# Patient Record
Sex: MALE | Race: WHITE | NOT HISPANIC OR LATINO | Employment: OTHER | URBAN - METROPOLITAN AREA
[De-identification: names, ages, dates, MRNs, and addresses within clinical notes are randomized per-mention and may not be internally consistent; named-entity substitution may affect disease eponyms.]

---

## 2022-06-17 ENCOUNTER — HOSPITAL ENCOUNTER (EMERGENCY)
Facility: HOSPITAL | Age: 87
Discharge: HOME | End: 2022-06-17
Attending: EMERGENCY MEDICINE | Admitting: EMERGENCY MEDICINE
Payer: MEDICARE

## 2022-06-17 ENCOUNTER — APPOINTMENT (OUTPATIENT)
Dept: RADIOLOGY | Facility: HOSPITAL | Age: 87
End: 2022-06-17
Attending: STUDENT IN AN ORGANIZED HEALTH CARE EDUCATION/TRAINING PROGRAM
Payer: MEDICARE

## 2022-06-17 VITALS
BODY MASS INDEX: 29.03 KG/M2 | HEART RATE: 64 BPM | SYSTOLIC BLOOD PRESSURE: 188 MMHG | OXYGEN SATURATION: 96 % | DIASTOLIC BLOOD PRESSURE: 81 MMHG | TEMPERATURE: 98.4 F | HEIGHT: 67 IN | RESPIRATION RATE: 18 BRPM | WEIGHT: 185 LBS

## 2022-06-17 DIAGNOSIS — R42 EPISODIC LIGHTHEADEDNESS: ICD-10-CM

## 2022-06-17 DIAGNOSIS — M62.81 GENERALIZED MUSCLE WEAKNESS: ICD-10-CM

## 2022-06-17 LAB
ALBUMIN UR-MCNC: NEGATIVE MG/DL
ANION GAP SERPL CALCULATED.3IONS-SCNC: 8 MMOL/L (ref 5–18)
APPEARANCE UR: CLEAR
BASOPHILS # BLD AUTO: 0.1 10E3/UL (ref 0–0.2)
BASOPHILS NFR BLD AUTO: 1 %
BILIRUB UR QL STRIP: NEGATIVE
BUN SERPL-MCNC: 14 MG/DL (ref 8–28)
CALCIUM SERPL-MCNC: 9.4 MG/DL (ref 8.5–10.5)
CHLORIDE BLD-SCNC: 107 MMOL/L (ref 98–107)
CO2 SERPL-SCNC: 26 MMOL/L (ref 22–31)
COLOR UR AUTO: COLORLESS
CREAT SERPL-MCNC: 0.88 MG/DL (ref 0.7–1.3)
EOSINOPHIL # BLD AUTO: 0.3 10E3/UL (ref 0–0.7)
EOSINOPHIL NFR BLD AUTO: 3 %
ERYTHROCYTE [DISTWIDTH] IN BLOOD BY AUTOMATED COUNT: 12.3 % (ref 10–15)
GFR SERPL CREATININE-BSD FRML MDRD: 84 ML/MIN/1.73M2
GLUCOSE BLD-MCNC: 96 MG/DL (ref 70–125)
GLUCOSE UR STRIP-MCNC: NEGATIVE MG/DL
HCT VFR BLD AUTO: 43.2 % (ref 40–53)
HGB BLD-MCNC: 14.1 G/DL (ref 13.3–17.7)
HGB UR QL STRIP: NEGATIVE
IMM GRANULOCYTES # BLD: 0.1 10E3/UL
IMM GRANULOCYTES NFR BLD: 1 %
KETONES UR STRIP-MCNC: NEGATIVE MG/DL
LACTATE SERPL-SCNC: 1.1 MMOL/L (ref 0.7–2)
LEUKOCYTE ESTERASE UR QL STRIP: NEGATIVE
LYMPHOCYTES # BLD AUTO: 1.5 10E3/UL (ref 0.8–5.3)
LYMPHOCYTES NFR BLD AUTO: 16 %
MCH RBC QN AUTO: 31.9 PG (ref 26.5–33)
MCHC RBC AUTO-ENTMCNC: 32.6 G/DL (ref 31.5–36.5)
MCV RBC AUTO: 98 FL (ref 78–100)
MONOCYTES # BLD AUTO: 0.7 10E3/UL (ref 0–1.3)
MONOCYTES NFR BLD AUTO: 8 %
NEUTROPHILS # BLD AUTO: 6.7 10E3/UL (ref 1.6–8.3)
NEUTROPHILS NFR BLD AUTO: 71 %
NITRATE UR QL: NEGATIVE
NRBC # BLD AUTO: 0 10E3/UL
NRBC BLD AUTO-RTO: 0 /100
PH UR STRIP: 5.5 [PH] (ref 5–7)
PLATELET # BLD AUTO: 335 10E3/UL (ref 150–450)
POTASSIUM BLD-SCNC: 4.3 MMOL/L (ref 3.5–5)
RBC # BLD AUTO: 4.42 10E6/UL (ref 4.4–5.9)
RBC URINE: 0 /HPF
SODIUM SERPL-SCNC: 141 MMOL/L (ref 136–145)
SP GR UR STRIP: 1.01 (ref 1–1.03)
TROPONIN I SERPL-MCNC: 0.01 NG/ML (ref 0–0.29)
UROBILINOGEN UR STRIP-MCNC: <2 MG/DL
WBC # BLD AUTO: 9.2 10E3/UL (ref 4–11)
WBC URINE: 1 /HPF

## 2022-06-17 PROCEDURE — 85025 COMPLETE CBC W/AUTO DIFF WBC: CPT | Performed by: STUDENT IN AN ORGANIZED HEALTH CARE EDUCATION/TRAINING PROGRAM

## 2022-06-17 PROCEDURE — 71046 X-RAY EXAM CHEST 2 VIEWS: CPT

## 2022-06-17 PROCEDURE — 96361 HYDRATE IV INFUSION ADD-ON: CPT

## 2022-06-17 PROCEDURE — 258N000003 HC RX IP 258 OP 636: Performed by: STUDENT IN AN ORGANIZED HEALTH CARE EDUCATION/TRAINING PROGRAM

## 2022-06-17 PROCEDURE — 82310 ASSAY OF CALCIUM: CPT | Performed by: STUDENT IN AN ORGANIZED HEALTH CARE EDUCATION/TRAINING PROGRAM

## 2022-06-17 PROCEDURE — 99285 EMERGENCY DEPT VISIT HI MDM: CPT | Mod: 25

## 2022-06-17 PROCEDURE — 93005 ELECTROCARDIOGRAM TRACING: CPT | Performed by: STUDENT IN AN ORGANIZED HEALTH CARE EDUCATION/TRAINING PROGRAM

## 2022-06-17 PROCEDURE — 84484 ASSAY OF TROPONIN QUANT: CPT | Performed by: STUDENT IN AN ORGANIZED HEALTH CARE EDUCATION/TRAINING PROGRAM

## 2022-06-17 PROCEDURE — 83605 ASSAY OF LACTIC ACID: CPT | Performed by: STUDENT IN AN ORGANIZED HEALTH CARE EDUCATION/TRAINING PROGRAM

## 2022-06-17 PROCEDURE — 96360 HYDRATION IV INFUSION INIT: CPT

## 2022-06-17 PROCEDURE — 82374 ASSAY BLOOD CARBON DIOXIDE: CPT | Performed by: STUDENT IN AN ORGANIZED HEALTH CARE EDUCATION/TRAINING PROGRAM

## 2022-06-17 PROCEDURE — 81001 URINALYSIS AUTO W/SCOPE: CPT | Performed by: STUDENT IN AN ORGANIZED HEALTH CARE EDUCATION/TRAINING PROGRAM

## 2022-06-17 PROCEDURE — 36415 COLL VENOUS BLD VENIPUNCTURE: CPT | Performed by: STUDENT IN AN ORGANIZED HEALTH CARE EDUCATION/TRAINING PROGRAM

## 2022-06-17 RX ADMIN — SODIUM CHLORIDE 1000 ML: 9 INJECTION, SOLUTION INTRAVENOUS at 13:35

## 2022-06-17 ASSESSMENT — ENCOUNTER SYMPTOMS
TREMORS: 0
SORE THROAT: 0
FEVER: 0
JOINT SWELLING: 0
CONFUSION: 0
DYSURIA: 0
CHILLS: 0
VOMITING: 0
DIARRHEA: 0
HEADACHES: 0
WEAKNESS: 1
SHORTNESS OF BREATH: 0
NAUSEA: 0
DIZZINESS: 0
HEMATURIA: 0
ABDOMINAL PAIN: 0

## 2022-06-17 NOTE — DISCHARGE INSTRUCTIONS
Labs and evaluation today, reassuring, improved after IvF.  Please follow up with primary clinic. Return for new emergency concerns.

## 2022-06-17 NOTE — ED PROVIDER NOTES
"  Emergency Department Encounter     Evaluation Date & Time:   6/17/2022  3:13 PM    CHIEF COMPLAINT:  No chief complaint on file.      Triage Note:  Patient brought to ED by his niece for evaluation of bilateral leg weakness and dizziness. Started aprx 2 hours ago.                ED COURSE & MEDICAL DECISION MAKING:     ED Course as of 06/17/22 1747   Fri Jun 17, 2022   1528 Labs reassuring.  Awaiting UA.  CXR neg for acute pathology.   1554 Pt able to ambulate, appears to be at baseline per niece.  Will eat something here, sending UA now.  Anticipate discharge back to home.   1631 UA neg for infection.  Will discharge home.     Pt presenting with generalized weakness and lightheadedness that started shortly pta after getting \"aggressive\" PT at his senior living apartment.  Pt's niece concerned he didn't eat much today and likely is a little dehydrated.  He has no focal complaints or findings on exam to suggest stroke/intracranial process.  Vitals overall reassuring.  Labs from triage unremarkable.  Awaiting UA and will road test pt.  If stable, anticipate discharge back home.  Pt received IVF here.    3:29 PM I met with the patient for the initial interview and physical examination. Discussed plan for treatment and workup in the ED. PPE Worn: N95 and gloves   4:32 PM I rechecked and updated the patient. We discussed the plan for discharge and the patient is agreeable. Reviewed supportive cares, symptomatic treatment, outpatient follow up, and reasons to return to the Emergency Department. All questions and concerns were addressed. Patient to be discharged by ED RN.      At the conclusion of the encounter I discussed the results of all the tests and the disposition. The questions were answered. The patient or family acknowledged understanding and was agreeable with the care plan.      MEDICATIONS GIVEN IN THE EMERGENCY DEPARTMENT:  Medications   0.9% sodium chloride BOLUS (0 mLs Intravenous Stopped 6/17/22 4505) "       NEW PRESCRIPTIONS STARTED AT TODAY'S ED VISIT:  Discharge Medication List as of 6/17/2022  4:38 PM          HPI   HPI     George R Milligan is a 86 year old male with a pertinent history of coronary artery bypass graft, pre-diabetes, and basal cell carcinoma who presents to this ED by private vehicle accompanied by niece for evaluation of bilateral leg weakness.    Per patient's niece, patient was in physical therapy early this morning where he underwent a intense back massage. She notes that patient currently lives independently in an apartment complex that has assisted living which the patient does not currently require. Patient had not eaten or drank anything yet at that point and when walking to the cafeteria, patient was visibly dizzy and almost fell. Patient had been walking normally and progressively declined in bilateral lower extremity strength to the point where he was unable to stand.    Patient reports that he has no strength from the hips down following the back massage during physical therapy this morning. He notes that is currently feels okay but is unsure if he has the ability to stand up. Patient denies abdominal pain, chest pain, nausea, vomiting, diarrhea, cough, fever, headache, tremors, or additional symptoms or complaints at this time.     REVIEW OF SYSTEMS:  Review of Systems   Constitutional: Negative for chills and fever.   HENT: Negative for sore throat.    Eyes: Negative for visual disturbance.   Respiratory: Negative for shortness of breath.    Cardiovascular: Negative for chest pain.   Gastrointestinal: Negative for abdominal pain, diarrhea, nausea and vomiting.   Endocrine: Negative for polyuria.   Genitourinary: Negative for dysuria and hematuria.        - urinary changes   Musculoskeletal: Negative for joint swelling.   Skin: Negative for rash.   Neurological: Positive for weakness (bilateral lower extremities). Negative for dizziness, tremors and headaches.  "  Psychiatric/Behavioral: Negative for confusion.   All other systems reviewed and are negative.        Medical History   History reviewed. No pertinent past medical history.    History reviewed. No pertinent surgical history.    History reviewed. No pertinent family history.         aspirin 81 mg chewable tablet  cholecalciferol, vitamin D3, 1,000 unit tablet  clopidogrel (PLAVIX) 75 mg tablet  lisinopril (PRINIVIL,ZESTRIL) 20 MG tablet  metoprolol succinate (TOPROL-XL) 50 MG 24 hr tablet  rosuvastatin (CRESTOR) 5 MG tablet        Physical Exam     Vitals:  BP (!) 188/81   Pulse 64   Temp 98.4  F (36.9  C) (Temporal)   Resp 18   Ht 1.702 m (5' 7\")   Wt 83.9 kg (185 lb)   SpO2 96%   BMI 28.98 kg/m      PHYSICAL EXAM:   Physical Exam  Vitals and nursing note reviewed.   Constitutional:       General: He is not in acute distress.     Appearance: Normal appearance.   HENT:      Head: Normocephalic and atraumatic.      Mouth/Throat:      Mouth: Mucous membranes are moist.   Eyes:      Extraocular Movements: Extraocular movements intact.      Pupils: Pupils are equal, round, and reactive to light.      Comments: No vertical or bidirectional nystagmus   Cardiovascular:      Rate and Rhythm: Normal rate and regular rhythm.   Pulmonary:      Effort: Pulmonary effort is normal. No respiratory distress.      Breath sounds: Normal breath sounds.   Abdominal:      General: There is no distension.      Palpations: Abdomen is soft.      Tenderness: There is no abdominal tenderness.   Musculoskeletal:         General: Normal range of motion.      Cervical back: Normal range of motion.   Skin:     General: Skin is warm and dry.      Capillary Refill: Capillary refill takes less than 2 seconds.   Neurological:      Mental Status: He is alert.      Cranial Nerves: Cranial nerves are intact.      Sensory: Sensation is intact.      Motor: Tremor (Bilateral intention upper extremities) present.      Comments: Fluent speech, no " facial asymmetry or pronator drift, 5/5 strength b/l UE/LE, normal finger to nose b/l.         Results     LAB:  All pertinent labs reviewed and interpreted  Labs Ordered and Resulted from Time of ED Arrival to Time of ED Departure   ROUTINE UA WITH MICROSCOPIC REFLEX TO CULTURE - Normal       Result Value    Color Urine Colorless      Appearance Urine Clear      Glucose Urine Negative      Bilirubin Urine Negative      Ketones Urine Negative      Specific Gravity Urine 1.007      Blood Urine Negative      pH Urine 5.5      Protein Albumin Urine Negative      Urobilinogen Urine <2.0      Nitrite Urine Negative      Leukocyte Esterase Urine Negative      RBC Urine 0      WBC Urine 1     BASIC METABOLIC PANEL - Normal    Sodium 141      Potassium 4.3      Chloride 107      Carbon Dioxide (CO2) 26      Anion Gap 8      Urea Nitrogen 14      Creatinine 0.88      Calcium 9.4      Glucose 96      GFR Estimate 84     TROPONIN I - Normal    Troponin I 0.01     LACTIC ACID WHOLE BLOOD - Normal    Lactic Acid 1.1     CBC WITH PLATELETS AND DIFFERENTIAL    WBC Count 9.2      RBC Count 4.42      Hemoglobin 14.1      Hematocrit 43.2      MCV 98      MCH 31.9      MCHC 32.6      RDW 12.3      Platelet Count 335      % Neutrophils 71      % Lymphocytes 16      % Monocytes 8      % Eosinophils 3      % Basophils 1      % Immature Granulocytes 1      NRBCs per 100 WBC 0      Absolute Neutrophils 6.7      Absolute Lymphocytes 1.5      Absolute Monocytes 0.7      Absolute Eosinophils 0.3      Absolute Basophils 0.1      Absolute Immature Granulocytes 0.1      Absolute NRBCs 0.0         RADIOLOGY:  Chest XR,  PA & LAT   Final Result   IMPRESSION: There is increased interstitial markings at the bases suggesting mild interstitial lung disease. No focal consolidation, pneumothorax or pleural effusion. Normal heart size and pulmonary vascularity. Postoperative change from median    sternotomy and coronary bypass grafting.                    ECG:  none    PROCEDURES:  Procedures:  none      FINAL IMPRESSION:    ICD-10-CM    1. Generalized muscle weakness  M62.81    2. Episodic lightheadedness  R42        0 minutes of critical care time      I, Glenn Connors, am serving as a scribe to document services personally performed by Dr. Arash Lee, based on my observations and the provider's statements to me. I, Arash Lee, DO attest that Glenn Connors is acting in a scribe capacity, has observed my performance of the services and has documented them in accordance with my direction.      Arash Lee DO  Emergency Medicine  [facility] EMERGENCY DEPARTMENT  6/17/2022  3:56 PM        Arash Lee MD  06/17/22 4901

## 2022-06-17 NOTE — ED NOTES
He ambulated with a walker and seemed to do well. Niece who witnessed confirmed he was doing well with the walker. He uses a walker at times at home in independent living.

## 2022-06-17 NOTE — ED NOTES
"ED Provider In Triage Note  [facility]  Encounter Date: Jun 17, 2022    No chief complaint on file.      Brief HPI:   George R Milligan is a 86 year old male presenting to the Emergency Department with a chief complaint of generalized weakness.    Noticed generalized weakness about 2 hours PTA  No falls, trauma or other injuries  Globally weak on exam, but non-focal, face symetrical  Niece present with him, concerned about possible dehydration  No fevers or cough reported  Denies chest pain, headache or SOB    Lives at an independent living facility, did his normal PT this morning.      Brief Physical Exam:  BP (!) 156/79   Pulse 72   Temp 98.4  F (36.9  C) (Temporal)   Resp 18   Ht 1.702 m (5' 7\")   Wt 83.9 kg (185 lb)   SpO2 98%   BMI 28.98 kg/m    General: Non-toxic appearing  HEENT: Atraumatic  Resp: No respiratory distress  Abdomen: Non-peritoneal  Neuro: Alert, oriented, answers questions appropriately. Grossly normal strength in all 4 extremities. Globally weak, but weakness is non-focal. No facial droop, EOMI.   Psych: Behavior appropriate    Plan Initiated in Triage:  Orders Placed This Encounter     Chest XR,  PA & LAT     UA with Microscopic reflex to Culture     Basic metabolic panel     Troponin I (now)     Lactic acid whole blood     0.9% sodium chloride BOLUS       PIT Dispo:   Return to lobby while awaiting workup and ED bed availability    Marvin Serrato MD on 6/17/2022 at 12:34 PM    Patient was evaluated by the Physician in Triage due to a limitation of available rooms in the Emergency Department. A plan of care was discussed based on the information obtained on the initial evaluation and patient was consuled to return back to the Emergency Department lobby after this initial evalutaiton until results were obtained or a room became available in the Emergency Department. Patient was counseled not to leave prior to receiving the results of their workup.     Marvin" MD Ama  [facility] EMERGENCY DEPARTMENT  [facility] [facility] [id number]  [phone]     Marvin Serrato MD  06/17/22 2836

## 2022-06-17 NOTE — ED TRIAGE NOTES
Patient brought to ED by his niece for evaluation of bilateral leg weakness and dizziness. Started aprx 2 hours ago.      Triage Assessment     Row Name 06/17/22 1233       Triage Assessment (Adult)    Airway WDL WDL       Skin Circulation/Temperature WDL    Skin Circulation/Temperature WDL WDL       Cardiac WDL    Cardiac WDL WDL       Peripheral/Neurovascular WDL    Peripheral Neurovascular WDL WDL       Cognitive/Neuro/Behavioral WDL    Cognitive/Neuro/Behavioral WDL WDL

## 2022-06-19 LAB
ATRIAL RATE - MUSE: 71 BPM
DIASTOLIC BLOOD PRESSURE - MUSE: NORMAL MMHG
INTERPRETATION ECG - MUSE: NORMAL
P AXIS - MUSE: 56 DEGREES
PR INTERVAL - MUSE: 216 MS
QRS DURATION - MUSE: 86 MS
QT - MUSE: 412 MS
QTC - MUSE: 447 MS
R AXIS - MUSE: 0 DEGREES
SYSTOLIC BLOOD PRESSURE - MUSE: NORMAL MMHG
T AXIS - MUSE: 37 DEGREES
VENTRICULAR RATE- MUSE: 71 BPM

## 2022-08-12 ENCOUNTER — APPOINTMENT (OUTPATIENT)
Dept: RADIOLOGY | Facility: HOSPITAL | Age: 87
DRG: 177 | End: 2022-08-12
Attending: EMERGENCY MEDICINE
Payer: MEDICARE

## 2022-08-12 ENCOUNTER — APPOINTMENT (OUTPATIENT)
Dept: CT IMAGING | Facility: HOSPITAL | Age: 87
DRG: 177 | End: 2022-08-12
Attending: EMERGENCY MEDICINE
Payer: MEDICARE

## 2022-08-12 ENCOUNTER — HOSPITAL ENCOUNTER (INPATIENT)
Facility: HOSPITAL | Age: 87
LOS: 5 days | Discharge: NURSING, REHAB OR LONG-TERM CARE | DRG: 177 | End: 2022-08-23
Attending: EMERGENCY MEDICINE | Admitting: INTERNAL MEDICINE
Payer: MEDICARE

## 2022-08-12 ENCOUNTER — APPOINTMENT (OUTPATIENT)
Dept: CARDIOLOGY | Facility: HOSPITAL | Age: 87
DRG: 177 | End: 2022-08-12
Attending: INTERNAL MEDICINE
Payer: MEDICARE

## 2022-08-12 DIAGNOSIS — I10 ESSENTIAL HYPERTENSION: ICD-10-CM
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
## 2022-08-12 DIAGNOSIS — S31.20XD: ICD-10-CM

## 2022-08-12 DIAGNOSIS — E78.00 PURE HYPERCHOLESTEROLEMIA: Primary | ICD-10-CM

## 2022-08-12 DIAGNOSIS — M62.81 GENERALIZED MUSCLE WEAKNESS: ICD-10-CM

## 2022-08-12 DIAGNOSIS — U07.1 INFECTION DUE TO 2019 NOVEL CORONAVIRUS: ICD-10-CM

## 2022-08-12 LAB
ABO/RH(D): NORMAL
ALBUMIN SERPL-MCNC: 3.1 G/DL (ref 3.5–5)
ALBUMIN UR-MCNC: NEGATIVE MG/DL
ALP SERPL-CCNC: 97 U/L (ref 45–120)
ALT SERPL W P-5'-P-CCNC: 15 U/L (ref 0–45)
ANION GAP SERPL CALCULATED.3IONS-SCNC: 4 MMOL/L (ref 5–18)
ANTIBODY SCREEN: NEGATIVE
APPEARANCE UR: CLEAR
AST SERPL W P-5'-P-CCNC: 23 U/L (ref 0–40)
ATRIAL RATE - MUSE: 75 BPM
BASOPHILS # BLD AUTO: 0.1 10E3/UL (ref 0–0.2)
BASOPHILS NFR BLD AUTO: 1 %
BILIRUB DIRECT SERPL-MCNC: 0.1 MG/DL
BILIRUB SERPL-MCNC: 0.4 MG/DL (ref 0–1)
BILIRUB UR QL STRIP: NEGATIVE
BNP SERPL-MCNC: 228 PG/ML (ref 0–93)
BUN SERPL-MCNC: 14 MG/DL (ref 8–28)
CALCIUM SERPL-MCNC: 8.7 MG/DL (ref 8.5–10.5)
CHLORIDE BLD-SCNC: 107 MMOL/L (ref 98–107)
CK SERPL-CCNC: 367 U/L (ref 30–190)
CO2 SERPL-SCNC: 29 MMOL/L (ref 22–31)
COLOR UR AUTO: ABNORMAL
CREAT SERPL-MCNC: 0.81 MG/DL (ref 0.7–1.3)
DIASTOLIC BLOOD PRESSURE - MUSE: NORMAL MMHG
EOSINOPHIL # BLD AUTO: 0 10E3/UL (ref 0–0.7)
EOSINOPHIL NFR BLD AUTO: 0 %
ERYTHROCYTE [DISTWIDTH] IN BLOOD BY AUTOMATED COUNT: 12.6 % (ref 10–15)
GFR SERPL CREATININE-BSD FRML MDRD: 86 ML/MIN/1.73M2
GLUCOSE BLD-MCNC: 96 MG/DL (ref 70–125)
GLUCOSE UR STRIP-MCNC: NEGATIVE MG/DL
HCT VFR BLD AUTO: 42.7 % (ref 40–53)
HGB BLD-MCNC: 14 G/DL (ref 13.3–17.7)
HGB UR QL STRIP: ABNORMAL
HOLD SPECIMEN: NORMAL
IMM GRANULOCYTES # BLD: 0 10E3/UL
IMM GRANULOCYTES NFR BLD: 1 %
INR PPP: 1.04 (ref 0.85–1.15)
INTERPRETATION ECG - MUSE: NORMAL
KETONES UR STRIP-MCNC: 10 MG/DL
LEUKOCYTE ESTERASE UR QL STRIP: NEGATIVE
LIPASE SERPL-CCNC: 34 U/L (ref 0–52)
LVEF ECHO: NORMAL
LYMPHOCYTES # BLD AUTO: 0.5 10E3/UL (ref 0.8–5.3)
LYMPHOCYTES NFR BLD AUTO: 6 %
MAGNESIUM SERPL-MCNC: 2.2 MG/DL (ref 1.8–2.6)
MCH RBC QN AUTO: 31.8 PG (ref 26.5–33)
MCHC RBC AUTO-ENTMCNC: 32.8 G/DL (ref 31.5–36.5)
MCV RBC AUTO: 97 FL (ref 78–100)
MONOCYTES # BLD AUTO: 1.2 10E3/UL (ref 0–1.3)
MONOCYTES NFR BLD AUTO: 16 %
MUCOUS THREADS #/AREA URNS LPF: PRESENT /LPF
NEUTROPHILS # BLD AUTO: 5.9 10E3/UL (ref 1.6–8.3)
NEUTROPHILS NFR BLD AUTO: 76 %
NITRATE UR QL: NEGATIVE
NRBC # BLD AUTO: 0 10E3/UL
NRBC BLD AUTO-RTO: 0 /100
P AXIS - MUSE: 37 DEGREES
PH UR STRIP: 5 [PH] (ref 5–7)
PLATELET # BLD AUTO: 303 10E3/UL (ref 150–450)
POTASSIUM BLD-SCNC: 4 MMOL/L (ref 3.5–5)
PR INTERVAL - MUSE: 208 MS
PROT SERPL-MCNC: 6.2 G/DL (ref 6–8)
QRS DURATION - MUSE: 78 MS
QT - MUSE: 388 MS
QTC - MUSE: 433 MS
R AXIS - MUSE: 6 DEGREES
RBC # BLD AUTO: 4.4 10E6/UL (ref 4.4–5.9)
RBC URINE: 0 /HPF
SARS-COV-2 RNA RESP QL NAA+PROBE: POSITIVE
SODIUM SERPL-SCNC: 140 MMOL/L (ref 136–145)
SP GR UR STRIP: 1.02 (ref 1–1.03)
SPECIMEN EXPIRATION DATE: NORMAL
SYSTOLIC BLOOD PRESSURE - MUSE: NORMAL MMHG
T AXIS - MUSE: -5 DEGREES
TROPONIN I SERPL-MCNC: 0.03 NG/ML (ref 0–0.29)
UROBILINOGEN UR STRIP-MCNC: <2 MG/DL
VENTRICULAR RATE- MUSE: 75 BPM
WBC # BLD AUTO: 7.6 10E3/UL (ref 4–11)
WBC URINE: <1 /HPF

## 2022-08-12 PROCEDURE — G1010 CDSM STANSON: HCPCS

## 2022-08-12 PROCEDURE — C9803 HOPD COVID-19 SPEC COLLECT: HCPCS

## 2022-08-12 PROCEDURE — 73560 X-RAY EXAM OF KNEE 1 OR 2: CPT | Mod: LT

## 2022-08-12 PROCEDURE — 250N000013 HC RX MED GY IP 250 OP 250 PS 637: Performed by: INTERNAL MEDICINE

## 2022-08-12 PROCEDURE — 83690 ASSAY OF LIPASE: CPT | Performed by: EMERGENCY MEDICINE

## 2022-08-12 PROCEDURE — 99233 SBSQ HOSP IP/OBS HIGH 50: CPT | Mod: CS | Performed by: INTERNAL MEDICINE

## 2022-08-12 PROCEDURE — 82310 ASSAY OF CALCIUM: CPT | Performed by: EMERGENCY MEDICINE

## 2022-08-12 PROCEDURE — 86850 RBC ANTIBODY SCREEN: CPT | Performed by: EMERGENCY MEDICINE

## 2022-08-12 PROCEDURE — 36415 COLL VENOUS BLD VENIPUNCTURE: CPT | Performed by: EMERGENCY MEDICINE

## 2022-08-12 PROCEDURE — 93005 ELECTROCARDIOGRAM TRACING: CPT | Performed by: EMERGENCY MEDICINE

## 2022-08-12 PROCEDURE — G0378 HOSPITAL OBSERVATION PER HR: HCPCS

## 2022-08-12 PROCEDURE — 255N000002 HC RX 255 OP 636: Performed by: INTERNAL MEDICINE

## 2022-08-12 PROCEDURE — 83735 ASSAY OF MAGNESIUM: CPT | Performed by: EMERGENCY MEDICINE

## 2022-08-12 PROCEDURE — 82248 BILIRUBIN DIRECT: CPT | Performed by: EMERGENCY MEDICINE

## 2022-08-12 PROCEDURE — 96372 THER/PROPH/DIAG INJ SC/IM: CPT | Performed by: INTERNAL MEDICINE

## 2022-08-12 PROCEDURE — 250N000011 HC RX IP 250 OP 636: Performed by: INTERNAL MEDICINE

## 2022-08-12 PROCEDURE — 82550 ASSAY OF CK (CPK): CPT | Performed by: EMERGENCY MEDICINE

## 2022-08-12 PROCEDURE — 93306 TTE W/DOPPLER COMPLETE: CPT | Mod: 26 | Performed by: INTERNAL MEDICINE

## 2022-08-12 PROCEDURE — 86901 BLOOD TYPING SEROLOGIC RH(D): CPT | Performed by: EMERGENCY MEDICINE

## 2022-08-12 PROCEDURE — 81001 URINALYSIS AUTO W/SCOPE: CPT | Performed by: EMERGENCY MEDICINE

## 2022-08-12 PROCEDURE — 73590 X-RAY EXAM OF LOWER LEG: CPT | Mod: LT

## 2022-08-12 PROCEDURE — 99285 EMERGENCY DEPT VISIT HI MDM: CPT

## 2022-08-12 PROCEDURE — 85610 PROTHROMBIN TIME: CPT | Performed by: EMERGENCY MEDICINE

## 2022-08-12 PROCEDURE — 83880 ASSAY OF NATRIURETIC PEPTIDE: CPT | Performed by: EMERGENCY MEDICINE

## 2022-08-12 PROCEDURE — U0005 INFEC AGEN DETEC AMPLI PROBE: HCPCS | Performed by: EMERGENCY MEDICINE

## 2022-08-12 PROCEDURE — 85014 HEMATOCRIT: CPT | Performed by: EMERGENCY MEDICINE

## 2022-08-12 PROCEDURE — 84484 ASSAY OF TROPONIN QUANT: CPT | Performed by: EMERGENCY MEDICINE

## 2022-08-12 RX ORDER — AMOXICILLIN 250 MG
1 CAPSULE ORAL 2 TIMES DAILY PRN
Status: DISCONTINUED | OUTPATIENT
Start: 2022-08-12 | End: 2022-08-23 | Stop reason: HOSPADM

## 2022-08-12 RX ORDER — METOPROLOL SUCCINATE 25 MG/1
50 TABLET, EXTENDED RELEASE ORAL EVERY EVENING
Status: DISCONTINUED | OUTPATIENT
Start: 2022-08-12 | End: 2022-08-13

## 2022-08-12 RX ORDER — LISINOPRIL 20 MG/1
20 TABLET ORAL EVERY EVENING
Status: DISCONTINUED | OUTPATIENT
Start: 2022-08-12 | End: 2022-08-23

## 2022-08-12 RX ORDER — ONDANSETRON 2 MG/ML
4 INJECTION INTRAMUSCULAR; INTRAVENOUS EVERY 6 HOURS PRN
Status: DISCONTINUED | OUTPATIENT
Start: 2022-08-12 | End: 2022-08-23 | Stop reason: HOSPADM

## 2022-08-12 RX ORDER — AMOXICILLIN 250 MG
2 CAPSULE ORAL 2 TIMES DAILY PRN
Status: DISCONTINUED | OUTPATIENT
Start: 2022-08-12 | End: 2022-08-23 | Stop reason: HOSPADM

## 2022-08-12 RX ORDER — ENOXAPARIN SODIUM 100 MG/ML
40 INJECTION SUBCUTANEOUS EVERY 24 HOURS
Status: DISCONTINUED | OUTPATIENT
Start: 2022-08-12 | End: 2022-08-23 | Stop reason: HOSPADM

## 2022-08-12 RX ORDER — LIDOCAINE 40 MG/G
CREAM TOPICAL
Status: DISCONTINUED | OUTPATIENT
Start: 2022-08-12 | End: 2022-08-23 | Stop reason: HOSPADM

## 2022-08-12 RX ORDER — ACETAMINOPHEN 650 MG/1
650 SUPPOSITORY RECTAL EVERY 6 HOURS PRN
Status: DISCONTINUED | OUTPATIENT
Start: 2022-08-12 | End: 2022-08-23 | Stop reason: HOSPADM

## 2022-08-12 RX ORDER — VITAMIN B COMPLEX
1000 TABLET ORAL EVERY EVENING
Status: DISCONTINUED | OUTPATIENT
Start: 2022-08-12 | End: 2022-08-23 | Stop reason: HOSPADM

## 2022-08-12 RX ORDER — ONDANSETRON 4 MG/1
4 TABLET, ORALLY DISINTEGRATING ORAL EVERY 6 HOURS PRN
Status: DISCONTINUED | OUTPATIENT
Start: 2022-08-12 | End: 2022-08-23 | Stop reason: HOSPADM

## 2022-08-12 RX ORDER — ACETAMINOPHEN 325 MG/1
650 TABLET ORAL EVERY 6 HOURS PRN
Status: DISCONTINUED | OUTPATIENT
Start: 2022-08-12 | End: 2022-08-23 | Stop reason: HOSPADM

## 2022-08-12 RX ORDER — IOPAMIDOL 755 MG/ML
75 INJECTION, SOLUTION INTRAVASCULAR ONCE
Status: COMPLETED | OUTPATIENT
Start: 2022-08-12 | End: 2022-08-12

## 2022-08-12 RX ORDER — ASPIRIN 81 MG/1
81 TABLET, CHEWABLE ORAL EVERY EVENING
Status: DISCONTINUED | OUTPATIENT
Start: 2022-08-12 | End: 2022-08-23 | Stop reason: HOSPADM

## 2022-08-12 RX ADMIN — LISINOPRIL 20 MG: 20 TABLET ORAL at 20:42

## 2022-08-12 RX ADMIN — IOPAMIDOL 75 ML: 755 INJECTION, SOLUTION INTRAVENOUS at 15:51

## 2022-08-12 RX ADMIN — ENOXAPARIN SODIUM 40 MG: 40 INJECTION SUBCUTANEOUS at 16:06

## 2022-08-12 RX ADMIN — METOPROLOL SUCCINATE 50 MG: 25 TABLET, EXTENDED RELEASE ORAL at 20:42

## 2022-08-12 RX ADMIN — PERFLUTREN 3 ML: 6.52 INJECTION, SUSPENSION INTRAVENOUS at 14:50

## 2022-08-12 RX ADMIN — ASPIRIN 81 MG CHEWABLE TABLET 81 MG: 81 TABLET CHEWABLE at 20:42

## 2022-08-12 RX ADMIN — Medication 1000 UNITS: at 20:41

## 2022-08-12 ASSESSMENT — ACTIVITIES OF DAILY LIVING (ADL)
ADLS_ACUITY_SCORE: 33
ADLS_ACUITY_SCORE: 35
ADLS_ACUITY_SCORE: 35
ADLS_ACUITY_SCORE: 37
DEPENDENT_IADLS:: CLEANING;COOKING;LAUNDRY;SHOPPING;MEAL PREPARATION;MEDICATION MANAGEMENT;MONEY MANAGEMENT;TRANSPORTATION
ADLS_ACUITY_SCORE: 35

## 2022-08-12 ASSESSMENT — ENCOUNTER SYMPTOMS
COUGH: 0
VOMITING: 0
NECK STIFFNESS: 1
SHORTNESS OF BREATH: 0
BACK PAIN: 0
HEADACHES: 0
ARTHRALGIAS: 0
DIARRHEA: 0
FEVER: 0
BLOOD IN STOOL: 0
CHILLS: 0

## 2022-08-12 NOTE — ED TRIAGE NOTES
Jian dumont via EMS from assisted living facility. Patient was on the floor calling for help when found by another resident. Patient does not know how he got to the floor. Patient has abrasion on right temporal region, complains of neck pain and left lower leg pain. Patient is on Plavix.    EMS vitals as follows:  BP: 129/81  18 G in RAC  A/O x 2  ECst degree block.

## 2022-08-12 NOTE — H&P
"ADMISSION HISTORY & PHYSICAL      Blu Chen, [phone]  ASSESSMENT AND PLAN:  86 year old male with a history of HTN, CABG x4 in 2016, HTN, CAD who presents from senior living facility after a fall.  Main complaint of generalized weakness and confusion.      COVID-19 infection   Not hypoxic.  No plans for remdesivir or steroids   Lovenox    Generalized weakness and confusion likely secondary to above   No signs of dehydration or sepsis- WBC normal, UA clean    PT/OT-slums   Patient's family requesting higher level of care-care management for placement   CTA chest abdomen pelvis negative for acute pathology    Acute metabolic encephalopathy   Likely related to COVID infection   Question underlying dementia?   CT head and cervical spine reviewed with no acute pathology    Fall   Unwitnessed, patient does not remember   CT head and neck reviewed   Cardiac telemetry ordered   ECHO.  Stress reviewed from 2016 with EF of 55%.  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   Check orthostatics   Knee x-rays and tibia-fibula x-rays with no fractures or effusions.    Penile wound    WOC to see.    Urology consult if needed     Known CAD status post CABG x4 in 2016   Continue aspirin, metoprolol    HTN-continue lisinopril    Code Status: DNR/DNI  DVT prophylaxis: Lovenox    Disposition:  -Anticipated Length of Stay in midnights and medical necessity (including a midnight in the Emergency Department after triage if applicable): 2 midnights  -Discharge barriers: Placement, improvement in mentation    Clinically Significant Risk Factors Present on Admission             # Hypoalbuminemia: Albumin = 3.1 g/dL (Ref range: 3.5 - 5.0 g/dL) on admission, will monitor as appropriate     # Hypertension: home medication list includes antihypertensive(s)    # Overweight: Estimated body mass index is 26.54 kg/m  as calculated from the following:    Height as of this encounter: 1.778 m (5' 10\").    Weight as of this encounter: 83.9 " kg (185 lb).            CHIEF COMPLAINT:  Fall and confusion    HISTORY OF PRESENTING ILLNESS:  Patient is a 86 year old male with history significant for HTN, CABG x4 in 2016, HTN, CAD who presents from senior living facility after a fall.  Main complaint of generalized weakness and confusion.  Patient presents here from a senior living facility after an unwitnessed fall.  Apparently patient had family visiting last night for dinner and sometime overnight fell on the floor.  He was found the next morning.  It is unclear when he fell and how long he was down.  Patient does not remember falling.  Complains of some chronic lower extremity pain but no new pains.  Denies any current chest pain, shortness of breath.  Denies any fevers or chills.  Denies diarrhea or constipation or dysuria.  Niece at bedside.  She notes patient has been complaining of generalized weakness and has been a little bit more confused the last few days.  Family is requesting higher level of care once patient is discharged.    PMH/PSH:  Patient Active Problem List   Diagnosis     Generalized muscle weakness     Fall, initial encounter       ALLERGIES:  Allergies   Allergen Reactions     Atorvastatin Muscle Pain (Myalgia)     Lopid [Gemfibrozil] Muscle Pain (Myalgia)     Pravachol [Pravastatin] Unknown       MEDICATIONS:  Reviewed.  Current Facility-Administered Medications   Medication     acetaminophen (TYLENOL) tablet 650 mg    Or     acetaminophen (TYLENOL) Suppository 650 mg     aspirin (ASA) chewable tablet 81 mg     enoxaparin ANTICOAGULANT (LOVENOX) injection 40 mg     lidocaine (LMX4) cream     lidocaine 1 % 0.1-1 mL     lisinopril (ZESTRIL) tablet 20 mg     melatonin tablet 1 mg     metoprolol succinate ER (TOPROL XL) 24 hr tablet 50 mg     ondansetron (ZOFRAN ODT) ODT tab 4 mg    Or     ondansetron (ZOFRAN) injection 4 mg     senna-docusate (SENOKOT-S/PERICOLACE) 8.6-50 MG per tablet 1 tablet    Or     senna-docusate  "(SENOKOT-S/PERICOLACE) 8.6-50 MG per tablet 2 tablet     sodium chloride (PF) 0.9% PF flush 3 mL     sodium chloride (PF) 0.9% PF flush 3 mL     Vitamin D3 (CHOLECALCIFEROL) tablet 1,000 Units     Current Outpatient Medications   Medication     aspirin 81 mg chewable tablet     cholecalciferol, vitamin D3, 1,000 unit tablet     lisinopril (PRINIVIL,ZESTRIL) 20 MG tablet     metoprolol succinate (TOPROL-XL) 50 MG 24 hr tablet       SOCIAL HISTORY:  Social History     Socioeconomic History     Marital status: Single     Spouse name: Not on file     Number of children: Not on file     Years of education: Not on file     Highest education level: Not on file   Occupational History     Not on file   Tobacco Use     Smoking status: Not on file     Smokeless tobacco: Not on file   Substance and Sexual Activity     Alcohol use: Not on file     Drug use: Not on file     Sexual activity: Not on file   Other Topics Concern     Not on file   Social History Narrative     Not on file     Social Determinants of Health     Financial Resource Strain: Not on file   Food Insecurity: Not on file   Transportation Needs: Not on file   Physical Activity: Not on file   Stress: Not on file   Social Connections: Not on file   Intimate Partner Violence: Not on file   Housing Stability: Not on file       FAMILY HISTORY:  Reviewed and non contributory     ROS:  12 point review of systems is reviewed and is negative except for what has already been mention in the history of presenting illness.     PHYSICAL EXAM:  /52   Pulse 73   Temp 99  F (37.2  C) (Oral)   Resp 23   Ht 1.778 m (5' 10\")   Wt 83.9 kg (185 lb)   SpO2 94%   BMI 26.54 kg/m    No intake/output data recorded.  No intake/output data recorded.  GENRL: Alert and answering questions.  Confused.  Not in acute respiratory distress.  Resting comfortably in bed.    HEENT: no JVP elevation, no lymphadenopathy or thyromegaly  CHEST: Clear to auscultation bilaterally. No wheezes, " rhonchi or crackles. Breathing easily   HEART: Regular rate and rhythm, S1S2 auscultated. No murmurs  ABDMN: Soft. Non-tender, non-distended. No organomegaly. No guarding or rigidity. Bowel sounds present   EXTRM: No pedal edema, DP pulses 2+.   NEURO: Following commands and moving extremities.    PSYCH: flat affect and mood.   INTGM: No skin rash, no cyanosis or clubbing    DIAGNOSTIC DATA:  Recent Results (from the past 24 hour(s))   Lipase    Collection Time: 08/12/22  9:38 AM   Result Value Ref Range    Lipase 34 0 - 52 U/L   Troponin I (now)    Collection Time: 08/12/22  9:38 AM   Result Value Ref Range    Troponin I 0.03 0.00 - 0.29 ng/mL   Magnesium    Collection Time: 08/12/22  9:38 AM   Result Value Ref Range    Magnesium 2.2 1.8 - 2.6 mg/dL   INR    Collection Time: 08/12/22  9:38 AM   Result Value Ref Range    INR 1.04 0.85 - 1.15   CBC with platelets and differential    Collection Time: 08/12/22  9:38 AM   Result Value Ref Range    WBC Count 7.6 4.0 - 11.0 10e3/uL    RBC Count 4.40 4.40 - 5.90 10e6/uL    Hemoglobin 14.0 13.3 - 17.7 g/dL    Hematocrit 42.7 40.0 - 53.0 %    MCV 97 78 - 100 fL    MCH 31.8 26.5 - 33.0 pg    MCHC 32.8 31.5 - 36.5 g/dL    RDW 12.6 10.0 - 15.0 %    Platelet Count 303 150 - 450 10e3/uL    % Neutrophils 76 %    % Lymphocytes 6 %    % Monocytes 16 %    % Eosinophils 0 %    % Basophils 1 %    % Immature Granulocytes 1 %    NRBCs per 100 WBC 0 <1 /100    Absolute Neutrophils 5.9 1.6 - 8.3 10e3/uL    Absolute Lymphocytes 0.5 (L) 0.8 - 5.3 10e3/uL    Absolute Monocytes 1.2 0.0 - 1.3 10e3/uL    Absolute Eosinophils 0.0 0.0 - 0.7 10e3/uL    Absolute Basophils 0.1 0.0 - 0.2 10e3/uL    Absolute Immature Granulocytes 0.0 <=0.4 10e3/uL    Absolute NRBCs 0.0 10e3/uL   Adult Type and Screen    Collection Time: 08/12/22  9:38 AM   Result Value Ref Range    ABO/RH(D) AB POS     Antibody Screen Negative Negative    SPECIMEN EXPIRATION DATE [id number]    Extra Blue Top Tube    Collection  Time: 08/12/22  9:39 AM   Result Value Ref Range    Hold Specimen JIC    Extra Red Top Tube    Collection Time: 08/12/22  9:39 AM   Result Value Ref Range    Hold Specimen JIC    Extra Green Top (Lithium Heparin) Tube    Collection Time: 08/12/22  9:39 AM   Result Value Ref Range    Hold Specimen JIC    Extra Purple Top Tube    Collection Time: 08/12/22  9:39 AM   Result Value Ref Range    Hold Specimen JIC    Extra Purple Top Tube    Collection Time: 08/12/22  9:39 AM   Result Value Ref Range    Hold Specimen JIC    B-Type Natriuretic Peptide ([facility] Only)    Collection Time: 08/12/22  9:39 AM   Result Value Ref Range     (H) 0 - 93 pg/mL   Basic metabolic panel    Collection Time: 08/12/22 11:35 AM   Result Value Ref Range    Sodium 140 136 - 145 mmol/L    Potassium 4.0 3.5 - 5.0 mmol/L    Chloride 107 98 - 107 mmol/L    Carbon Dioxide (CO2) 29 22 - 31 mmol/L    Anion Gap 4 (L) 5 - 18 mmol/L    Urea Nitrogen 14 8 - 28 mg/dL    Creatinine 0.81 0.70 - 1.30 mg/dL    Calcium 8.7 8.5 - 10.5 mg/dL    Glucose 96 70 - 125 mg/dL    GFR Estimate 86 >60 mL/min/1.73m2   Hepatic function panel    Collection Time: 08/12/22 11:35 AM   Result Value Ref Range    Bilirubin Total 0.4 0.0 - 1.0 mg/dL    Bilirubin Direct 0.1 <=0.5 mg/dL    Protein Total 6.2 6.0 - 8.0 g/dL    Albumin 3.1 (L) 3.5 - 5.0 g/dL    Alkaline Phosphatase 97 45 - 120 U/L    AST 23 0 - 40 U/L    ALT 15 0 - 45 U/L   CK total    Collection Time: 08/12/22 11:35 AM   Result Value Ref Range     (H) 30 - 190 U/L   Asymptomatic COVID-19 Virus (Coronavirus) by PCR Nasopharyngeal    Collection Time: 08/12/22  1:20 PM    Specimen: Nasopharyngeal; Swab   Result Value Ref Range    SARS CoV2 PCR Positive (A) Negative   Echocardiogram Complete    Collection Time: 08/12/22  3:01 PM   Result Value Ref Range    LVEF  55-60%    UA with Microscopic reflex to Culture    Collection Time: 08/12/22  3:28 PM    Specimen: Urine, Midstream   Result Value Ref Range    Color  Urine Light Yellow Colorless, Straw, Light Yellow, Yellow    Appearance Urine Clear Clear    Glucose Urine Negative Negative mg/dL    Bilirubin Urine Negative Negative    Ketones Urine 10  (A) Negative mg/dL    Specific Gravity Urine 1.018 1.001 - 1.030    Blood Urine 0.03 mg/dL (A) Negative    pH Urine 5.0 5.0 - 7.0    Protein Albumin Urine Negative Negative mg/dL    Urobilinogen Urine <2.0 <2.0 mg/dL    Nitrite Urine Negative Negative    Leukocyte Esterase Urine Negative Negative    Mucus Urine Present (A) None Seen /LPF    RBC Urine 0 <=2 /HPF    WBC Urine <1 <=5 /HPF     All lab studies reviewed personally  Radiology report reviewed.      Vonda Johnson DO  Hospitalist Service  [facility]  Text page via [facility] Paging/Directory

## 2022-08-12 NOTE — ED NOTES
Sent message to provider regarding wound on patient's penis located between the head and the shaft of the penis. Asked provider to consider a GI consult.

## 2022-08-12 NOTE — CONSULTS
Care Management Initial Consult    General Information  Assessment completed with: Family, Orly Ruth  Type of CM/SW Visit: Initial Assessment    Primary Care Provider verified and updated as needed: Yes   Readmission within the last 30 days:        Reason for Consult: discharge planning  Advance Care Planning:            Communication Assessment  Patient's communication style: spoken language (English or Bilingual)             Cognitive  Cognitive/Neuro/Behavioral: orientation, .WDL except  Level of Consciousness: confused  Arousal Level: opens eyes spontaneously  Orientation: person, place  Mood/Behavior: hypoactive (quiet, withdrawn)     Speech: hoarse, hesitant, slow    Living Environment:   People in home: alone     Current living Arrangements: independent living facility      Able to return to prior arrangements: yes       Family/Social Support:  Care provided by: self  Provides care for: no one  Marital Status: Single  Sibling(s), Other (specify) (nieces)          Description of Support System: Involved, Supportive    Support Assessment: Adequate family and caregiver support, Adequate social supports    Current Resources:   Patient receiving home care services: No     Community Resources:  (Physical Therapy)  Equipment currently used at home: walker, standard  Supplies currently used at home: None    Employment/Financial:  Employment Status:          Financial Concerns:             Lifestyle & Psychosocial Needs:  Social Determinants of Health     Tobacco Use: Not on file   Alcohol Use: Not on file   Financial Resource Strain: Not on file   Food Insecurity: Not on file   Transportation Needs: Not on file   Physical Activity: Not on file   Stress: Not on file   Social Connections: Not on file   Intimate Partner Violence: Not on file   Depression: Not on file   Housing Stability: Not on file       Functional Status:  Prior to admission patient needed assistance:   Dependent ADLs:: Independent  Dependent  IADLs:: Cleaning, Cooking, Laundry, Shopping, Meal Preparation, Medication Management, Money Management, Transportation  Assesssment of Functional Status: Not at baseline with ADL Functioning    Mental Health Status:  Mental Health Status: No Current Concerns       Chemical Dependency Status:  Chemical Dependency Status: No Current Concerns             Values/Beliefs:  Spiritual, Cultural Beliefs, Oriental orthodox Practices, Values that affect care: no               Additional Information:  OPAL assessed, COVID+ 8/12. CM spoke with pt's niece who is bedside due to patient's current confusion. She states that the pt is independent with ADLs at baseline and receives assistance with IADLs through his facility. She reports that his current physical and cognitive state are far from his baseline, he appears significantly debilitated and confused. She is agreeable to TCU placement to help him with strengthening, endurance and balance. TATIANA sent referrals to TCUs with known COVID units.     BREANN paez.     Eun Smart LGSW

## 2022-08-12 NOTE — ED NOTES
"[facility] ED Handoff Report    ED Chief Complaint: Fall, weakness  ED Diagnosis:  (W19.XXXA) Fall, initial encounter  Comment:   Plan:     (M62.81) Generalized muscle weakness  Comment:   Plan:     (U07.1) Infection due to 2019 novel coronavirus  Comment:   Plan:        PMH:  History reviewed. No pertinent past medical history.     Code Status:  No CPR- Do NOT Intubate     Falls Risk: Yes Band: Applied    Current Living Situation/Residence: lives in an assisted living facility     Elimination Status: Continent: wears briefs     Activity Level: 2 assist    Patients Preferred Language:  English     Needed: No    Vital Signs:  /52   Pulse 73   Temp 99  F (37.2  C) (Oral)   Resp 23   Ht 1.778 m (5' 10\")   Wt 83.9 kg (185 lb)   SpO2 94%   BMI 26.54 kg/m       Cardiac Rhythm: NSR    Pain Score: 5/10    Is the Patient Confused:  Yes    Last Food or Drink: 08/12/22 at 1200    Focused Assessment:   Patient has a history of anemia, s/p CABG, and hypertension who presents to the ED by EMS for the evaluation of fall.     Patient lives independently at an assisted living facility and was found with an abrasion on his right knee and ecchymosis on the right side of his head after an unwittednessed fall. His neighbor heard him yell and called EMS. He is able to stand with help, but is unable to walk after the fall which is new. He does use a walker at baseline. No head injuries noted. His mental status compared to baseline is unknown.    Patient is unsure of when he fell. He has stiffness in his neck and pain in his left lower leg.     Per patients niece says her sister saw him last night and he was fine. She feels his legs are getting weak and he's in senior leaving.     Tests Performed: Done: Labs and Imaging    Treatments Provided:  See MAR    Family Dynamics/Concerns: No    Family Updated On Visitor Policy: Yes    Plan of Care Communicated to Family: Yes    Who Was Updated about Plan of Care: " Patient and Niece    Belongings Checklist Done and Signed by Patient: No    Medications sent with patient: NA    Covid: symptomatic, positive    Additional Information:     RN: Vonda Mustafa RN   8/12/2022 5:04 PM

## 2022-08-12 NOTE — ED NOTES
Bed: JNEDP-02  Expected date: 8/12/22  Expected time: 9:20 AM  Means of arrival: Ambulance  Comments:  Allkaren- 87yo M fall

## 2022-08-12 NOTE — ED PROVIDER NOTES
EMERGENCY DEPARTMENT ENCOUNTER      NAME: George R Milligan  AGE: 86 year old male  YOB: 1935  MRN: [id number]  EVALUATION DATE & TIME: 8/12/2022  9:25 AM    PCP: Blu Chen    ED PROVIDER: Denisa Boyd M.D.      CHIEF COMPLAINT     Chief Complaint   Patient presents with     Fall     Altered Mental Status         FINAL IMPRESSION:     1. Fall, initial encounter    2. Generalized muscle weakness    3. Infection due to 2019 novel coronavirus          MEDICAL DECISION MAKING:       Pertinent Labs & Imaging studies reviewed. (See chart for details)    86 year old male presents to the Emergency Department for evaluation of fall    ED Course as of 08/12/22 1519   Fri Aug 12, 2022   1233 Mr. Milligan is a 85-year-old male who presents here via EMS.  He lives in senior living.  He fell he does not know why he fell does not even remember when he fell.  EMS reported that he was able to stand with 2 acids but was unable to walk because he was so weak.   1233 On examination he is pale elderly oriented x2.  Does not know what happened to him denies any headache some neck pain denies any thoracic or lumbar pain no abdominal pain.  Complain of pain on the left knee and left tib-fib but have full range of motion.   1233 Patient denies having any chest pain or shortness of breath prior to falling but does not recall why he fell.   1234 On examination he is pale appears in no distress no evidence of head trauma no hyphema no hemotympanum right paravertebral cervical tenderness palpation no thoracic lumbar tenderness palpation abdomen is protuberant diastasis recti with umbilical hernia surgical scar is nontender he moves upper and lower extremities equally he is able to flex both of his legs without assist unable to plantarflex and dorsiflex.   1234 Osakis Coma Scale of 15 oriented x2   1234 Differential diagnoses include but not limited to accidental fall arrhythmia generalized weakness trauma head cervical  lumbar.  Patient denies any fecal or any incontinence no perianal anesthesia.   1235 Trauma evaluation included a CT head CT C-spine   1235 CT head no acute bleed or fracture.  Soft tissue subgaleal hematoma.  CT C-spine degenerative no fracture.  Cervical spine.  Clinically and radiologically.   1317 Mild elevation of the BNP at 228 normal liver function test normal lipase normal renal function.  Normal hemoglobin and white blood cell count troponin 0.03.   1457 Troponin I: 0.03   1517 It is noted that patient has not undergone his CT chest and abdomen.  I called CT so this could be released.'s signout to Dr. Giron pending results.  Also requested nurse to apply for UA to evaluate for infection.   1517 Patient notified of his positive COVID status.   1518 Clinical impression and decision making 86-year-old male presents here with generalized weakness fell confused appears at baseline per knees.  Anticoagulated.  Trauma evaluation included a CT head CT spine no acute CT chest and abdomen currently pending patient positive for COVID.  Mild elevation of the CK of K at 637.  Pending urine analysis for antibiotics.  We will admit him for generalized weakness unable to leave and current conditions because of safety concerns.  At the time of dictation patient is awaiting inpatient bed.       Vital Signs: Reviewed  EKG: Sinus  Imaging: CT head no acute disease spinal fracture CT chest and abdomen pelvis them.  Home Meds:reviewed  ED meds/abx:  none  Fluids: oral    Labs  K 4  Cr 0.81  Wbc 7.6  Hgb 14  Platelets 303    COVID positive      Review of Previous Records  ED visit on 6/17/2022 for generalized weakness.      Consults  Care manager - Eun  Hospitalist - Dr. Velarde      ED COURSE     9:25 AM I introduced myself to the patient, obtained patient history, performed a physical exam, and discussed plan for ED workup including potential diagnostic laboratory/imaging studies and interventions.    12:37 PM I  rechecked with the patient.     1:07 PM I paged for the hospitalist.    1:20 PM I spoke to hospitalist Dr. Johnson regarding plan for med surge obs admissions.    2:58 PM reevaluated and updated.    At the conclusion of the encounter I discussed the results of all of the tests and the disposition. The questions were answered. The patient and niece acknowledged understanding and was agreeable with the care plan.           MEDICATIONS GIVEN IN THE EMERGENCY:     Medications   lidocaine 1 % 0.1-1 mL (has no administration in time range)   lidocaine (LMX4) cream (has no administration in time range)   sodium chloride (PF) 0.9% PF flush 3 mL (has no administration in time range)   sodium chloride (PF) 0.9% PF flush 3 mL (has no administration in time range)   acetaminophen (TYLENOL) tablet 650 mg (has no administration in time range)     Or   acetaminophen (TYLENOL) Suppository 650 mg (has no administration in time range)   melatonin tablet 1 mg (has no administration in time range)   ondansetron (ZOFRAN ODT) ODT tab 4 mg (has no administration in time range)     Or   ondansetron (ZOFRAN) injection 4 mg (has no administration in time range)   enoxaparin ANTICOAGULANT (LOVENOX) injection 40 mg (has no administration in time range)   senna-docusate (SENOKOT-S/PERICOLACE) 8.6-50 MG per tablet 1 tablet (has no administration in time range)     Or   senna-docusate (SENOKOT-S/PERICOLACE) 8.6-50 MG per tablet 2 tablet (has no administration in time range)   aspirin (ASA) chewable tablet 81 mg (has no administration in time range)   Vitamin D3 (CHOLECALCIFEROL) tablet 1,000 Units (has no administration in time range)   lisinopril (ZESTRIL) tablet 20 mg (has no administration in time range)   metoprolol succinate ER (TOPROL XL) 24 hr tablet 50 mg (has no administration in time range)   perflutren lipid microsphere (DEFINITY) injection SUSP 3 mL (3 mLs Intravenous Given 8/12/22 1450)       NEW PRESCRIPTIONS STARTED AT TODAY'S ER  VISIT     New Prescriptions    No medications on file          =================================================================    HPI     Patient information was obtained from: Patient, EMS    Use of : N/A        George R Milligan is a 86 year old male with a history of anemia, s/p CABG, and hypertension who presents to the ED by EMS for the evaluation of fall.    Per EMS, the patient lives independently at an assisted living facility and was found with an abrasion on his right knee and ecchymosis on the right side of his head after an unwittednessed fall. His neighbor heard him yell and called EMS. He is able to stand with help, but is unable to walk after the fall which is new. He does use a walker at baseline. No head injuries noted. He has a history of heart surgery and his EKG is artifact. His BP is around 129/81. His mental status compared to baseline is unknown.    Per patient, he is unsure of when he fell. He denies any head, eye, nose, throat, chest, abdominal, back and leg pain. He has stiffness in his neck. He denies any vomiting, diarrhea, blood in his stool, fever and headache. He does not smoke, drink alcohol or do any recreational drugs.     Per patients niece says her sister saw him last night and he was fine. She feels his legs are getting weak and he's in senior leaving. She wants to talk about higher level of care.     REVIEW OF SYSTEMS   Review of Systems   Constitutional: Negative for chills and fever.   Respiratory: Negative for cough and shortness of breath.    Cardiovascular: Negative for chest pain.   Gastrointestinal: Negative for blood in stool, diarrhea and vomiting.   Musculoskeletal: Positive for neck stiffness. Negative for arthralgias and back pain.   Neurological: Negative for headaches.   All other systems reviewed and are negative.      PAST MEDICAL HISTORY:   History reviewed. No pertinent past medical history.    PAST SURGICAL HISTORY:   History reviewed. No  "pertinent surgical history.      CURRENT MEDICATIONS:   aspirin 81 mg chewable tablet  cholecalciferol, vitamin D3, 1,000 unit tablet  lisinopril (PRINIVIL,ZESTRIL) 20 MG tablet  metoprolol succinate (TOPROL-XL) 50 MG 24 hr tablet         ALLERGIES:     Allergies   Allergen Reactions     Atorvastatin Muscle Pain (Myalgia)     Lopid [Gemfibrozil] Muscle Pain (Myalgia)     Pravachol [Pravastatin] Unknown       FAMILY HISTORY:   No family history on file.    SOCIAL HISTORY:     Social History     Socioeconomic History     Marital status: Single       VITALS:   /52   Pulse 73   Temp 99  F (37.2  C) (Oral)   Resp 23   Ht 1.778 m (5' 10\")   Wt 83.9 kg (185 lb)   SpO2 94%   BMI 26.54 kg/m      PHYSICAL EXAM     Physical Exam  Vitals and nursing note reviewed.   Constitutional:       Appearance: He is obese.   Neurological:      Mental Status: He is alert.   Psychiatric:         Mood and Affect: Mood normal.         Physical Exam   Constitutional: Fragile, elderly, pale, cooperative with exam.    Head: Atraumatic.     Nose: Nose normal.     Mouth/Throat: Oropharynx is clear and moist.     Eyes: EOM are normal. Pupils are equal, round, and reactive to light.     Ears: External ears normal    Neck: Normal range of motion. Neck supple. Midline C-spine tenderness to palpation.    Cardiovascular: Normal rate, regular rhythm and normal heart sounds.      Pulmonary/Chest: Normal effort, bibasilar crackles to auscultation      Abdominal: Midline scar, umbilical hernia, diastasis recti    Musculoskeletal: Normal range of motion bilateral upper extremities. No midline thoracic or lumbar tenderness to palpation.    Neurological: Oriented 2x    Lymphatics: 2+ lower extremity edema    : Wearing adult diaper, circumcised male    Skin: Skin is warm and dry. Ecchymosis to the right side of the head.    Psychiatric: Normal mood and affect. Behavior is normal.       LAB:     All pertinent labs reviewed and interpreted.  Labs " Ordered and Resulted from Time of ED Arrival to Time of ED Departure   BASIC METABOLIC PANEL - Abnormal       Result Value    Sodium 140      Potassium 4.0      Chloride 107      Carbon Dioxide (CO2) 29      Anion Gap 4 (*)     Urea Nitrogen 14      Creatinine 0.81      Calcium 8.7      Glucose 96      GFR Estimate 86     HEPATIC FUNCTION PANEL - Abnormal    Bilirubin Total 0.4      Bilirubin Direct 0.1      Protein Total 6.2      Albumin 3.1 (*)     Alkaline Phosphatase 97      AST 23      ALT 15     B-TYPE NATRIURETIC PEPTIDE ( EAST ONLY) - Abnormal     (*)    CBC WITH PLATELETS AND DIFFERENTIAL - Abnormal    WBC Count 7.6      RBC Count 4.40      Hemoglobin 14.0      Hematocrit 42.7      MCV 97      MCH 31.8      MCHC 32.8      RDW 12.6      Platelet Count 303      % Neutrophils 76      % Lymphocytes 6      % Monocytes 16      % Eosinophils 0      % Basophils 1      % Immature Granulocytes 1      NRBCs per 100 WBC 0      Absolute Neutrophils 5.9      Absolute Lymphocytes 0.5 (*)     Absolute Monocytes 1.2      Absolute Eosinophils 0.0      Absolute Basophils 0.1      Absolute Immature Granulocytes 0.0      Absolute NRBCs 0.0     CK TOTAL - Abnormal     (*)    COVID-19 VIRUS (CORONAVIRUS) BY PCR - Abnormal    SARS CoV2 PCR Positive (*)    LIPASE - Normal    Lipase 34     TROPONIN I - Normal    Troponin I 0.03     MAGNESIUM - Normal    Magnesium 2.2     INR - Normal    INR 1.04     ROUTINE UA WITH MICROSCOPIC REFLEX TO CULTURE   TYPE AND SCREEN, ADULT    ABO/RH(D) AB POS      Antibody Screen Negative      SPECIMEN EXPIRATION DATE [id number]     ABO/RH TYPE AND SCREEN        RADIOLOGY:     Reviewed all pertinent imaging. Please see official radiology report.  Cervical spine CT w/o contrast   Final Result   IMPRESSION:   HEAD CT:   1.  Moderate volume loss and advanced presumed sequela of chronic microvascular ischemic change.      2.  No finding for intracranial hemorrhage.      3.  Superficial  soft tissue contusion/subgaleal hematoma seen along the right parietal scalp.      CERVICAL SPINE CT:   1.  Moderate cervical spondylosis with grade 1 anterolisthesis of C5 on C6 and C6 on C7. Although listhesis is age-indeterminate it is favored to be chronic and degenerative in etiology. No prevertebral soft tissue swelling.      2.  No finding for acute fracture.      Head CT w/o contrast   Final Result   IMPRESSION:   HEAD CT:   1.  Moderate volume loss and advanced presumed sequela of chronic microvascular ischemic change.      2.  No finding for intracranial hemorrhage.      3.  Superficial soft tissue contusion/subgaleal hematoma seen along the right parietal scalp.      CERVICAL SPINE CT:   1.  Moderate cervical spondylosis with grade 1 anterolisthesis of C5 on C6 and C6 on C7. Although listhesis is age-indeterminate it is favored to be chronic and degenerative in etiology. No prevertebral soft tissue swelling.      2.  No finding for acute fracture.      CTA Chest Abdomen Pelvis w Contrast    (Results Pending)   XR Knee Left 1/2 Views    (Results Pending)   XR Tibia and Fibula Left 2 Views    (Results Pending)   Echocardiogram Complete    (Results Pending)        EKG:     EKG #1  Normal sinus rhythm normal anterior progression normal axis Baseline is irregular    Time: 095201    Ventricular rate 75 bmp  Axis normal  WV interval 208 ms  QRS duration 78 ms  QT//433 ms    Compared to previous EKG on June 2022 first degree av block not now  I have independently reviewed and interpreted the EKG(s) documented above.      PROCEDURES:     Procedures      I, Roscoe Hyde, am serving as a scribe to document services personally performed by Dr. Boyd based on my observation and the provider's statements to me. I, Denisa Boyd MD attest that Roscoe Hyde is acting in a scribe capacity, has observed my performance of the services and has documented them in accordance with my direction.    Denisa Boyd  M.D.  Emergency Medicine  [facility] EMERGENCY DEPARTMENT  [facility] [facility] [id number] [phone]  Dept: [phone]     Denisa Boyd MD  08/12/22 3273

## 2022-08-12 NOTE — PHARMACY-ADMISSION MEDICATION HISTORY
Pharmacy Note - Admission Medication History    Pertinent Provider Information: Patient was taking fish oil but has difficulty swallowing them.     ______________________________________________________________________    Prior To Admission (PTA) med list completed and updated in EMR.       PTA Med List   Medication Sig Last Dose     aspirin 81 mg chewable tablet [ASPIRIN 81 MG CHEWABLE TABLET] Chew 81 mg daily. 8/11/2022 at pm     cholecalciferol, vitamin D3, 1,000 unit tablet [CHOLECALCIFEROL, VITAMIN D3, 1,000 UNIT TABLET] Take 1,000 Units by mouth daily. 8/11/2022 at pm     lisinopril (PRINIVIL,ZESTRIL) 20 MG tablet [LISINOPRIL (PRINIVIL,ZESTRIL) 20 MG TABLET] Take 20 mg by mouth daily. 8/11/2022 at pm     metoprolol succinate (TOPROL-XL) 50 MG 24 hr tablet [METOPROLOL SUCCINATE (TOPROL-XL) 50 MG 24 HR TABLET] Take 50 mg by mouth daily. 8/11/2022 at pm       Information source(s): Family member and [employer]/Irina  Method of interview communication: in-person    Summary of Changes to PTA Med List  New: None  Discontinued: Plavix, Crestor  Changed: none    Patient was asked about OTC/herbal products specifically.  PTA med list reflects this.    In the past week, patient estimated taking medication this percent of the time:  greater than 90%.    Allergies were reviewed, assessed, and updated with the patient.      Patient does not use any multi-dose medications prior to admission.    The information provided in this note is only as accurate as the sources available at the time of the update(s).    Thank you for the opportunity to participate in the care of this patient.    Margareth Velez RPH  8/12/2022 1:26 PM

## 2022-08-13 ENCOUNTER — APPOINTMENT (OUTPATIENT)
Dept: OCCUPATIONAL THERAPY | Facility: HOSPITAL | Age: 87
DRG: 177 | End: 2022-08-13
Payer: MEDICARE

## 2022-08-13 ENCOUNTER — APPOINTMENT (OUTPATIENT)
Dept: PHYSICAL THERAPY | Facility: HOSPITAL | Age: 87
DRG: 177 | End: 2022-08-13
Attending: INTERNAL MEDICINE
Payer: MEDICARE

## 2022-08-13 ENCOUNTER — APPOINTMENT (OUTPATIENT)
Dept: OCCUPATIONAL THERAPY | Facility: HOSPITAL | Age: 87
DRG: 177 | End: 2022-08-13
Attending: INTERNAL MEDICINE
Payer: MEDICARE

## 2022-08-13 LAB
ALBUMIN SERPL-MCNC: 3.4 G/DL (ref 3.5–5)
ALP SERPL-CCNC: 105 U/L (ref 45–120)
ALT SERPL W P-5'-P-CCNC: 19 U/L (ref 0–45)
ANION GAP SERPL CALCULATED.3IONS-SCNC: 9 MMOL/L (ref 5–18)
AST SERPL W P-5'-P-CCNC: 43 U/L (ref 0–40)
ATRIAL RATE - MUSE: 74 BPM
BASOPHILS # BLD AUTO: 0 10E3/UL (ref 0–0.2)
BASOPHILS NFR BLD AUTO: 1 %
BILIRUB SERPL-MCNC: 0.4 MG/DL (ref 0–1)
BUN SERPL-MCNC: 17 MG/DL (ref 8–28)
CALCIUM SERPL-MCNC: 9 MG/DL (ref 8.5–10.5)
CHLORIDE BLD-SCNC: 104 MMOL/L (ref 98–107)
CO2 SERPL-SCNC: 26 MMOL/L (ref 22–31)
CREAT SERPL-MCNC: 0.88 MG/DL (ref 0.7–1.3)
DIASTOLIC BLOOD PRESSURE - MUSE: 68 MMHG
EOSINOPHIL # BLD AUTO: 0 10E3/UL (ref 0–0.7)
EOSINOPHIL NFR BLD AUTO: 0 %
ERYTHROCYTE [DISTWIDTH] IN BLOOD BY AUTOMATED COUNT: 12.8 % (ref 10–15)
GFR SERPL CREATININE-BSD FRML MDRD: 84 ML/MIN/1.73M2
GLUCOSE BLD-MCNC: 96 MG/DL (ref 70–125)
HCT VFR BLD AUTO: 43.6 % (ref 40–53)
HGB BLD-MCNC: 14.2 G/DL (ref 13.3–17.7)
IMM GRANULOCYTES # BLD: 0 10E3/UL
IMM GRANULOCYTES NFR BLD: 1 %
INTERPRETATION ECG - MUSE: NORMAL
LYMPHOCYTES # BLD AUTO: 1.4 10E3/UL (ref 0.8–5.3)
LYMPHOCYTES NFR BLD AUTO: 18 %
MCH RBC QN AUTO: 31.8 PG (ref 26.5–33)
MCHC RBC AUTO-ENTMCNC: 32.6 G/DL (ref 31.5–36.5)
MCV RBC AUTO: 98 FL (ref 78–100)
MONOCYTES # BLD AUTO: 1.4 10E3/UL (ref 0–1.3)
MONOCYTES NFR BLD AUTO: 19 %
NEUTROPHILS # BLD AUTO: 4.8 10E3/UL (ref 1.6–8.3)
NEUTROPHILS NFR BLD AUTO: 61 %
NRBC # BLD AUTO: 0 10E3/UL
NRBC BLD AUTO-RTO: 0 /100
P AXIS - MUSE: 92 DEGREES
PLATELET # BLD AUTO: 293 10E3/UL (ref 150–450)
POTASSIUM BLD-SCNC: 3.7 MMOL/L (ref 3.5–5)
PR INTERVAL - MUSE: 242 MS
PROT SERPL-MCNC: 6.8 G/DL (ref 6–8)
QRS DURATION - MUSE: 66 MS
QT - MUSE: 388 MS
QTC - MUSE: 430 MS
R AXIS - MUSE: 5 DEGREES
RBC # BLD AUTO: 4.47 10E6/UL (ref 4.4–5.9)
SODIUM SERPL-SCNC: 139 MMOL/L (ref 136–145)
SYSTOLIC BLOOD PRESSURE - MUSE: 138 MMHG
T AXIS - MUSE: 17 DEGREES
VENTRICULAR RATE- MUSE: 74 BPM
WBC # BLD AUTO: 7.6 10E3/UL (ref 4–11)

## 2022-08-13 PROCEDURE — 97166 OT EVAL MOD COMPLEX 45 MIN: CPT | Mod: GO

## 2022-08-13 PROCEDURE — 93005 ELECTROCARDIOGRAM TRACING: CPT

## 2022-08-13 PROCEDURE — 97129 THER IVNTJ 1ST 15 MIN: CPT | Mod: GO

## 2022-08-13 PROCEDURE — 97161 PT EVAL LOW COMPLEX 20 MIN: CPT | Mod: GP

## 2022-08-13 PROCEDURE — 250N000011 HC RX IP 250 OP 636: Performed by: INTERNAL MEDICINE

## 2022-08-13 PROCEDURE — 97530 THERAPEUTIC ACTIVITIES: CPT | Mod: GP

## 2022-08-13 PROCEDURE — 85025 COMPLETE CBC W/AUTO DIFF WBC: CPT | Performed by: INTERNAL MEDICINE

## 2022-08-13 PROCEDURE — 96372 THER/PROPH/DIAG INJ SC/IM: CPT | Performed by: INTERNAL MEDICINE

## 2022-08-13 PROCEDURE — 250N000013 HC RX MED GY IP 250 OP 250 PS 637: Performed by: INTERNAL MEDICINE

## 2022-08-13 PROCEDURE — 80053 COMPREHEN METABOLIC PANEL: CPT | Performed by: INTERNAL MEDICINE

## 2022-08-13 PROCEDURE — G0378 HOSPITAL OBSERVATION PER HR: HCPCS

## 2022-08-13 PROCEDURE — 99233 SBSQ HOSP IP/OBS HIGH 50: CPT | Mod: CS | Performed by: FAMILY MEDICINE

## 2022-08-13 PROCEDURE — 99204 OFFICE O/P NEW MOD 45 MIN: CPT | Performed by: INTERNAL MEDICINE

## 2022-08-13 PROCEDURE — 97535 SELF CARE MNGMENT TRAINING: CPT | Mod: GO

## 2022-08-13 PROCEDURE — 36415 COLL VENOUS BLD VENIPUNCTURE: CPT | Performed by: INTERNAL MEDICINE

## 2022-08-13 PROCEDURE — 93010 ELECTROCARDIOGRAM REPORT: CPT | Mod: HIP | Performed by: INTERNAL MEDICINE

## 2022-08-13 RX ORDER — ROSUVASTATIN CALCIUM 5 MG/1
5 TABLET, COATED ORAL DAILY
Status: DISCONTINUED | OUTPATIENT
Start: 2022-08-13 | End: 2022-08-23 | Stop reason: HOSPADM

## 2022-08-13 RX ADMIN — Medication 1 MG: at 20:23

## 2022-08-13 RX ADMIN — ACETAMINOPHEN 650 MG: 325 TABLET ORAL at 20:23

## 2022-08-13 RX ADMIN — ROSUVASTATIN CALCIUM 5 MG: 5 TABLET, FILM COATED ORAL at 20:23

## 2022-08-13 RX ADMIN — Medication 1000 UNITS: at 20:23

## 2022-08-13 RX ADMIN — ENOXAPARIN SODIUM 40 MG: 40 INJECTION SUBCUTANEOUS at 13:14

## 2022-08-13 RX ADMIN — Medication 1 MG: at 01:47

## 2022-08-13 RX ADMIN — ASPIRIN 81 MG CHEWABLE TABLET 81 MG: 81 TABLET CHEWABLE at 20:23

## 2022-08-13 RX ADMIN — LISINOPRIL 20 MG: 20 TABLET ORAL at 20:23

## 2022-08-13 ASSESSMENT — ACTIVITIES OF DAILY LIVING (ADL)
ADLS_ACUITY_SCORE: 35
ADLS_ACUITY_SCORE: 39
ADLS_ACUITY_SCORE: 33
ADLS_ACUITY_SCORE: 35
ADLS_ACUITY_SCORE: 39
ADLS_ACUITY_SCORE: 35
ADLS_ACUITY_SCORE: 43
ADLS_ACUITY_SCORE: 39
ADLS_ACUITY_SCORE: 35
ADLS_ACUITY_SCORE: 35

## 2022-08-13 NOTE — PLAN OF CARE
PRIMARY DIAGNOSIS: ACUTE PAIN  OUTPATIENT/OBSERVATION GOALS TO BE MET BEFORE DISCHARGE:  1. Pain Status: Pain free.    2. Return to near baseline physical activity: Yes    3. Cleared for discharge by consultants (if involved): No    Discharge Planner Nurse   Safe discharge environment identified: Yes  Barriers to discharge: Yes       Entered by: Luis Alberto Green RN 08/13/2022 3:04 PM     Please review provider order for any additional goals.   Nurse to notify provider when observation goals have been met and patient is ready for discharge.Goal Outcome Evaluation:    Plan of Care Reviewed With: patient

## 2022-08-13 NOTE — PLAN OF CARE
PRIMARY DIAGNOSIS: ACUTE PAIN  OUTPATIENT/OBSERVATION GOALS TO BE MET BEFORE DISCHARGE:  1. Pain Status: Pain free.    2. Return to near baseline physical activity: No    3. Cleared for discharge by consultants (if involved): No    Discharge Planner Nurse   Safe discharge environment identified: Yes  Barriers to discharge: Yes       Entered by: Luis Alberto Green RN 08/13/2022 4:36 PM   Pt is alert to self. Pt is med complaint. Pt denies pain. Pt's v/s is stable. Pt is continue to be on 1:1 sitter for the safety.Continue to monitor pt.  Please review provider order for any additional goals.   Nurse to notify provider when observation goals have been met and patient is ready for discharge.Goal Outcome Evaluation:    Plan of Care Reviewed With: patient

## 2022-08-13 NOTE — PROGRESS NOTES
Nutrition Note    Received nutrition consult.  RD cannot consult on observation patients or outpatients in beds d/t licensure issues and CMS conditions of participation.  RD will monitor for any status changes and will complete consult if patient changes to inpatient status.

## 2022-08-13 NOTE — PLAN OF CARE
Problem: Plan of Care - These are the overarching goals to be used throughout the patient stay.    Goal: Optimal Comfort and Wellbeing  Outcome: Ongoing, Progressing     Problem: Fatigue  Goal: Improved Activity Tolerance  Outcome: Ongoing, Progressing   Goal Outcome Evaluation:    Pt is A&O x2, disoriented to time and situation and confused. Attempted to get out of bed but unsuccessful, triggering bed alarm 3x during night shift. COVID-19 isolation procautions maintain. Using primo fit.Denies any pain for noc shift

## 2022-08-13 NOTE — PROGRESS NOTES
[facility]      OUTPATIENT PHYSICAL THERAPY EVALUATION  PLAN OF TREATMENT FOR OUTPATIENT REHABILITATION  (COMPLETE FOR INITIAL CLAIMS ONLY)  Patient's Last Name, First Name, M.I.  YOB: 1935  Milligan,George R                        Provider's Name  [facility] Medical Record No.  [id number]                               Onset Date:  08/12/22   Start of Care Date:  08/13/22      Type:     _X_PT   ___OT   ___SLP Medical Diagnosis:  Fall, generalized weakness, confusion.                        PT Diagnosis:  Impaired functional mobility and activity tolerance, high falls risk.   Visits from SOC:  1   _________________________________________________________________________________  Plan of Treatment/Functional Goals    Planned Interventions: balance training, bed mobility training, gait training, home exercise program, patient/family education, strengthening, transfer training     Goals: See Physical Therapy Goals on Care Plan in [facility] electronic health record.    Therapy Frequency: Daily  Predicted Duration of Therapy Intervention: 08/20/22  _________________________________________________________________________________    I CERTIFY THE NEED FOR THESE SERVICES FURNISHED UNDER        THIS PLAN OF TREATMENT AND WHILE UNDER MY CARE     (Physician co-signature of this document indicates review and certification of the therapy plan).                Certification date from: 08/13/22, Certification date to: 08/20/22    Referring Physician: Dr. Tracie Kellogg.            Initial Assessment        See Physical Therapy evaluation dated 08/13/22 in Epic electronic health record.

## 2022-08-13 NOTE — PLAN OF CARE
"  Problem: Plan of Care - These are the overarching goals to be used throughout the patient stay.    Goal: Plan of Care Review/Shift Note  Description: The Plan of Care Review/Shift note should be completed every shift.  The Outcome Evaluation is a brief statement about your assessment that the patient is improving, declining, or no change.  This information will be displayed automatically on your shift note.  Outcome: Ongoing, Progressing  Flowsheets (Taken 8/13/2022 1355)  Plan of Care Reviewed With: patient  Goal: Patient-Specific Goal (Individualized)  Description: You can add care plan individualizations to a care plan. Examples of Individualization might be:  \"Parent requests to be called daily at 9am for status\", \"I have a hard time hearing out of my right ear\", or \"Do not touch me to wake me up as it startles me\".  Outcome: Ongoing, Progressing     Problem: Pain Acute  Goal: Acceptable Pain Control and Functional Ability  Outcome: Ongoing, Progressing  Intervention: Prevent or Manage Pain  Recent Flowsheet Documentation  Taken 8/13/2022 1135 by Luis Alberto Green RN  Medication Review/Management: medications reviewed  Taken 8/13/2022 0800 by Luis Alberto Green RN  Medication Review/Management: medications reviewed     Problem: Hypertension Comorbidity  Goal: Blood Pressure in Desired Range  Outcome: Ongoing, Progressing  Intervention: Maintain Blood Pressure Management  Recent Flowsheet Documentation  Taken 8/13/2022 1135 by Luis Alberto Green RN  Medication Review/Management: medications reviewed  Taken 8/13/2022 0800 by Luis Alberto Green RN  Medication Review/Management: medications reviewed     Problem: Pain Chronic (Persistent) (Comorbidity Management)  Goal: Acceptable Pain Control and Functional Ability  Outcome: Ongoing, Progressing  Intervention: Manage Persistent Pain  Recent Flowsheet Documentation  Taken 8/13/2022 1135 by Luis Alberto Green RN  Medication Review/Management: medications reviewed  Taken " 8/13/2022 0800 by Luis Alberto Green RN  Medication Review/Management: medications reviewed     Problem: Fatigue  Goal: Improved Activity Tolerance  Outcome: Ongoing, Progressing  Intervention: Promote Improved Energy  Recent Flowsheet Documentation  Taken 8/13/2022 1135 by Luis Alberto Green RN  Activity Management: activity adjusted per tolerance  Taken 8/13/2022 0800 by Luis Alberto Green RN  Activity Management: activity adjusted per tolerance   Goal Outcome Evaluation:    Plan of Care Reviewed With: patient      Pt is alert and oriented x2. Pt is med complaint.pt's v/s is stable. Pt had few episode of 3 and 4 second pauses  on tele strip this morning. Md was notified. 12 lead EKG was done.Cardialogist is consulted. Pt is asymptomatic. Continue to monitor pt.

## 2022-08-13 NOTE — PLAN OF CARE
[facility]      OUTPATIENT OCCUPATIONAL THERAPY  EVALUATION  PLAN OF TREATMENT FOR OUTPATIENT REHABILITATION  (COMPLETE FOR INITIAL CLAIMS ONLY)  Patient's Last Name, First Name, M.I.  YOB: 1935  Milligan,George R                          Provider's Name  [facility] Medical Record No.  [id number]                               Onset Date:  08/12/22   Start of Care Date:        Type:     ___PT   _X_OT   ___SLP Medical Diagnosis:                           OT Diagnosis:  decreased ADLs   Visits from SOC:  1   _________________________________________________________________________________  Plan of Treatment/Functional Goals    Planned Interventions: ADL retraining, transfer training, cognition   Goals: See Occupational Therapy Goals on Care Plan in [facility] electronic health record.    Therapy Frequency: Daily  Predicted Duration of Therapy Intervention: 08/18/22  _________________________________________________________________________________    I CERTIFY THE NEED FOR THESE SERVICES FURNISHED UNDER        THIS PLAN OF TREATMENT AND WHILE UNDER MY CARE     (Physician co-signature of this document indicates review and certification of the therapy plan).                 ,      Referring Physician: Dr. Tobin Johnson            Initial Assessment        See Occupational Therapy evaluation dated   in Epic electronic health record.              Goal Outcome Evaluation:

## 2022-08-13 NOTE — PROGRESS NOTES
08/13/22 1031   Quick Adds   Quick Adds Certification   Type of Visit Initial Occupational Therapy Evaluation   Living Environment   People in Home alone   Current Living Arrangements assisted living   Home Accessibility no concerns   Living Environment Comments indep. ADLs, receives A w/ IADLs   Self-Care   Current Activity Tolerance moderate   Equipment Currently Used at Home walker, rolling   Fall history within last six months yes   General Information   Onset of Illness/Injury or Date of Surgery 08/12/22   Referring Physician Dr. Tobin Johnson   Patient/Family Therapy Goal Statement (OT) family would like higher level of care   Existing Precautions/Restrictions fall   Limitations/Impairments safety/cognitive   Cognitive Status Examination   Orientation Status person;place;time   Visual Perception   Visual Impairment/Limitations corrective lenses for reading   Range of Motion Comprehensive   General Range of Motion no range of motion deficits identified   Strength Comprehensive (MMT)   General Manual Muscle Testing (MMT) Assessment no strength deficits identified   Coordination   Upper Extremity Coordination No deficits were identified   Bed Mobility   Comment (Bed Mobility) NT   Transfers   Transfers toilet transfer;sit-stand transfer   Sit-Stand Transfer   Sit-Stand Eddy (Transfers) minimum assist (75% patient effort)   Assistive Device (Sit-Stand Transfers) walker, front-wheeled   Toilet Transfer   Type (Toilet Transfer) sit-stand;stand-sit   Eddy Level (Toilet Transfer) minimum assist (75% patient effort)   Assistive Device (Toilet Transfer) grab bars/safety frame;walker, front-wheeled   Balance   Balance Assessment standing balance: dynamic   Balance Comments fair balance   Grooming Assessment/Training   Eddy Level (Grooming) set up;verbal cues;minimum assist (75% patient effort)   Position (Grooming) supported sitting   Comment, (Grooming) brushed teeth, wiped/washed face    Clinical Impression   Criteria for Skilled Therapeutic Interventions Met (OT) Yes, treatment indicated   OT Diagnosis decreased ADLs   OT Problem List-Impairments impacting ADL activity tolerance impaired;cognition;balance;mobility   Assessment of Occupational Performance 3-5 Performance Deficits   Identified Performance Deficits g/h, trsfs, toileting   Planned Therapy Interventions (OT) ADL retraining;transfer training;cognition   Clinical Decision Making Complexity (OT) moderate complexity   Anticipated Equipment Needs Upon Discharge (OT)   (cont. to assess)   Risk & Benefits of therapy have been explained evaluation/treatment results reviewed;risks/benefits reviewed;care plan/treatment goals reviewed;patient   OT Discharge Planning   OT Discharge Recommendation (DC Rec) Transitional Care Facility;home with assist   OT Rationale for DC Rec if d/c home would need 24 hr assist w/ mobility, 24 hr supervision d/t cognition   Total Evaluation Time (Minutes)   Total Evaluation Time (Minutes) 10   OT Goals   Therapy Frequency (OT) Daily   OT Predicted Duration/Target Date for Goal Attainment 08/18/22   OT Goals Cognition;Transfers;Hygiene/Grooming;Toilet Transfer/Toileting   OT: Hygiene/Grooming supervision/stand-by assist;while standing   OT: Transfer with assistive device  (CGA)   OT: Toilet Transfer/Toileting Minimal assist   OT: Cognitive Patient/caregiver will verbalize understanding of cognitive assessment results/recommendations as needed for safe discharge planning

## 2022-08-13 NOTE — PROGRESS NOTES
"   08/13/22 0800   Quick Adds   Quick Adds Certification   Type of Visit Initial PT Evaluation   Living Environment   People in Home alone   Current Living Arrangements assisted living   Home Accessibility no concerns   Living Environment Comments Pt lives in an [place]. Receives some services but can't recall what.   Self-Care   Usual Activity Tolerance moderate   Current Activity Tolerance fair   Regular Exercise No   Equipment Currently Used at Home walker, rolling   Fall history within last six months yes   Number of times patient has fallen within last six months 12   Activity/Exercise/Self-Care Comment Pt used to work for the [employer].   General Information   Onset of Illness/Injury or Date of Surgery 08/12/22   Referring Physician Dr. Tracie Kellogg.   Patient/Family Therapy Goals Statement (PT) None stated.   Pertinent History of Current Problem (include personal factors and/or comorbidities that impact the POC) From chart: 86 year old male with a history of HTN, CABG x4 in 2016, HTN, CAD who presents from senior living facility after a fall.  Main complaint of generalized weakness and confusion.   Existing Precautions/Restrictions fall   Weight-Bearing Status - LLE full weight-bearing   Weight-Bearing Status - RLE full weight-bearing   Heart Disease Risk Factors Lack of physical activity;Gender;Age   General Observations Male sitting in chair.   Cognition   Affect/Mental Status (Cognition) WFL;confused   Orientation Status (Cognition) oriented to;person;situation;disoriented to;place;time;verbal cues/prompts needed for orientation   Follows Commands (Cognition) WFL   Safety Deficit (Cognition) minimal deficit   Memory Deficit (Cognition) moderate deficit   Cognitive Status Comments Pleasant, cooperative, slightly confused/disoriented.   Pain Assessment   Patient Currently in Pain Yes, see Vital Sign flowsheet  (\"I've got aches and pains.\")   Integumentary/Edema   Integumentary/Edema Comments Not formally " assessed.   Posture    Posture Protracted shoulders   Range of Motion (ROM)   ROM Comment Grossly WFL.   Strength (Manual Muscle Testing)   Strength Comments Grossly WFL or slightly impaired.   Bed Mobility   Comment, (Bed Mobility) Not observed today.   Transfers   Comment, (Transfers) Sit-stand with walker, [place].   Gait/Stairs (Locomotion)   Comment, (Gait/Stairs) Ambulated x 10 ft with walker, CGA. Slightly unsteady, shuffling gait.   Balance   Balance Comments Adequate for ambulation with AD, but warrants further assessment given frequent falls.   Sensory Examination   Sensory Perception patient reports no sensory changes   Coordination   Coordination no deficits were identified   Muscle Tone   Muscle Tone no deficits were identified   Clinical Impression   Criteria for Skilled Therapeutic Intervention Yes, treatment indicated   PT Diagnosis (PT) Impaired functional mobility and activity tolerance, high falls risk.   Influenced by the following impairments Medical, weakness, history of falls, cognition?   Functional limitations due to impairments All functional mobility, activity tolerance, falls risk.   Clinical Presentation (PT Evaluation Complexity) Stable/Uncomplicated   Clinical Presentation Rationale Clinician judgment.   Clinical Decision Making (Complexity) low complexity   Planned Therapy Interventions (PT) balance training;bed mobility training;gait training;home exercise program;patient/family education;strengthening;transfer training   Risk & Benefits of therapy have been explained patient   PT Discharge Planning   PT Discharge Recommendation (DC Rec) Transitional Care Facility;home with assist;home with home care physical therapy   PT Rationale for DC Rec As of day of eval, pt most appropriate for TCU given that he lives alone, has a history of falls (12 in 6 months per pt), safety concerns given cognition, which is not at baseline per family. If goes home, would need 24 hour hands on assist and  supervision, as well as HHPT for further evaluation and progression.   PT Brief overview of current status PT eval completed. Pt is [place] for sit-stand with walker, ambulated ~20 ft in room with walker, CGA, somewhat unsteady.   Therapy Certification   Start of care date 08/13/22   Certification date from 08/13/22   Certification date to 08/20/22   Medical Diagnosis Fall, generalized weakness, confusion.   Total Evaluation Time   Total Evaluation Time (Minutes) 10   Physical Therapy Goals   PT Frequency Daily   PT Predicted Duration/Target Date for Goal Attainment 08/20/22   PT Goals Bed Mobility;Transfers;Gait;PT Goal 1   PT: Bed Mobility Independent;Supine to/from sit   PT: Transfers Modified independent;Sit to/from stand;Assistive device   PT: Gait Modified independent;Assistive device;Greater than 200 feet   PT: Goal 1 Pt to demo 45/56 or 22/30 or greater on the [place] or FGA to demo decreased falls risk.

## 2022-08-13 NOTE — PROGRESS NOTES
[facility]    Medicine Progress Note - Hospitalist Service    Date of Admission:  8/12/2022    Assessment & Plan          86 year old male with a history of HTN, CABG x4 in 2016, HTN, CAD who presents from senior living facility after a fall.  Main complaint of generalized weakness and confusion.       COVID-19 infection    --Not hypoxic.  No plans for remdesivir or steroids     --Lovenox  --Generalized weakness and confusion likely only symptoms and seem to be improving     Generalized weakness and confusion likely secondary to above, seems to be improving              No signs of dehydration or sepsis- WBC normal, UA clean               PT/OT-slums              Patient's family requesting higher level of care-care management for placement              CTA chest abdomen pelvis negative for acute pathology     Acute metabolic encephalopathy, improving?              Likely related to COVID infection              Question underlying dementia?              CT head and cervical spine reviewed with no acute pathology     Fall              Unwitnessed, patient does not remember              CT head and neck reviewed-- contusion noted, degen of C spime              Cardiac telemetry ordered-- normal              ECHO.  Stress reviewed from 2016 with EF of 55%.  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              Check orthostatics-- OK              Knee x-rays and tibia-fibula x-rays with no fractures or effusions.     Penile wound               WOC to see.               Urology consult if needed      Known CAD status post CABG x4 in 2016              Continue aspirin, metoprolol     HTN-continue lisinopril     Update: Had two pauses on telemetry strips this morning, 4 seconds and 3 seconds. EKG shows sinus arrhythmia. Consult cardiology, stopped metoprolol.          Diet: Combination Diet Regular Diet Adult    DVT Prophylaxis: Enoxaparin (Lovenox) SQ  Joseph Catheter: Not present  Central  "Lines: None  Cardiac Monitoring: ACTIVE order. Indication: fall  Code Status: No CPR- Do NOT Intubate      Disposition Plan      Expected Discharge Date: 08/15/2022                The patient's care was discussed with the Bedside Nurse and Patient.    Tracie Kellogg MD  Hospitalist Service  [facility]  Securely message with the Vocera Web Console (learn more here)  Text page via "[employer] Paging/Directory         Clinically Significant Risk Factors Present on Admission                 # Hypertension: home medication list includes antihypertensive(s)    # Overweight: Estimated body mass index is 26.54 kg/m  as calculated from the following:    Height as of this encounter: 1.778 m (5' 10\").    Weight as of this encounter: 83.9 kg (185 lb).        ______________________________________________________________________    Interval History   The patient tells me that he is feeling normal although he knows that he is in the hospital because he is ill.  He denies any shortness of breath or cough or chest pain.  No fevers.  He is oriented to person and place today and is cooperative.  Overnight he had several times where he tried to get out of bed on his own which triggered the bed alarm.  He does have a sitter nearby.  He does understand that he has COVID and he wonders if he should be wearing a mask.    Data reviewed today: I reviewed all medications, new labs and imaging results over the last 24 hours.     Physical Exam   Vital Signs: Temp: 98  F (36.7  C) Temp src: Oral BP: 131/60 Pulse: 68   Resp: 16 SpO2: 98 % O2 Device: None (Room air)    Weight: 185 lbs 0 oz  General: Well developed elderly man, No apparent distress  Cardiovascular: Regular rate and rhythm, Normal S1, S2  Lungs: Clear to auscultation bilaterally  Abdomen: Soft, Non-tender, not distended, Bowel sounds present  Extremities: No edema, no clubbing  Skin: Warm and well-perfused without lesions.  Neurologic: Alert and oriented x2. " Face is symmetric, Moves all extremities equally      Data   Recent Labs   Lab 08/13/22  0634 08/12/22  1135 08/12/22  0938   WBC 7.6  --  7.6   HGB 14.2  --  14.0   MCV 98  --  97     --  303   INR  --   --  1.04    140  --    POTASSIUM 3.7 4.0  --    CHLORIDE 104 107  --    CO2 26 29  --    BUN 17 14  --    CR 0.88 0.81  --    ANIONGAP 9 4*  --    HUNG 9.0 8.7  --    GLC 96 96  --    ALBUMIN 3.4* 3.1*  --    PROTTOTAL 6.8 6.2  --    BILITOTAL 0.4 0.4  --    ALKPHOS 105 97  --    ALT 19 15  --    AST 43* 23  --    LIPASE  --   --  34     Recent Results (from the past 24 hour(s))   Head CT w/o contrast    Narrative    EXAM: CT HEAD W/O CONTRAST, CT CERVICAL SPINE W/O CONTRAST  LOCATION: [facility]  DATE/TIME: 8/12/2022 10:48 AM    INDICATION: Trauma. Unwitnessed fall. Altered mental status. Bruising along the right side of the head. Now unable to walk. Neck stiffness.  COMPARISON: None.  TECHNIQUE:   1) Routine CT Head without IV contrast. Multiplanar reformats. Dose reduction techniques were used.  2) Routine CT Cervical Spine without IV contrast. Multiplanar reformats. Dose reduction techniques were used.    FINDINGS:   HEAD CT:   INTRACRANIAL CONTENTS: No finding for intracranial hemorrhage or mass. Extensive patchy low-attenuation change is seen throughout the cerebral white matter and to a lesser extent basal ganglia and thalami, nonspecific but compatible with areas of gliosis   and/or chronic myelin loss and likely reflecting sequela of chronic microvascular ischemic change given the patient's age. In the absence of priors for comparison small areas of acute on chronic ischemic change cannot be excluded. No transcortical areas   of low attenuation change. No mass effect or midline shift. Moderate prominence of the lateral ventricles and more mild prominence of the third ventricle. Cerebellar tonsils are normally positioned. Sella is unremarkable for technique. Corpus  callosum   is normally formed.    VISUALIZED ORBITS/SINUSES/MASTOIDS: Prior cataract surgery on the left. Left-sided scleral calcification. Orbits are unremarkable. Mild ethmoid sinus mucosal thickening. Middle ear cavities and mastoid air cells are clear.    BONES/SOFT TISSUES: Calvarium is intact, without fracture or suspicious lytic or blastic foci. There is right parietal superficial soft tissue contusion/subgaleal hematoma. No radiopaque foreign body. Coarse atherosclerotic calcifications are seen within   both carotid siphons and intracranial vertebral arteries.    CERVICAL SPINE CT:   VERTEBRA: Very subtle convex-right curvature. Grade 1 approximately 2 mm anterolisthesis of C5 on C6 and C6 on C7. Otherwise normal cervical lordosis. Listhesis at these levels is age indeterminate but favored to be chronic and degenerative in etiology   given appearance of associated facet degenerative changes at these levels and lack of prevertebral soft tissue swelling.    Chronic endplate irregularity and slight height loss central superior C5 endplate. Mild chronic appearing height loss anterior inferior endplate of C6. Vertebral body heights are otherwise preserved. No convincing finding for acute fracture.    Mild to moderate posterior interspace narrowing C4-C5. Craniocervical junction is intact. Mild degenerative change anterior C1-C2 articulation. Mild to moderate multilevel cervical and upper thoracic facet arthropathy.    CANAL/FORAMINA: There is mild spinal canal stenosis at the C4-C5 level exacerbated by thickening/redundancy of the ligamentum flavum. Hypertrophic facet changes contribute to at least mild left foraminal narrowing at C4-C5 and C5-C6.    PARASPINAL: Dorsal paraspinal soft tissues are unremarkable. No prevertebral soft tissue swelling. Coarse atherosclerotic calcifications both carotid bifurcations/bulbs. Visualized lung apices reveal minor peripheral scarring but otherwise clear.      Impression     IMPRESSION:  HEAD CT:  1.  Moderate volume loss and advanced presumed sequela of chronic microvascular ischemic change.    2.  No finding for intracranial hemorrhage.    3.  Superficial soft tissue contusion/subgaleal hematoma seen along the right parietal scalp.    CERVICAL SPINE CT:  1.  Moderate cervical spondylosis with grade 1 anterolisthesis of C5 on C6 and C6 on C7. Although listhesis is age-indeterminate it is favored to be chronic and degenerative in etiology. No prevertebral soft tissue swelling.    2.  No finding for acute fracture.   Cervical spine CT w/o contrast    Narrative    EXAM: CT HEAD W/O CONTRAST, CT CERVICAL SPINE W/O CONTRAST  LOCATION: [facility]  DATE/TIME: 8/12/2022 10:48 AM    INDICATION: Trauma. Unwitnessed fall. Altered mental status. Bruising along the right side of the head. Now unable to walk. Neck stiffness.  COMPARISON: None.  TECHNIQUE:   1) Routine CT Head without IV contrast. Multiplanar reformats. Dose reduction techniques were used.  2) Routine CT Cervical Spine without IV contrast. Multiplanar reformats. Dose reduction techniques were used.    FINDINGS:   HEAD CT:   INTRACRANIAL CONTENTS: No finding for intracranial hemorrhage or mass. Extensive patchy low-attenuation change is seen throughout the cerebral white matter and to a lesser extent basal ganglia and thalami, nonspecific but compatible with areas of gliosis   and/or chronic myelin loss and likely reflecting sequela of chronic microvascular ischemic change given the patient's age. In the absence of priors for comparison small areas of acute on chronic ischemic change cannot be excluded. No transcortical areas   of low attenuation change. No mass effect or midline shift. Moderate prominence of the lateral ventricles and more mild prominence of the third ventricle. Cerebellar tonsils are normally positioned. Sella is unremarkable for technique. Corpus callosum   is normally formed.    VISUALIZED  ORBITS/SINUSES/MASTOIDS: Prior cataract surgery on the left. Left-sided scleral calcification. Orbits are unremarkable. Mild ethmoid sinus mucosal thickening. Middle ear cavities and mastoid air cells are clear.    BONES/SOFT TISSUES: Calvarium is intact, without fracture or suspicious lytic or blastic foci. There is right parietal superficial soft tissue contusion/subgaleal hematoma. No radiopaque foreign body. Coarse atherosclerotic calcifications are seen within   both carotid siphons and intracranial vertebral arteries.    CERVICAL SPINE CT:   VERTEBRA: Very subtle convex-right curvature. Grade 1 approximately 2 mm anterolisthesis of C5 on C6 and C6 on C7. Otherwise normal cervical lordosis. Listhesis at these levels is age indeterminate but favored to be chronic and degenerative in etiology   given appearance of associated facet degenerative changes at these levels and lack of prevertebral soft tissue swelling.    Chronic endplate irregularity and slight height loss central superior C5 endplate. Mild chronic appearing height loss anterior inferior endplate of C6. Vertebral body heights are otherwise preserved. No convincing finding for acute fracture.    Mild to moderate posterior interspace narrowing C4-C5. Craniocervical junction is intact. Mild degenerative change anterior C1-C2 articulation. Mild to moderate multilevel cervical and upper thoracic facet arthropathy.    CANAL/FORAMINA: There is mild spinal canal stenosis at the C4-C5 level exacerbated by thickening/redundancy of the ligamentum flavum. Hypertrophic facet changes contribute to at least mild left foraminal narrowing at C4-C5 and C5-C6.    PARASPINAL: Dorsal paraspinal soft tissues are unremarkable. No prevertebral soft tissue swelling. Coarse atherosclerotic calcifications both carotid bifurcations/bulbs. Visualized lung apices reveal minor peripheral scarring but otherwise clear.      Impression    IMPRESSION:  HEAD CT:  1.  Moderate volume  loss and advanced presumed sequela of chronic microvascular ischemic change.    2.  No finding for intracranial hemorrhage.    3.  Superficial soft tissue contusion/subgaleal hematoma seen along the right parietal scalp.    CERVICAL SPINE CT:  1.  Moderate cervical spondylosis with grade 1 anterolisthesis of C5 on C6 and C6 on C7. Although listhesis is age-indeterminate it is favored to be chronic and degenerative in etiology. No prevertebral soft tissue swelling.    2.  No finding for acute fracture.   Echocardiogram Complete   Result Value    LVEF  55-60%    Narrative    [id number]  [id number]  [id number]  [id number]^JAYE^JOSE ALFREDO^[place] [address]     Name: MILLIGAN, GEORGE R  MRN: [id number]  : 1935  Study Date: 2022 02:34 PM  Age: 86 yrs  Gender: Male  Patient Location: [facility]  Reason For Study: Syncope and Collapse  Ordering Physician: JOSE ALFREDO CEE  Performed By: CM     BSA: 2.0 m2  Height: 70 in  Weight: 185 lb  HR: 80  ______________________________________________________________________________  Procedure  Complete Echo Adult. Definity (NDC #17450[address]867) given intravenously.  ______________________________________________________________________________  Interpretation Summary     Left ventricular size, wall motion and function are normal. The ejection  fraction is 55-60%.  Normal right ventricle size and systolic function.  The left atrium is mildly dilated.  No obvious valvular disease.     No previous study for comparison.     ______________________________________________________________________________  I      WMSI = 1.00     % Normal = 100     X - Cannot   0 -                      (2) - Mildly 2 -          Segments  Size  Interpret    Hyperkinetic 1 - Normal  Hypokinetic  Hypokinetic  1-2     small                                                     7 -          3-5      moderate  3 - Akinetic 4 -          5 -         6 -  Akinetic Dyskinetic   6-14    large               Dyskinetic   Aneurysmal  w/scar       w/scar       15-16   diffuse     Left Ventricle  Left ventricular size, wall motion and function are normal. The ejection  fraction is 55-60%. There is normal left ventricular wall thickness. Grade I  or early diastolic dysfunction. Normal left ventricular wall motion.     Right Ventricle  Normal right ventricle size and systolic function.     Atria  The left atrium is mildly dilated. Right atrial size is normal. There is no  atrial shunt seen.     Mitral Valve  The mitral valve leaflets appear thickened, but open well. The mitral valve  chordae are thickened and/or calcified. There is mild (1+) mitral  regurgitation. There is no mitral valve stenosis.     Tricuspid Valve  Tricuspid leaflets are thickened. There is trace to mild tricuspid  regurgitation. There is no tricuspid stenosis.     Aortic Valve  Aortic valve leaflets appear normal. There is no evidence of aortic stenosis  or clinically significant aortic regurgitation.     Pulmonic Valve  The pulmonic valve is not well seen, but is grossly normal.     Vessels  The aorta root is normal. IVC diameter <2.1 cm collapsing >50% with sniff  suggests a normal RA pressure of 3 mmHg.     Pericardium  There is no pericardial effusion.     Rhythm  The rhythm was atrial fibrillation.     ______________________________________________________________________________  MMode/2D Measurements & Calculations  IVSd: 1.1 cm  LVIDd: 4.1 cm  LVIDs: 2.4 cm  LVPWd: 0.90 cm     FS: 42.1 %  LV mass(C)d: 135.3 grams  LV mass(C)dI: 67.0 grams/m2  Ao root diam: 3.3 cm  LA dimension: 3.1 cm  LA/Ao: 0.93  LVOT diam: 2.4 cm  LVOT area: 4.5 cm2  LA Volume Indexed (AL/bp): 27.7 ml/m2  RWT: 0.44     Doppler Measurements & Calculations  MV E max svetlana: 70.4 cm/sec  MV A max svetlana: 108.9 cm/sec  MV E/A: 0.65  MV max P.3 mmHg  MV mean P.9 mmHg  MV V2 VTI: 24.3 cm  MVA(VTI): 3.6 cm2     MV dec slope: 328.0  cm/sec2  MV dec time: 0.22 sec  Ao V2 max: 124.9 cm/sec  Ao max P.0 mmHg  Ao V2 mean: 79.1 cm/sec  Ao mean PG: 3.1 mmHg  Ao V2 VTI: 21.9 cm  JUSTA(I,D): 3.9 cm2  JUSTA(V,D): 3.7 cm2  LV V1 max P.2 mmHg  LV V1 max: 102.8 cm/sec  LV V1 VTI: 19.0 cm  SV(LVOT): 86.6 ml  SI(LVOT): 42.9 ml/m2  PA acc time: 0.10 sec  AV Joon Ratio (DI): 0.82  JUSTA Index (cm2/m2): 2.0  E/E' av.2  Lateral E/e': 7.0  Medial E/e': 11.4     ______________________________________________________________________________  Report approved by: Kim Vallejo 2022 03:19 PM         CTA Chest Abdomen Pelvis w Contrast    Narrative    EXAM: CTA CHEST ABDOMEN PELVIS W CONTRAST  LOCATION: [facility]  DATE/TIME: 2022 3:50 PM    INDICATION: Trauma syncope fall anticoagulated  COMPARISON: None.  TECHNIQUE: CT angiogram chest abdomen pelvis during arterial phase of injection of IV contrast. 2D and 3D MIP reconstructions were performed by the CT technologist. Dose reduction techniques were used.   CONTRAST: isovue 370 75ml    FINDINGS:   CT ANGIOGRAM CHEST, ABDOMEN, AND PELVIS: Great vessels off of the aortic arch are widely patent with normal configuration. Thoracic aorta is of normal size and appearance without any evidence of dissection or arterial wall injury. No evidence of   aneurysm. No active bleed is seen within the chest. The abdominal aorta is widely patent and of normal size. No evidence of dissection or aneurysm. Celiac artery and SMA are patent. Bilateral renal arteries are patent. ROBBIE is patent. Bilateral common   iliac and external iliac arteries are patent and of normal size and appearance. Bilateral common femoral arteries are patent. No evidence of active bleed within the abdomen or pelvis.    LUNGS AND PLEURA: No consolidation, pleural effusion or pneumothorax.    MEDIASTINUM/AXILLAE: No mediastinal adenopathy. Normal heart size. No pericardial effusion. No mediastinal hematoma or  hemorrhage.    CORONARY ARTERY CALCIFICATION: Moderate.    HEPATOBILIARY: Normal.    PANCREAS: Normal.    SPLEEN: Normal.    ADRENAL GLANDS: Normal.    KIDNEYS/BLADDER: Renal cysts. No hydronephrosis.    BOWEL: Normal.    LYMPH NODES: Normal.    PELVIC ORGANS: Normal.    MUSCULOSKELETAL: No acute fracture or destructive osseous lesions. Degenerative changes of the spine. Previous bilateral total hip arthroplasty.      Impression    IMPRESSION:  1.  Thoracoabdominal aorta is patent and of normal size and appearance. No evidence of dissection or injury. No active bleed in the chest, abdomen or pelvis.     XR Knee Left 1/2 Views    Narrative    EXAM: XR KNEE LEFT 1/2 VIEWS  LOCATION: [facility]  DATE/TIME: 8/12/2022 4:07 PM    INDICATION: Pain following injury.  COMPARISON: None.      Impression    IMPRESSION: No fractures are evident. No knee joint effusion. Normal joint spacing. Surgical clips in the soft tissues.   XR Tibia and Fibula Left 2 Views    Narrative    EXAM: XR TIBIA AND FIBULA LEFT 2 VIEWS  LOCATION: [facility]  DATE/TIME: 8/12/2022 4:07 PM    INDICATION: Pain following injury.  COMPARISON: None.      Impression    IMPRESSION: No fractures are identified. Surgical clips in the medial soft tissues. Atheromatous calcification.

## 2022-08-13 NOTE — PLAN OF CARE
PRIMARY DIAGNOSIS: ACUTE PAIN  OUTPATIENT/OBSERVATION GOALS TO BE MET BEFORE DISCHARGE:  1. Pain Status: Pain free.    2. Return to near baseline physical activity: Yes    3. Cleared for discharge by consultants (if involved): No    Discharge Planner Nurse   Safe discharge environment identified: Yes  Barriers to discharge: Yes       Entered by: Luis Alberto Green RN 08/13/2022 3:03 PM     Please review provider order for any additional goals.   Nurse to notify provider when observation goals have been met and patient is ready for discharge.Goal Outcome Evaluation:    Plan of Care Reviewed With: patient

## 2022-08-13 NOTE — CONSULTS
"  Thank you, DrJessica No ref. provider found, for asking the [facility] team to see Mr. George R Milligan to evaluate       Assessment/Recommendations   Assessment/Plan:  1. 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  No signs endocarditis, normal electrolytes, will track on monitor.     2. CAD s/p CABG - retry rosuvastatin 5mg (not on list) and cont aspirin          History of Present Illness/Subjective    Mr. George R Milligan is a 86 year old male with hx of CABG, hyperlipidemia here with covid infection and had pause.  Pt denies chest pain/pressure, syncopal events, dyspena, pND/orthonpea, edema, GI/ bleeding, but staff noted he is confused. He was on metoprolol succinate 25mg which was stopped this AM.  CABG in 2016 LIMA to LAD, SVG to diagonal, sVG to OM and SVG to PDA by Dr. Castro at [facility],. Seen last by Vonda Garcia at [facility] in 2016.     Telem - [place] prolonged then several p waves without conduction  ECG 12 lead no evidence ischemia  Cr 0.88, normal CBC,   Echo yesterday nroaml EF, no valve abn       Physical Examination Review of Systems   /69 (BP Location: Right arm)   Pulse 63   Temp 97.9  F (36.6  C) (Oral)   Resp 18   Ht 1.778 m (5' 10\")   Wt 83.9 kg (185 lb)   SpO2 93%   BMI 26.54 kg/m    Body mass index is 26.54 kg/m .  Wt Readings from Last 3 Encounters:   08/12/22 83.9 kg (185 lb)   06/17/22 83.9 kg (185 lb)   06/10/16 79.8 kg (176 lb)       Intake/Output Summary (Last 24 hours) at 8/13/2022 1352  Last data filed at 8/13/2022 1200  Gross per 24 hour   Intake 790 ml   Output 300 ml   Net 490 ml     General Appearance:   no distress, normal body habitus   ENT/Mouth: membranes moist, no oral lesions or bleeding gums.      EYES:  no scleral icterus, normal conjunctivae   Neck: no carotid bruits or thyromegaly   Chest/Lungs:   lungs are clear to " auscultation, no rales or wheezing,  sternal scar, equal chest wall expansion    Cardiovascular:   Regular. Normal first and second heart sounds with no murmurs, rubs, or gallops; the carotid, radial and posterior tibial pulses are intact, Jugular venous pressure , edema bilaterally    Abdomen:  no organomegaly, masses, bruits, or tenderness; bowel sounds are present   Extremities: no cyanosis or clubbing   Skin: no xanthelasma, warm.    Neurologic: normal  bilateral, no tremors     Psychiatric: alert and oriented x3, calm     Review of Systems - 12 points nega other than above      Medical History  Surgical History Family History Social History   History reviewed. No pertinent past medical history. History reviewed. No pertinent surgical history. No family history on file. Social History     Socioeconomic History     Marital status: Single     Spouse name: Not on file     Number of children: Not on file     Years of education: Not on file     Highest education level: Not on file   Occupational History     Not on file   Tobacco Use     Smoking status: Not on file     Smokeless tobacco: Not on file   Substance and Sexual Activity     Alcohol use: Not on file     Drug use: Not on file     Sexual activity: Not on file   Other Topics Concern     Not on file   Social History Narrative     Not on file     Social Determinants of Health     Financial Resource Strain: Not on file   Food Insecurity: Not on file   Transportation Needs: Not on file   Physical Activity: Not on file   Stress: Not on file   Social Connections: Not on file   Intimate Partner Violence: Not on file   Housing Stability: Not on file          Medications  Allergies   Scheduled Meds:    aspirin  81 mg Oral QPM     enoxaparin ANTICOAGULANT  40 mg Subcutaneous Q24H     lisinopril  20 mg Oral QPM     sodium chloride (PF)  3 mL Intracatheter Q8H     Vitamin D3  1,000 Units Oral QPM     Continuous Infusions:  PRN Meds:.acetaminophen **OR** acetaminophen,  lidocaine 4%, lidocaine (buffered or not buffered), melatonin, ondansetron **OR** ondansetron, senna-docusate **OR** senna-docusate, sodium chloride (PF) Allergies   Allergen Reactions     Atorvastatin Muscle Pain (Myalgia)     Lopid [Gemfibrozil] Muscle Pain (Myalgia)     Pravachol [Pravastatin] Unknown         Lab Results    Chemistry/lipid CBC Cardiac Enzymes/BNP/TSH/INR   Lab Results   Component Value Date    BUN 17 08/13/2022     08/13/2022    CO2 26 08/13/2022    Lab Results   Component Value Date    WBC 7.6 08/13/2022    HGB 14.2 08/13/2022    HCT 43.6 08/13/2022    MCV 98 08/13/2022     08/13/2022    Lab Results   Component Value Date    TROPONINI 0.03 08/12/2022     (H) 08/12/2022    INR 1.04 08/12/2022              Gagan Castellanos MD  Interventional Cardiology  [facility]

## 2022-08-14 ENCOUNTER — APPOINTMENT (OUTPATIENT)
Dept: OCCUPATIONAL THERAPY | Facility: HOSPITAL | Age: 87
DRG: 177 | End: 2022-08-14
Payer: MEDICARE

## 2022-08-14 PROBLEM — I44.1 ATRIOVENTRICULAR BLOCK, MOBITZ TYPE 1, WENCKEBACH: Status: ACTIVE | Noted: 2022-08-14

## 2022-08-14 PROBLEM — G93.41 METABOLIC ENCEPHALOPATHY: Status: ACTIVE | Noted: 2022-08-14

## 2022-08-14 PROBLEM — U07.1 INFECTION DUE TO 2019 NOVEL CORONAVIRUS: Status: ACTIVE | Noted: 2022-08-14

## 2022-08-14 LAB
ANION GAP SERPL CALCULATED.3IONS-SCNC: 8 MMOL/L (ref 5–18)
ATRIAL RATE - MUSE: 65 BPM
BUN SERPL-MCNC: 19 MG/DL (ref 8–28)
CALCIUM SERPL-MCNC: 8.3 MG/DL (ref 8.5–10.5)
CHLORIDE BLD-SCNC: 110 MMOL/L (ref 98–107)
CO2 SERPL-SCNC: 24 MMOL/L (ref 22–31)
CREAT SERPL-MCNC: 0.78 MG/DL (ref 0.7–1.3)
DIASTOLIC BLOOD PRESSURE - MUSE: NORMAL MMHG
ERYTHROCYTE [DISTWIDTH] IN BLOOD BY AUTOMATED COUNT: 12.8 % (ref 10–15)
GFR SERPL CREATININE-BSD FRML MDRD: 87 ML/MIN/1.73M2
GLUCOSE BLD-MCNC: 94 MG/DL (ref 70–125)
HCT VFR BLD AUTO: 39.6 % (ref 40–53)
HGB BLD-MCNC: 13.1 G/DL (ref 13.3–17.7)
INTERPRETATION ECG - MUSE: NORMAL
MCH RBC QN AUTO: 31.6 PG (ref 26.5–33)
MCHC RBC AUTO-ENTMCNC: 33.1 G/DL (ref 31.5–36.5)
MCV RBC AUTO: 96 FL (ref 78–100)
P AXIS - MUSE: 43 DEGREES
PLATELET # BLD AUTO: 259 10E3/UL (ref 150–450)
POTASSIUM BLD-SCNC: 3.5 MMOL/L (ref 3.5–5)
PR INTERVAL - MUSE: 206 MS
QRS DURATION - MUSE: 84 MS
QT - MUSE: 440 MS
QTC - MUSE: 457 MS
R AXIS - MUSE: 8 DEGREES
RBC # BLD AUTO: 4.14 10E6/UL (ref 4.4–5.9)
SODIUM SERPL-SCNC: 142 MMOL/L (ref 136–145)
SYSTOLIC BLOOD PRESSURE - MUSE: NORMAL MMHG
T AXIS - MUSE: 24 DEGREES
VENTRICULAR RATE- MUSE: 65 BPM
WBC # BLD AUTO: 5.5 10E3/UL (ref 4–11)

## 2022-08-14 PROCEDURE — 85027 COMPLETE CBC AUTOMATED: CPT | Performed by: FAMILY MEDICINE

## 2022-08-14 PROCEDURE — 97535 SELF CARE MNGMENT TRAINING: CPT | Mod: GO

## 2022-08-14 PROCEDURE — 80048 BASIC METABOLIC PNL TOTAL CA: CPT | Performed by: FAMILY MEDICINE

## 2022-08-14 PROCEDURE — 250N000011 HC RX IP 250 OP 636: Performed by: INTERNAL MEDICINE

## 2022-08-14 PROCEDURE — 96372 THER/PROPH/DIAG INJ SC/IM: CPT | Performed by: INTERNAL MEDICINE

## 2022-08-14 PROCEDURE — 250N000013 HC RX MED GY IP 250 OP 250 PS 637: Performed by: INTERNAL MEDICINE

## 2022-08-14 PROCEDURE — 99232 SBSQ HOSP IP/OBS MODERATE 35: CPT | Mod: CS | Performed by: FAMILY MEDICINE

## 2022-08-14 PROCEDURE — 99213 OFFICE O/P EST LOW 20 MIN: CPT | Performed by: INTERNAL MEDICINE

## 2022-08-14 PROCEDURE — 36415 COLL VENOUS BLD VENIPUNCTURE: CPT | Performed by: FAMILY MEDICINE

## 2022-08-14 PROCEDURE — G0378 HOSPITAL OBSERVATION PER HR: HCPCS

## 2022-08-14 RX ADMIN — LISINOPRIL 20 MG: 20 TABLET ORAL at 20:36

## 2022-08-14 RX ADMIN — ROSUVASTATIN CALCIUM 5 MG: 5 TABLET, FILM COATED ORAL at 20:35

## 2022-08-14 RX ADMIN — ASPIRIN 81 MG CHEWABLE TABLET 81 MG: 81 TABLET CHEWABLE at 20:35

## 2022-08-14 RX ADMIN — Medication 1000 UNITS: at 20:40

## 2022-08-14 RX ADMIN — ENOXAPARIN SODIUM 40 MG: 40 INJECTION SUBCUTANEOUS at 14:10

## 2022-08-14 ASSESSMENT — ACTIVITIES OF DAILY LIVING (ADL)
ADLS_ACUITY_SCORE: 43
ADLS_ACUITY_SCORE: 39
ADLS_ACUITY_SCORE: 43
ADLS_ACUITY_SCORE: 39
ADLS_ACUITY_SCORE: 43
ADLS_ACUITY_SCORE: 39
ADLS_ACUITY_SCORE: 39
ADLS_ACUITY_SCORE: 43
ADLS_ACUITY_SCORE: 43
ADLS_ACUITY_SCORE: 39

## 2022-08-14 NOTE — SUMMARY OF CARE
Pt still requiring 1:1 sitter due to impulsiveness. Pt oriented to self. Scheduled pain meds controlled pain. Male purewick in place. Added foam dressing to coccyx. Frequent redirections needed.    [id number] Critical Care - 30 to 74 minutes

## 2022-08-14 NOTE — PLAN OF CARE
PRIMARY DIAGNOSIS: GENERALIZED WEAKNESS    OUTPATIENT/OBSERVATION GOALS TO BE MET BEFORE DISCHARGE  1. Orthostatic performed: N/A    2. Tolerating PO medications: Yes    3. Return to near baseline physical activity: Yes    4. Cleared for discharge by consultants (if involved): No    Discharge Planner Nurse   Safe discharge environment identified: No  Barriers to discharge: Yes       Entered by: Elisa Yadav RN 08/14/2022 2:27 PM     Please review provider order for any additional goals.   Nurse to notify provider when observation goals have been met and patient is ready for discharge.Goal Outcome Evaluation:    Plan of Care Reviewed With: patient     Overall Patient Progress: improving    Outcome Evaluation: Resolution of confusion. VSS. No changes on telemetry

## 2022-08-14 NOTE — UTILIZATION REVIEW
Concurrent stay review; Secondary Review Determination       Under the authority of the Utilization Management Committee, the utilization review process indicated a secondary review on the above patient.  The review outcome is based on review of the medical records, discussions with staff, and applying clinical experience noted on the date of the review.          (x) Observation Status Appropriate - Concurrent stay review    RATIONALE FOR DETERMINATION     Mr. Milligan is a 85 yo male who presents to the ED after sustaining a mechanical fall.  No fractures or acute CVA noted on imaging on admission. He also complained of weakness and some confusion; noted to be COVID + but is not hypoxic or receiving steroids or remdesivir.  His mentation is clearly.  He is remaining in the hospital today for TCU placement.     Patient is clinically improving and there is no clear indication to change patient's status to inpatient. The severity of illness, intensity of service provided, expected LOS and risk for adverse outcome make the care appropriate for observation.      The information on this document is developed by the utilization review team in order for the business office to ensure compliance.  This only denotes the appropriateness of proper admission status and does not reflect the quality of care rendered.         The definitions of Inpatient Status and Observation Status used in making the determination above are those provided in the CMS Coverage Manual, Chapter 1 and Chapter 6, section 70.4.          Sincerely,       Layla Delgado, DO  Utilization Review  Physician Advisor  [facility].

## 2022-08-14 NOTE — PROGRESS NOTES
[facility]    Medicine Progress Note - Hospitalist Service    Date of Admission:  8/12/2022    Assessment & Plan          86 year old male with a history of HTN, CABG x4 in 2016, HTN, CAD who presents from senior living facility after a fall.  Main complaint of generalized weakness and confusion.       COVID-19 infection  --Not hypoxic.  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  Likely related to COVID infection  Question underlying dementia?   CT head and cervical spine reviewed with no acute pathology  -- Overall improving, needs to be off 1:1 for TCU     Fall  Unwitnessed, patient does not remember-- related to heart block??   CT head and neck reviewed-- contusion noted, degen of C spime   ECHO.  Stress reviewed from 2016 with EF of 55%.  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  Check orthostatics-- OK   Knee x-rays and tibia-fibula x-rays with no fractures or effusions.     Penile wound    --WOC to see.      Known CAD status post CABG x4 in 2016    Continue aspirin, metoprolol     HTN-continue lisinopril          Diet: Combination Diet Regular Diet Adult    DVT Prophylaxis: Enoxaparin (Lovenox) SQ  Joseph Catheter: Not present  Central Lines: None  Cardiac Monitoring: ACTIVE order. Indication: fall  Code Status: No CPR- Do NOT Intubate      Disposition Plan  "    Expected Discharge Date: 08/15/2022                The patient's care was discussed with the Bedside Nurse and Patient.    Tracie Kellogg MD  Hospitalist Service  [facility]  Securely message with the Vocera Web Console (learn more here)  Text page via "[employer] Paging/Directory         Clinically Significant Risk Factors Present on Admission          # Hypocalcemia: Ca = 8.3 mg/dL (Ref range: 8.5 - 10.5 mg/dL) and/or iCa = N/A on admission, will replace as needed        # Hypertension: home medication list includes antihypertensive(s)    # Overweight: Estimated body mass index is 26.54 kg/m  as calculated from the following:    Height as of this encounter: 1.778 m (5' 10\").    Weight as of this encounter: 83.9 kg (185 lb).        ______________________________________________________________________    Interval History   He tells me he is feeling fine. He is oriented to person and place. Not yet to time (thinks it is close to supper time). He is less impulsive per nursing and using call light to get help.    Data reviewed today: I reviewed all medications, new labs and imaging results over the last 24 hours.     Physical Exam   Vital Signs: Temp: 98  F (36.7  C) Temp src: Oral BP: 121/58 Pulse: 71   Resp: 16 SpO2: 97 % O2 Device: None (Room air)    Weight: 185 lbs 0 oz  General: Well developed elderly man, No apparent distress  Cardiovascular: Regular rate and rhythm, Normal S1, S2  Lungs: Clear to auscultation bilaterally  Abdomen: Soft, Non-tender, not distended, Bowel sounds present  Extremities: No edema, no clubbing  Skin: Warm and well-perfused without lesions.  Neurologic: Alert and oriented x2. Face is symmetric, Moves all extremities equally      Data   Recent Labs   Lab 08/14/22  0608 08/13/22  0634 08/12/22  1135 08/12/22  0938   WBC 5.5 7.6  --  7.6   HGB 13.1* 14.2  --  14.0   MCV 96 98  --  97    293  --  303   INR  --   --   --  1.04    139 140  --  "   POTASSIUM 3.5 3.7 4.0  --    CHLORIDE 110* 104 107  --    CO2 24 26 29  --    BUN 19 17 14  --    CR 0.78 0.88 0.81  --    ANIONGAP 8 9 4*  --    HUNG 8.3* 9.0 8.7  --    GLC 94 96 96  --    ALBUMIN  --  3.4* 3.1*  --    PROTTOTAL  --  6.8 6.2  --    BILITOTAL  --  0.4 0.4  --    ALKPHOS  --  105 97  --    ALT  --  19 15  --    AST  --  43* 23  --    LIPASE  --   --   --  34     No results found for this or any previous visit (from the past 24 hour(s)).

## 2022-08-14 NOTE — PROGRESS NOTES
Care Management Follow Up    Length of Stay (days): 0    Expected Discharge Date: 08/15/2022     Concerns to be Addressed: discharge planning      Patient plan of care discussed at interdisciplinary rounds: Yes    Anticipated Discharge Disposition:  transitional care (currently needing a TCU that accepts Covid+ pts)     Anticipated Discharge Services:  transitional care  Anticipated Discharge DME:  per treatment team    Patient/family educated on Medicare website which has current facility and service quality ratings: yes   Education Provided on the Discharge Plan:  yes  Patient/Family in Agreement with the Plan:  yes    Referrals Placed by CM/SW:  transitional care  Private pay costs discussed: not at this time    Additional Information:  8:56 AM  Chart reviewed. Pt lives in an assisted. Therapy is recommending TCU for Pt. Family is agreeable. Pt is positive for Covid and referrals have been sent to TCUs known to accept Covid+ pts. Pt's Covid status and current 1:1 status provides multiple barriers to finding placement for Pt. TCUs mostly do not accept or review referrals for pts on 1:1s until they have been off a 1:1 for at least 24 hours. CM to continue to follow up with discharge planning.    DAVINA Felton

## 2022-08-14 NOTE — PROGRESS NOTES
"  No change, no chest pain/pressure dizzy spells    Current Facility-Administered Medications   Medication     acetaminophen (TYLENOL) tablet 650 mg    Or     acetaminophen (TYLENOL) Suppository 650 mg     aspirin (ASA) chewable tablet 81 mg     enoxaparin ANTICOAGULANT (LOVENOX) injection 40 mg     lidocaine (LMX4) cream     lidocaine 1 % 0.1-1 mL     lisinopril (ZESTRIL) tablet 20 mg     melatonin tablet 1 mg     ondansetron (ZOFRAN ODT) ODT tab 4 mg    Or     ondansetron (ZOFRAN) injection 4 mg     rosuvastatin (CRESTOR) tablet 5 mg     senna-docusate (SENOKOT-S/PERICOLACE) 8.6-50 MG per tablet 1 tablet    Or     senna-docusate (SENOKOT-S/PERICOLACE) 8.6-50 MG per tablet 2 tablet     sodium chloride (PF) 0.9% PF flush 3 mL     sodium chloride (PF) 0.9% PF flush 3 mL     Vitamin D3 (CHOLECALCIFEROL) tablet 1,000 Units     History reviewed. No pertinent past medical history.     Review of Systems: 12 points negative other than above    /59 (BP Location: Right arm)   Pulse 65   Temp 98  F (36.7  C) (Oral)   Resp 16   Ht 1.778 m (5' 10\")   Wt 83.9 kg (185 lb)   SpO2 93%   BMI 26.54 kg/m    JVP<7cm, carotids normal  Lungs clear  Cor RRR no c,r,m  Abs soft +BS, no mass  Ext no c,c,e    Lab Results   Component Value Date    HGB 13.1 (L) 08/14/2022     Lab Results   Component Value Date     08/14/2022     No results found for: CREATININE  No components found for: K    Imp/plan:  1. 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 CAD s/p CABG - retry rosuvastatin 5mg (not on list) and cont aspirin     Followed at [facility] or with our office, whichever is more convenient.  Will sign off    Gagan Castellanos MD  Interventional Cardiology   [facility]  [phone]    "

## 2022-08-14 NOTE — PROGRESS NOTES
Patient no longer 1:1 sitter. He is alert and oriented to person & place, fluctuates. Alarms in place, call light is within reach.

## 2022-08-15 ENCOUNTER — APPOINTMENT (OUTPATIENT)
Dept: PHYSICAL THERAPY | Facility: HOSPITAL | Age: 87
DRG: 177 | End: 2022-08-15
Payer: MEDICARE

## 2022-08-15 ENCOUNTER — APPOINTMENT (OUTPATIENT)
Dept: OCCUPATIONAL THERAPY | Facility: HOSPITAL | Age: 87
DRG: 177 | End: 2022-08-15
Payer: MEDICARE

## 2022-08-15 LAB
CREAT SERPL-MCNC: 0.75 MG/DL (ref 0.7–1.3)
GFR SERPL CREATININE-BSD FRML MDRD: 88 ML/MIN/1.73M2
PLATELET # BLD AUTO: 256 10E3/UL (ref 150–450)

## 2022-08-15 PROCEDURE — 250N000013 HC RX MED GY IP 250 OP 250 PS 637: Performed by: INTERNAL MEDICINE

## 2022-08-15 PROCEDURE — G0378 HOSPITAL OBSERVATION PER HR: HCPCS

## 2022-08-15 PROCEDURE — 96372 THER/PROPH/DIAG INJ SC/IM: CPT | Performed by: INTERNAL MEDICINE

## 2022-08-15 PROCEDURE — 250N000011 HC RX IP 250 OP 636: Performed by: INTERNAL MEDICINE

## 2022-08-15 PROCEDURE — 97116 GAIT TRAINING THERAPY: CPT | Mod: GP

## 2022-08-15 PROCEDURE — 99232 SBSQ HOSP IP/OBS MODERATE 35: CPT | Mod: CS | Performed by: INTERNAL MEDICINE

## 2022-08-15 PROCEDURE — 36415 COLL VENOUS BLD VENIPUNCTURE: CPT | Performed by: INTERNAL MEDICINE

## 2022-08-15 PROCEDURE — 97110 THERAPEUTIC EXERCISES: CPT | Mod: GO

## 2022-08-15 PROCEDURE — 85049 AUTOMATED PLATELET COUNT: CPT | Performed by: INTERNAL MEDICINE

## 2022-08-15 PROCEDURE — G0463 HOSPITAL OUTPT CLINIC VISIT: HCPCS

## 2022-08-15 PROCEDURE — 82565 ASSAY OF CREATININE: CPT | Performed by: INTERNAL MEDICINE

## 2022-08-15 RX ADMIN — ANORECTAL OINTMENT: 15.7; .44; 24; 20.6 OINTMENT TOPICAL at 21:45

## 2022-08-15 RX ADMIN — Medication 1000 UNITS: at 21:45

## 2022-08-15 RX ADMIN — ENOXAPARIN SODIUM 40 MG: 40 INJECTION SUBCUTANEOUS at 17:40

## 2022-08-15 RX ADMIN — ROSUVASTATIN CALCIUM 5 MG: 5 TABLET, FILM COATED ORAL at 21:45

## 2022-08-15 RX ADMIN — ASPIRIN 81 MG CHEWABLE TABLET 81 MG: 81 TABLET CHEWABLE at 21:45

## 2022-08-15 RX ADMIN — LISINOPRIL 20 MG: 20 TABLET ORAL at 21:45

## 2022-08-15 ASSESSMENT — ACTIVITIES OF DAILY LIVING (ADL)
ADLS_ACUITY_SCORE: 43
ADLS_ACUITY_SCORE: 39
ADLS_ACUITY_SCORE: 43
ADLS_ACUITY_SCORE: 43
ADLS_ACUITY_SCORE: 39

## 2022-08-15 NOTE — CONSULTS
[facility]  WO Nurse Inpatient Assessment     Consulted for: penis    Patient History (according to provider note(s):       Patient is a 86 year old male with history significant for HTN, CABG x4 in 2016, HTN, CAD who presents from senior living facility after a fall.  Main complaint of generalized weakness and confusion.  Patient presents here from a senior living facility after an unwitnessed fall.  Apparently patient had family visiting last night for dinner and sometime overnight fell on the floor.  He was found the next morning.  It is unclear when he fell and how long he was down.  Patient does not remember falling.  Complains of some chronic lower extremity pain but no new pains.  Denies any current chest pain, shortness of breath.  Denies any fevers or chills.  Denies diarrhea or constipation or dysuria.  Niece at bedside.  She notes patient has been complaining of generalized weakness and has been a little bit more confused the last few days.  Family is requesting higher level of care once patient is discharged.       Areas Assessed:      Areas visualized during today's visit: Focused:    Penis     Skin Injury Location:          Last photo: today at initial assessment   Skin injury due to: irritation  Skin history - not avaialble   and plan of care:   Rx ointment Calmoseptin   Affected area:      Skin assessment: Erythema     Measurements (length x width x depth, in cm)  See photo     Color: pink     Temperature  normal      Drainage: none, not observed     Color: none      Odor: none  Pain: no complains  Treatment goal: Heal   STATUS: initial assessment  Supplies ordered: will be Rx by MD         Treatment Plan:     Cleanse area, apply Calmoseptine Rx ointment , twice per day     Orders: Reviewed    RECOMMEND PRIMARY TEAM ORDER: None, at this time  Education provided: Infection prevention   Discussed plan of care with: Patient and Nurse  WO nurse follow-up plan: weekly  Notify  WOC if wound(s) deteriorate.  Nursing to notify the Provider(s) and re-consult the WOC Nurse if new skin concern.    DATA:     Current support surface: Standard  Foam mattress  Containment of urine/stool: male cath in place  BMI: Body mass index is 26.54 kg/m .   Active diet order: Orders Placed This Encounter      Combination Diet Regular Diet Adult     Output: I/O last 3 completed shifts:  In: 960 [P.O.:960]  Out: 1100 [Urine:1100]     Labs: Recent Labs   Lab 08/14/22  0608 08/13/22  0634 08/12/22  1135 08/12/22  0938   ALBUMIN  --  3.4*   < >  --    HGB 13.1* 14.2  --  14.0   INR  --   --   --  1.04   WBC 5.5 7.6  --  7.6    < > = values in this interval not displayed.     Pressure injury risk assessment:   Sensory Perception: 3-->slightly limited  Moisture: 3-->occasionally moist  Activity: 2-->chairfast  Mobility: 3-->slightly limited  Nutrition: 3-->adequate  Friction and Shear: 2-->potential problem  Rich Score: 16    Haily Barnett RN CWON

## 2022-08-15 NOTE — PROGRESS NOTES
[facility]    PROGRESS NOTE - Hospitalist Service    Assessment and Plan  Patient is new to me, Isai is a 86 year old male with a past medical history of HTN, CABG x4 in 2016, HTN, CAD who presents from senior living facility after a fall and admitted with generalized weakness and confusion secondary to COVID.       COVID-19 infection  -- Asymptomatic, not hypoxic.   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   - CT head and neck negative for significant acute changes  - ECHO is unremarkable.  Stress reviewed from 2016 with EF of 55%.   Check orthostatics-- OK   Knee x-rays and tibia-fibula x-rays with no fractures or effusions.  - Patient needs placement     Penile wound    - WOC to see.  -Start Calmoseptine ointment     Known CAD status post CABG x4 in 2016   -Stable.   -  Continue aspirin, metoprolol     HTN  -Stable.    - Continue lisinopril    Active Problems:    Generalized muscle weakness    Fall, initial encounter    Atrioventricular  block, Mobitz type 1, Wenckebach    Infection due to 2019 novel coronavirus    Metabolic encephalopathy      VTE prophylaxis:  Enoxaparin (Lovenox) SQ  DIET: Orders Placed This Encounter      Combination Diet Regular Diet Adult      Disposition/Barriers to discharge: TCU placement  Code Status: No CPR- Do NOT Intubate    Subjective:  Isai is feeling about the same today, pleasant confused.  Denies any chest pain or shortness of breath.    PHYSICAL EXAM  Vitals:    08/12/22 0925 08/12/22 1148   Weight: 83.9 kg (185 lb) 83.9 kg (185 lb)     B/P:111/56 T:98 P:94 R:18     Intake/Output Summary (Last 24 hours) at 8/15/2022 1629  Last data filed at 8/15/2022 0622  Gross per 24 hour   Intake --   Output 900 ml   Net -900 ml      Body mass index is 26.54 kg/m .    Constitutional: awake, alert, cooperative, no apparent distress, and appears stated age  Respiratory: No increased work of breathing, good air exchange, clear to auscultation bilaterally, no crackles or wheezing  Cardiovascular: Normal apical impulse, regular rate and rhythm, normal S1 and S2, no S3 or S4, and no murmur noted  GI: No scars, normal bowel sounds, soft, non-distended, non-tender, no masses palpated, no hepatosplenomegally  Skin: no bruising or bleeding and normal skin color, texture, turgor  Musculoskeletal: There is no redness, warmth, or swelling of the joints.  Full range of motion noted.  no lower extremity pitting edema present  Neurologic: Awake, alert, oriented to self only.  Cranial nerves II-XII are grossly intact.  Motor is 5 out of 5 bilaterally.   Sensory is intact.    Neuropsychiatric: Appropriate with examiner      PERTINENT LABS/IMAGING:  Recent Labs   Lab 08/15/22  0736 08/14/22  0608 08/13/22  0634 08/12/22  1135 08/12/22  1135 08/12/22  0938   WBC  --  5.5 7.6  --   --  7.6   HGB  --  13.1* 14.2  --   --  14.0   MCV  --  96 98  --   --  97    259 293  --   --  303   INR  --   --   --   --   --  1.04   NA  --  142 139  --   140  --    POTASSIUM  --  3.5 3.7  --  4.0  --    CHLORIDE  --  110* 104  --  107  --    CO2  --  24 26  --  29  --    BUN  --  19 17  --  14  --    CR 0.75 0.78 0.88   < > 0.81  --    ANIONGAP  --  8 9  --  4*  --    HUNG  --  8.3* 9.0  --  8.7  --    GLC  --  94 96  --  96  --    ALBUMIN  --   --  3.4*  --  3.1*  --    PROTTOTAL  --   --  6.8  --  6.2  --    BILITOTAL  --   --  0.4  --  0.4  --    ALKPHOS  --   --  105  --  97  --    ALT  --   --  19  --  15  --    AST  --   --  43*  --  23  --    LIPASE  --   --   --   --   --  34    < > = values in this interval not displayed.     No results found for this or any previous visit (from the past 24 hour(s)).    Discussed with patient, nursing staff and discharge planner    Isak Blood MD  [facility] Medicine Service  [phone]

## 2022-08-15 NOTE — PLAN OF CARE
"PRIMARY DIAGNOSIS: \"GENERIC\" NURSING  OUTPATIENT/OBSERVATION GOALS TO BE MET BEFORE DISCHARGE:  ADLs back to baseline: No    Activity and level of assistance: Up with standby assistance.    Pain status: Pain free.    Return to near baseline physical activity: No     Discharge Planner Nurse   Safe discharge environment identified: No  Barriers to discharge: Yes       Entered by: Ami Malagon RN 08/15/2022 3:47 AM     Please review provider order for any additional goals.   Nurse to notify provider when observation goals have been met and patient is ready for discharge.    Goal Outcome Evaluation:   Pt is A&O x3. Denies any pain. Primo fit in place and patent. Left PIV clean, dry, and intact. Pending placement.                       "

## 2022-08-15 NOTE — PLAN OF CARE
"PRIMARY DIAGNOSIS: \"GENERIC\" NURSING  OUTPATIENT/OBSERVATION GOALS TO BE MET BEFORE DISCHARGE:  ADLs back to baseline: No    Activity and level of assistance: Up with standby assistance.    Pain status: Pain free.    Return to near baseline physical activity: No     Discharge Planner Nurse   Safe discharge environment identified: No  Barriers to discharge: Yes       Entered by: Ami Malagon RN 08/15/2022 6:19 AM     Please review provider order for any additional goals.   Nurse to notify provider when observation goals have been met and patient is ready for discharge.    Goal Outcome Evaluation: Pt is A&O x3. Slept through the night. VSS. Denies any pain. Primo fit in place and patent. Left PIV clean, dry, and intact. Pending placement.                       "

## 2022-08-15 NOTE — PLAN OF CARE
"  PRIMARY DIAGNOSIS: \"GENERIC\" NURSING  OUTPATIENT/OBSERVATION GOALS TO BE MET BEFORE DISCHARGE:  ADLs back to baseline: No    Activity and level of assistance: Up with standby assistance.    Pain status: Pain free.    Return to near baseline physical activity: No     Discharge Planner Nurse   Safe discharge environment identified: No  Barriers to discharge: Yes       Entered by: Ami Malagon RN 08/15/2022 3:49 AM     Please review provider order for any additional goals.   Nurse to notify provider when observation goals have been met and patient is ready for discharge.     Goal Outcome Evaluation: Pt is A&O x3. Slept through the night. VSS. Denies any pain. Primo fit in place and patent. Left PIV clean, dry, and intact. Pending placement.     "

## 2022-08-15 NOTE — PROGRESS NOTES
MARQUEZ called an left a voicemail for [facility] regarding referral. [facility] has added pt to waitlist.    EMMETT Mccollum

## 2022-08-16 ENCOUNTER — APPOINTMENT (OUTPATIENT)
Dept: OCCUPATIONAL THERAPY | Facility: HOSPITAL | Age: 87
DRG: 177 | End: 2022-08-16
Payer: MEDICARE

## 2022-08-16 ENCOUNTER — APPOINTMENT (OUTPATIENT)
Dept: PHYSICAL THERAPY | Facility: HOSPITAL | Age: 87
DRG: 177 | End: 2022-08-16
Payer: MEDICARE

## 2022-08-16 LAB
C REACTIVE PROTEIN LHE: 1.3 MG/DL (ref 0–?)
HOLD SPECIMEN: NORMAL

## 2022-08-16 PROCEDURE — 96372 THER/PROPH/DIAG INJ SC/IM: CPT | Performed by: INTERNAL MEDICINE

## 2022-08-16 PROCEDURE — 96374 THER/PROPH/DIAG INJ IV PUSH: CPT

## 2022-08-16 PROCEDURE — 250N000013 HC RX MED GY IP 250 OP 250 PS 637: Performed by: INTERNAL MEDICINE

## 2022-08-16 PROCEDURE — 86140 C-REACTIVE PROTEIN: CPT | Performed by: INTERNAL MEDICINE

## 2022-08-16 PROCEDURE — 96376 TX/PRO/DX INJ SAME DRUG ADON: CPT

## 2022-08-16 PROCEDURE — 36415 COLL VENOUS BLD VENIPUNCTURE: CPT | Performed by: INTERNAL MEDICINE

## 2022-08-16 PROCEDURE — 250N000011 HC RX IP 250 OP 636: Performed by: STUDENT IN AN ORGANIZED HEALTH CARE EDUCATION/TRAINING PROGRAM

## 2022-08-16 PROCEDURE — G0378 HOSPITAL OBSERVATION PER HR: HCPCS

## 2022-08-16 PROCEDURE — 97110 THERAPEUTIC EXERCISES: CPT | Mod: GO

## 2022-08-16 PROCEDURE — 250N000011 HC RX IP 250 OP 636: Performed by: INTERNAL MEDICINE

## 2022-08-16 PROCEDURE — 99233 SBSQ HOSP IP/OBS HIGH 50: CPT | Performed by: INTERNAL MEDICINE

## 2022-08-16 PROCEDURE — 97116 GAIT TRAINING THERAPY: CPT | Mod: GP

## 2022-08-16 PROCEDURE — 97535 SELF CARE MNGMENT TRAINING: CPT | Mod: GO

## 2022-08-16 PROCEDURE — 97110 THERAPEUTIC EXERCISES: CPT | Mod: GP

## 2022-08-16 RX ORDER — HYDRALAZINE HYDROCHLORIDE 20 MG/ML
10 INJECTION INTRAMUSCULAR; INTRAVENOUS EVERY 6 HOURS PRN
Status: DISCONTINUED | OUTPATIENT
Start: 2022-08-16 | End: 2022-08-23 | Stop reason: HOSPADM

## 2022-08-16 RX ADMIN — ANORECTAL OINTMENT: 15.7; .44; 24; 20.6 OINTMENT TOPICAL at 14:33

## 2022-08-16 RX ADMIN — ENOXAPARIN SODIUM 40 MG: 40 INJECTION SUBCUTANEOUS at 17:09

## 2022-08-16 RX ADMIN — ANORECTAL OINTMENT: 15.7; .44; 24; 20.6 OINTMENT TOPICAL at 10:20

## 2022-08-16 RX ADMIN — ROSUVASTATIN CALCIUM 5 MG: 5 TABLET, FILM COATED ORAL at 20:40

## 2022-08-16 RX ADMIN — ANORECTAL OINTMENT: 15.7; .44; 24; 20.6 OINTMENT TOPICAL at 20:41

## 2022-08-16 RX ADMIN — LISINOPRIL 20 MG: 20 TABLET ORAL at 20:40

## 2022-08-16 RX ADMIN — Medication 1000 UNITS: at 20:40

## 2022-08-16 RX ADMIN — ASPIRIN 81 MG CHEWABLE TABLET 81 MG: 81 TABLET CHEWABLE at 20:40

## 2022-08-16 RX ADMIN — HYDRALAZINE HYDROCHLORIDE 10 MG: 20 INJECTION, SOLUTION INTRAMUSCULAR; INTRAVENOUS at 17:06

## 2022-08-16 RX ADMIN — HYDRALAZINE HYDROCHLORIDE 10 MG: 20 INJECTION, SOLUTION INTRAMUSCULAR; INTRAVENOUS at 03:51

## 2022-08-16 ASSESSMENT — ACTIVITIES OF DAILY LIVING (ADL)
ADLS_ACUITY_SCORE: 43

## 2022-08-16 NOTE — PROGRESS NOTES
"PRIMARY DIAGNOSIS: \"GENERIC\" NURSING  OUTPATIENT/OBSERVATION GOALS TO BE MET BEFORE DISCHARGE:  1. ADLs back to baseline: No    2. Activity and level of assistance: Up with maximum assistance. Consider SW and/or PT evaluation.     3. Pain status: Pain free.    4. Return to near baseline physical activity: No     Discharge Planner Nurse   Safe discharge environment identified: No  Barriers to discharge: Yes       Entered by: Caitlin Flores RN 08/16/2022 6:28 AM     Pt is disoriented to situation. Pleasantly confused and needs frequent reorientation. Pt became very impulse and restless overnight, frequently getting out of bed, and removing telemetry patches. Initiated video monitoring at nurses station for safety.     BP was 185/81. Notified MD and obtained order for hydralazine. Recheck BP was 167/71. Heart rhythm is sinus tachycardic. HR elevated highest at 130.   "

## 2022-08-16 NOTE — PROGRESS NOTES
[facility]    Medicine Progress Note - Hospitalist Service    Date of Admission:  8/12/2022     Assessment & Plan   SUMMARY:  86 year old male with history of HTN, CABG x4 in 2016, HTN, CAD who presents from senior living facility after a fall, found to have generalized weakness and confusion secondary to COVID.      Active Problems:  COVID-19 infection  -- Asymptomatic, not hypoxic. CRP =1.3  -  Has not received remdesivir or steroids  - Continue Lovenox  --CTA chest abdomen pelvis negative for acute pathology  --Generalized weakness and confusion likely only symptoms and seem to be improving  --Covid recovered on 8/23/2022     Generalized weakness and confusion likely secondary to above, seems to be improving  --No signs of dehydration or sepsis- WBC normal, UA clean   --PT/OT-slum  --Patient's family requesting higher level of care-care management for placement, needs to be off 1:1     2nd Degree Heart Block Type 1  -- Noted sinus arrhythmia with pauses on 8/13 (3-4 seconds)  - Cardiology consult, appreciate input  -- Stopped metoprolol as recommended by cardiology  -- Cardiology recommending Holter as outpatient     Acute metabolic encephalopathy  - Likely related to COVID infection  - Suspect underlying dementia  - CT head and cervical spine reviewed with no acute pathology   - Will need to be off 1:1 for TCU     S/P Fall  - Unwitnessed, patient does not remember-- related to heart block versus dementia??   - CT head and neck negative for significant acute changes  - ECHO is unremarkable.  Stress reviewed from 2016 with EF of 55%.   Check orthostatics-- OK   Knee x-rays and tibia-fibula x-rays with no fractures or effusions.  - Patient needs placement     Penile wound    - WOC has evaluated  -Start Calmoseptine ointment     Known CAD status post CABG x4 in 2016   -Stable.   -  Continue aspirin, metoprolol     HTN  -Stable.    - Continue lisinopril     Principal Problem:    Infection due  "to 2019 novel coronavirus  Active Problems:    Generalized muscle weakness    Fall, initial encounter    Atrioventricular block, Mobitz type 1, Wenckebach    Metabolic encephalopathy    DVT prophylaxis:    Medical:  subcutaneous enoxaparin    Mechanical:  PCD's       Expected Discharge Date: 08/23/2022    Discharge Delays: Placement - TCU    Discharge Comments: 8/14  trial off 1:1- needs placement      Diet: Orders Placed This Encounter      Combination Diet Regular Diet Adult    Joseph Catheter: Not present  Central Lines/Port-a-cath: Not present  Drains: Not present     --------------------------------------------    The patient's care was discussed with the Patient and Bedside Nurse.    Adarsh Cade MD  Hospitalist Service  [facility]  Securely message with the Vocera Web Console (learn more here)  Text page via [employer] Paging/Directory    Clinically Significant Risk Factors Present on Admission                  ______________________________________________________________________    Interval History   Patient remembers he is at [facility] for the last 4 days.     ROS: Denies chest pain, denies shortness of breath and good appetite    Data personally reviewed from the last 24 hours:   Reviewed Laboratory results, Consultant recommendations and medications     Physical Exam   BP (!) 142/76 (BP Location: Right arm)   Pulse 102   Temp 97.8  F (36.6  C) (Oral)   Resp 18   Ht 1.778 m (5' 10\")   Wt 83.9 kg (185 lb)   SpO2 95%   BMI 26.54 kg/m      Physical Exam    General Appearance:    HEENT:  Awake, Alert, Cooperative, in no distress and appears stated age   Normocephalic, atraumatic, conjunctiva clear without icterus and ears without discharge   Lungs:   Crackles at bases   Cardiovascular:  Regular Rate and Rythm, normal apical impulse, normal S1 and S2, no lower extremity edema bilaterally   Abdomen: Soft, non-tender and Non-distended, active bowel sounds   Skin:  Skin " color, texture normal and bruising or bleeding. No rashes or lesions over face, neck, arms and legs, turgor normal.   Neurologic:    Neuropsychiatric: Alert but confused, Facial symmetry preserved and upper & lower extremities moving well with symmetry  Calm, normal eye contact, Affect normal     Data   Recent Labs   Lab 08/15/22  0736 08/14/22  0608 08/13/22  0634 08/12/22  1135 08/12/22  1135 08/12/22  0938   WBC  --  5.5 7.6  --   --  7.6   HGB  --  13.1* 14.2  --   --  14.0   MCV  --  96 98  --   --  97    259 293  --   --  303   INR  --   --   --   --   --  1.04   NA  --  142 139  --  140  --    POTASSIUM  --  3.5 3.7  --  4.0  --    CHLORIDE  --  110* 104  --  107  --    CO2  --  24 26  --  29  --    BUN  --  19 17  --  14  --    CR 0.75 0.78 0.88   < > 0.81  --    ANIONGAP  --  8 9  --  4*  --    HUNG  --  8.3* 9.0  --  8.7  --    GLC  --  94 96  --  96  --    ALBUMIN  --   --  3.4*  --  3.1*  --    PROTTOTAL  --   --  6.8  --  6.2  --    BILITOTAL  --   --  0.4  --  0.4  --    ALKPHOS  --   --  105  --  97  --    ALT  --   --  19  --  15  --    AST  --   --  43*  --  23  --    LIPASE  --   --   --   --   --  34    < > = values in this interval not displayed.

## 2022-08-16 NOTE — PLAN OF CARE
"    PRIMARY DIAGNOSIS: \"GENERIC\" NURSING  OUTPATIENT/OBSERVATION GOALS TO BE MET BEFORE DISCHARGE:  1. ADLs back to baseline: No    2. Activity and level of assistance: Up with maximum assistance.    3. Pain status: Pain free.    4. Return to near baseline physical activity: No     Discharge Planner Nurse   Safe discharge environment identified: No  Barriers to discharge: Yes       Entered by: Radha Iniguez RN 08/15/2022 10:32 PM    Pt denied pain during this shift. Appeared comfortable.  Pt had no safety incidents this shift. Reminded to call and wait for assistance with transfers and ambulation. Pt verbalized understanding.  Telemetry; Sinus tach. with 1 degree block; then at bedtime NSR.   Pt had no complaint this shift. Writer assisted pt to call sister per request. Pt assisted with hygiene and Calmoseptine applied to perineum/penis per order.   Pt is resting quietly.     Please review provider order for any additional goals.   Nurse to notify provider when observation goals have been met and patient is ready for discharge.Goal Outcome Evaluation:                      "

## 2022-08-17 ENCOUNTER — APPOINTMENT (OUTPATIENT)
Dept: OCCUPATIONAL THERAPY | Facility: HOSPITAL | Age: 87
DRG: 177 | End: 2022-08-17
Payer: MEDICARE

## 2022-08-17 ENCOUNTER — APPOINTMENT (OUTPATIENT)
Dept: PHYSICAL THERAPY | Facility: HOSPITAL | Age: 87
DRG: 177 | End: 2022-08-17
Payer: MEDICARE

## 2022-08-17 LAB — D DIMER PPP FEU-MCNC: 0.63 UG/ML FEU (ref 0–0.5)

## 2022-08-17 PROCEDURE — 97530 THERAPEUTIC ACTIVITIES: CPT | Mod: GP

## 2022-08-17 PROCEDURE — 85379 FIBRIN DEGRADATION QUANT: CPT | Performed by: INTERNAL MEDICINE

## 2022-08-17 PROCEDURE — 250N000013 HC RX MED GY IP 250 OP 250 PS 637: Performed by: INTERNAL MEDICINE

## 2022-08-17 PROCEDURE — 97535 SELF CARE MNGMENT TRAINING: CPT | Mod: GO

## 2022-08-17 PROCEDURE — 36415 COLL VENOUS BLD VENIPUNCTURE: CPT | Performed by: INTERNAL MEDICINE

## 2022-08-17 PROCEDURE — 250N000011 HC RX IP 250 OP 636: Performed by: INTERNAL MEDICINE

## 2022-08-17 PROCEDURE — 99232 SBSQ HOSP IP/OBS MODERATE 35: CPT | Performed by: INTERNAL MEDICINE

## 2022-08-17 PROCEDURE — G0378 HOSPITAL OBSERVATION PER HR: HCPCS

## 2022-08-17 PROCEDURE — 96372 THER/PROPH/DIAG INJ SC/IM: CPT | Performed by: INTERNAL MEDICINE

## 2022-08-17 PROCEDURE — 99207 PR CDG-CUT & PASTE-POTENTIAL IMPACT ON LEVEL: CPT | Performed by: INTERNAL MEDICINE

## 2022-08-17 RX ORDER — CEPHALEXIN 500 MG/1
500 CAPSULE ORAL EVERY 8 HOURS SCHEDULED
Status: DISCONTINUED | OUTPATIENT
Start: 2022-08-17 | End: 2022-08-22

## 2022-08-17 RX ADMIN — ROSUVASTATIN CALCIUM 5 MG: 5 TABLET, FILM COATED ORAL at 21:05

## 2022-08-17 RX ADMIN — Medication 1000 UNITS: at 21:05

## 2022-08-17 RX ADMIN — LISINOPRIL 20 MG: 20 TABLET ORAL at 21:05

## 2022-08-17 RX ADMIN — CEPHALEXIN 500 MG: 500 CAPSULE ORAL at 21:05

## 2022-08-17 RX ADMIN — ACETAMINOPHEN 650 MG: 325 TABLET ORAL at 01:36

## 2022-08-17 RX ADMIN — ENOXAPARIN SODIUM 40 MG: 40 INJECTION SUBCUTANEOUS at 17:03

## 2022-08-17 RX ADMIN — ANORECTAL OINTMENT: 15.7; .44; 24; 20.6 OINTMENT TOPICAL at 13:34

## 2022-08-17 RX ADMIN — ASPIRIN 81 MG CHEWABLE TABLET 81 MG: 81 TABLET CHEWABLE at 21:05

## 2022-08-17 RX ADMIN — ANORECTAL OINTMENT: 15.7; .44; 24; 20.6 OINTMENT TOPICAL at 21:05

## 2022-08-17 RX ADMIN — ANORECTAL OINTMENT: 15.7; .44; 24; 20.6 OINTMENT TOPICAL at 09:47

## 2022-08-17 RX ADMIN — CEPHALEXIN 500 MG: 500 CAPSULE ORAL at 13:34

## 2022-08-17 ASSESSMENT — ACTIVITIES OF DAILY LIVING (ADL)
ADLS_ACUITY_SCORE: 43

## 2022-08-17 NOTE — PROGRESS NOTES
Care Management Follow Up    Length of Stay (days): 0    Expected Discharge Date: 08/23/2022     Concerns to be Addressed:     Needs to be off 1:1  Patient plan of care discussed at interdisciplinary rounds: Yes    Anticipated Discharge Disposition: Transitional Care     Anticipated Discharge Services:    Anticipated Discharge DME:      Referrals Placed by CM/SW:    Private pay costs discussed: Not applicable    Additional Information:    Seeking covid+ TCU - pt needs to be off 1:1 for 24 hrs before most TCUs will re-screen patient. MARQUEZCM left VM with [facility] this AM asking about bed availability. [facility]. CM following.     Callback from [facility] - no beds available and none expected. Pt continues on waitlist. TCU will need updated notes once pt is closer to discharge for re-screening.       CALVIN StacySW

## 2022-08-17 NOTE — PLAN OF CARE
"PRIMARY DIAGNOSIS: \"GENERIC\" NURSING  OUTPATIENT/OBSERVATION GOALS TO BE MET BEFORE DISCHARGE:  ADLs back to baseline: Yes    Activity and level of assistance: Up with standby assistance.    Pain status: Improved-controlled with oral pain medications.    Return to near baseline physical activity: Yes     Discharge Planner Nurse   Safe discharge environment identified: Yes  Barriers to discharge: Yes       Entered by: Ami Malagon RN 08/17/2022 5:43 AM     Please review provider order for any additional goals.   Nurse to notify provider when observation goals have been met and patient is ready for discharge.    Goal Outcome Evaluation: Pt is A&O x3. Confused but easily redirected. Slept through the night. VSS. Aide said pt was c/o back pain. Back pain was manage with tylenol. Left PIV clean, dry, and intact. Pending placement.                       "

## 2022-08-17 NOTE — PLAN OF CARE
"PRIMARY DIAGNOSIS: \"GENERIC\" NURSING  OUTPATIENT/OBSERVATION GOALS TO BE MET BEFORE DISCHARGE:  ADLs back to baseline: Yes    Activity and level of assistance: Up with standby assistance.    Pain status: Pain free.    Return to near baseline physical activity: Yes     Discharge Planner Nurse   Safe discharge environment identified: No  Barriers to discharge: Yes       Entered by: Ami Malagon RN 08/17/2022 3:33 AM     Please review provider order for any additional goals.   Nurse to notify provider when observation goals have been met and patient is ready for discharge.    Goal Outcome Evaluation: Pt is confused but easily redirected. Slept through the night. VSS. Denies any pain. Primo fit in place and patent. Left PIV clean, dry, and intact. Pending placement.                        "

## 2022-08-17 NOTE — PROGRESS NOTES
[facility]    Medicine Progress Note - Hospitalist Service    Date of Admission:  8/12/2022     Assessment & Plan   SUMMARY:  86 year old male with history of HTN, CABG x4 in 2016, HTN, CAD who presents from senior living facility after a fall, found to have generalized weakness and confusion secondary to COVID.      Active Problems:  COVID-19 infection  -- Asymptomatic, not hypoxic. CRP =1.3  -  Has not received remdesivir or steroids  - Continue Lovenox  --CTA chest abdomen pelvis negative for acute pathology  --Generalized weakness and confusion likely only symptoms and seem to be improving  --Covid recovered on 8/23/2022     Generalized weakness and confusion likely secondary to above, seems to be improving  --No signs of dehydration or sepsis- WBC normal, UA clean   --PT/OT-slum  --Patient's family requesting higher level of care-care management for placement, needs to be off 1:1     2nd Degree Heart Block Type 1  -- Noted sinus arrhythmia with pauses on 8/13 (3-4 seconds)  - Cardiology consult, appreciate input  -- Stopped metoprolol as recommended by cardiology  -- Cardiology recommending Holter as outpatient     Acute metabolic encephalopathy  - Likely related to COVID infection  - Suspect underlying dementia  - CT head and cervical spine reviewed with no acute pathology   - Will need to be off 1:1 for TCU     S/P Fall  - Unwitnessed, patient does not remember-- related to heart block versus dementia??   - CT head and neck negative for significant acute changes  - ECHO is unremarkable.  Stress reviewed from 2016 with EF of 55%.   Check orthostatics-- OK   Knee x-rays and tibia-fibula x-rays with no fractures or effusions.  - Patient needs placement     Penile wound   - WOC has evaluated  - Calmoseptine ointment     Known CAD status post CABG x4 in 2016   -Stable.   -  Continue aspirin, metoprolol     HTN  -Stable.    - Continue lisinopril     DVT prophylaxis:    Medical:   "subcutaneous enoxaparin    Mechanical:  PCD's       Expected Discharge Date: 08/23/2022    Discharge Delays: Placement - TCU    Discharge Comments: 8/14  trial off 1:1- needs placement      Diet: Orders Placed This Encounter      Combination Diet Regular Diet Adult    Joseph Catheter: Not present  Central Lines/Port-a-cath: Not present  Drains: Not present      Principal Problem:    Infection due to 2019 novel coronavirus  Active Problems:    Generalized muscle weakness    Fall, initial encounter    Atrioventricular block, Mobitz type 1, Wenckebach    Metabolic encephalopathy     --------------------------------------------    The patient's care was discussed with the Patient and Bedside Nurse.    Adarsh Cade MD  Hospitalist Service  [facility]  Securely message with the Voc[employer] Web Console (learn more here)  Text page via [employer] Paging/Directory    Clinically Significant Risk Factors Present on Admission                  ______________________________________________________________________    Interval History   Patient confused     ROS: Denies chest pain, denies shortness of breath, Moving bowels well, passing urine well, slept well and good appetite    Data personally reviewed from the last 24 hours:   Reviewed telemetry, Laboratory results, Consultant recommendations and medications     Physical Exam   BP (!) 159/76 (BP Location: Right arm, Patient Position: Chair, Cuff Size: Adult Regular)   Pulse 84   Temp 98.7  F (37.1  C) (Axillary)   Resp 16   Ht 1.778 m (5' 10\")   Wt 83.9 kg (185 lb)   SpO2 97%   BMI 26.54 kg/m      Physical Exam    General Appearance:    HEENT:  Awake, Alert, Cooperative, in no distress and appears stated age   Normocephalic, atraumatic, conjunctiva clear without icterus and ears without discharge   Lungs:   Clear to auscultation bilaterally, no wheezing, good air exchange, normal work of breathing   Cardiovascular:  Regular Rate and Rythm, normal apical " impulse, normal S1 and S2, no lower extremity edema bilaterally   Abdomen: Soft, non-tender and Non-distended, active bowel sounds   Skin:  Skin color, texture normal and bruising or bleeding. No rashes or lesions over face, neck, arms and legs, turgor normal.   Neurologic:    Neuropsychiatric: Alert but confused, Facial symmetry preserved and upper & lower extremities moving well with symmetry  Calm, normal eye contact, Affect normal     Data   Recent Labs   Lab 08/15/22  0736 08/14/22  0608 08/13/22  0634 08/12/22  1135 08/12/22  1135 08/12/22  0938   WBC  --  5.5 7.6  --   --  7.6   HGB  --  13.1* 14.2  --   --  14.0   MCV  --  96 98  --   --  97    259 293  --   --  303   INR  --   --   --   --   --  1.04   NA  --  142 139  --  140  --    POTASSIUM  --  3.5 3.7  --  4.0  --    CHLORIDE  --  110* 104  --  107  --    CO2  --  24 26  --  29  --    BUN  --  19 17  --  14  --    CR 0.75 0.78 0.88   < > 0.81  --    ANIONGAP  --  8 9  --  4*  --    HUNG  --  8.3* 9.0  --  8.7  --    GLC  --  94 96  --  96  --    ALBUMIN  --   --  3.4*  --  3.1*  --    PROTTOTAL  --   --  6.8  --  6.2  --    BILITOTAL  --   --  0.4  --  0.4  --    ALKPHOS  --   --  105  --  97  --    ALT  --   --  19  --  15  --    AST  --   --  43*  --  23  --    LIPASE  --   --   --   --   --  34    < > = values in this interval not displayed.

## 2022-08-17 NOTE — PLAN OF CARE
"Goal Outcome Evaluation:  PRIMARY DIAGNOSIS: \"GENERIC\" NURSING  OUTPATIENT/OBSERVATION GOALS TO BE MET BEFORE DISCHARGE:  1. ADLs back to baseline: No    2. Activity and level of assistance: Up with maximum assistance. Consider SW and/or PT evaluation.     3. Pain status: Pain free.    4. Return to near baseline physical activity: No     Discharge Planner Nurse   Safe discharge environment identified: No  Barriers to discharge: Yes       Entered by: Sania Corrigan RN 08/16/2022 10:51 PM   Pt is very confused and needs consistent re-orientation however, he had fairly quiet shift, pt remains on 1:1 sitter. Pt is on tele and has been tachy with 1st degree AVB. No s/sx of pain. Will continue to monitor.  Please review provider order for any additional goals.   Nurse to notify provider when observation goals have been met and patient is ready for discharge.                    "

## 2022-08-17 NOTE — PLAN OF CARE
Problem: Plan of Care - These are the overarching goals to be used throughout the patient stay.    Goal: Plan of Care Review/Shift Note  Description: The Plan of Care Review/Shift note should be completed every shift.  The Outcome Evaluation is a brief statement about your assessment that the patient is improving, declining, or no change.  This information will be displayed automatically on your shift note.  Outcome: Ongoing, Progressing  Goal: Absence of Hospital-Acquired Illness or Injury  Intervention: Identify and Manage Fall Risk  Recent Flowsheet Documentation  Taken 8/17/2022 0900 by Jose Antonio Cano RN  Safety Promotion/Fall Prevention: activity supervised  Intervention: Prevent Skin Injury  Recent Flowsheet Documentation  Taken 8/17/2022 0900 by Jose Antonio Cano RN  Body Position: position changed independently  Intervention: Prevent and Manage VTE (Venous Thromboembolism) Risk  Recent Flowsheet Documentation  Taken 8/17/2022 0900 by Jose Antonio Cano RN  Range of Motion: active ROM (range of motion) encouraged  Activity Management: activity encouraged  Intervention: Prevent Infection  Recent Flowsheet Documentation  Taken 8/17/2022 0900 by Jose Antonio Cano RN  Infection Prevention: personal protective equipment utilized  Goal: Optimal Comfort and Wellbeing  Intervention: Provide Person-Centered Care  Recent Flowsheet Documentation  Taken 8/17/2022 0900 by Jose Antonio Cano RN  Trust Relationship/Rapport: care explained   Goal Outcome Evaluation:      Patient assisted to chair x 2, ate breakfast, was able to eat lunch independently. Had 100% of lunch and  about 500 ml of fluids. More awake this afternoon, responding appropriately to direction and less agitated. Ambulated to bathroom with one assistance, incontinent of urine and bowels, had one medium soft BM this shift.

## 2022-08-18 ENCOUNTER — APPOINTMENT (OUTPATIENT)
Dept: OCCUPATIONAL THERAPY | Facility: HOSPITAL | Age: 87
DRG: 177 | End: 2022-08-18
Payer: MEDICARE

## 2022-08-18 LAB
CREAT SERPL-MCNC: 0.77 MG/DL (ref 0.7–1.3)
GFR SERPL CREATININE-BSD FRML MDRD: 88 ML/MIN/1.73M2
PLATELET # BLD AUTO: 281 10E3/UL (ref 150–450)

## 2022-08-18 PROCEDURE — G0378 HOSPITAL OBSERVATION PER HR: HCPCS

## 2022-08-18 PROCEDURE — 250N000011 HC RX IP 250 OP 636: Performed by: INTERNAL MEDICINE

## 2022-08-18 PROCEDURE — 36415 COLL VENOUS BLD VENIPUNCTURE: CPT | Performed by: INTERNAL MEDICINE

## 2022-08-18 PROCEDURE — 85049 AUTOMATED PLATELET COUNT: CPT | Performed by: INTERNAL MEDICINE

## 2022-08-18 PROCEDURE — 96372 THER/PROPH/DIAG INJ SC/IM: CPT | Performed by: INTERNAL MEDICINE

## 2022-08-18 PROCEDURE — 250N000013 HC RX MED GY IP 250 OP 250 PS 637: Performed by: INTERNAL MEDICINE

## 2022-08-18 PROCEDURE — 120N000001 HC R&B MED SURG/OB

## 2022-08-18 PROCEDURE — 97110 THERAPEUTIC EXERCISES: CPT | Mod: GO

## 2022-08-18 PROCEDURE — 99232 SBSQ HOSP IP/OBS MODERATE 35: CPT | Performed by: INTERNAL MEDICINE

## 2022-08-18 PROCEDURE — 82565 ASSAY OF CREATININE: CPT | Performed by: INTERNAL MEDICINE

## 2022-08-18 PROCEDURE — 97535 SELF CARE MNGMENT TRAINING: CPT | Mod: GO

## 2022-08-18 RX ADMIN — ANORECTAL OINTMENT: 15.7; .44; 24; 20.6 OINTMENT TOPICAL at 21:02

## 2022-08-18 RX ADMIN — ACETAMINOPHEN 650 MG: 325 TABLET ORAL at 09:37

## 2022-08-18 RX ADMIN — ENOXAPARIN SODIUM 40 MG: 40 INJECTION SUBCUTANEOUS at 16:36

## 2022-08-18 RX ADMIN — ANORECTAL OINTMENT: 15.7; .44; 24; 20.6 OINTMENT TOPICAL at 13:21

## 2022-08-18 RX ADMIN — CEPHALEXIN 500 MG: 500 CAPSULE ORAL at 06:59

## 2022-08-18 RX ADMIN — CEPHALEXIN 500 MG: 500 CAPSULE ORAL at 21:02

## 2022-08-18 RX ADMIN — LISINOPRIL 20 MG: 20 TABLET ORAL at 21:02

## 2022-08-18 RX ADMIN — ROSUVASTATIN CALCIUM 5 MG: 5 TABLET, FILM COATED ORAL at 21:02

## 2022-08-18 RX ADMIN — ANORECTAL OINTMENT: 15.7; .44; 24; 20.6 OINTMENT TOPICAL at 09:43

## 2022-08-18 RX ADMIN — ASPIRIN 81 MG CHEWABLE TABLET 81 MG: 81 TABLET CHEWABLE at 21:02

## 2022-08-18 RX ADMIN — CEPHALEXIN 500 MG: 500 CAPSULE ORAL at 13:21

## 2022-08-18 ASSESSMENT — ACTIVITIES OF DAILY LIVING (ADL)
ADLS_ACUITY_SCORE: 43

## 2022-08-18 NOTE — PLAN OF CARE
Goal Outcome Evaluation:      Problem: Fatigue  Goal: Improved Activity Tolerance  Outcome: Ongoing, Progressing  Intervention: Promote Improved Energy  Recent Flowsheet Documentation  Taken 8/18/2022 1329 by Sharyn Henry, RN  Activity Management: activity adjusted per tolerance  Taken 8/17/2022 6044 by Sharyn Henry, RN  Activity Management: activity adjusted per tolerance       Pt remains on 1:1 for safety. Pt A&O to self only but pleasantly confused. Pt very easily redirectable and cooperative with cares.     Sharyn Henry, RN

## 2022-08-18 NOTE — PLAN OF CARE
"  Problem: Plan of Care - These are the overarching goals to be used throughout the patient stay.    Goal: Optimal Comfort and Wellbeing  Outcome: Ongoing, Progressing   PRN Tylenol given; Pt denies pain.      Problem: Plan of Care - These are the overarching goals to be used throughout the patient stay.    Goal: Absence of Hospital-Acquired Illness or Injury  Outcome: Ongoing, Progressing  Intervention: Prevent Infection  Recent Flowsheet Documentation  Taken 8/18/2022 1100 by Radha Iniguez RN  Infection Prevention: hand hygiene promoted  Pt had PT/OT today. Up in chair for meals; Ate 100%.  Pt had no safety incidents during this shift.    Telemetry 2nd degree AV block, then Sinus rhythm with PACs and then sinus rhythm    Writer call Elizabeth Villalba with pt update per request.  Pt had no further complaint during this shift.  PRIMARY DIAGNOSIS: \"GENERIC\" NURSING  OUTPATIENT/OBSERVATION GOALS TO BE MET BEFORE DISCHARGE:  1. ADLs back to baseline: No    2. Activity and level of assistance: Up with maximum assistance. Consider SW and/or PT evaluation.     3. Pain status: Improved-controlled with oral pain medications.    4. Return to near baseline physical activity: No     Discharge Planner Nurse   Safe discharge environment identified: Yes  Barriers to discharge: Yes       Entered by: Radha Iniguez RN 08/18/2022 1:03 PM       Please review provider order for any additional goals.   Nurse to notify provider when observation goals have been met and patient is ready for discharge.       Goal Outcome Evaluation:                      "

## 2022-08-18 NOTE — PROGRESS NOTES
[facility]    Medicine Progress Note - Hospitalist Service    Date of Admission:  8/12/2022     Assessment & Plan   SUMMARY:  86 year old male with history of HTN, CABG x4 in 2016, HTN, CAD who presents from senior living facility after a fall, found to have generalized weakness and confusion secondary to COVID.      Active Problems:  COVID-19 infection  -- Asymptomatic, not hypoxic. 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   - CT head and neck negative for significant acute changes  - ECHO is unremarkable.  Stress reviewed from 2016 with EF of 55%.   Check orthostatics-- OK   Knee x-rays and tibia-fibula x-rays with no fractures or effusions.  - Patient needs placement     Penile wound   - WOC has evaluated  - Calmoseptine ointment     Known CAD status post CABG x4 in 2016   -Stable.   -  Continue aspirin, metoprolol     HTN  -Stable.    - Continue lisinopril     DVT  "prophylaxis:    Medical:  subcutaneous enoxaparin    Mechanical:  PCD's       Expected Discharge Date: 08/23/2022    Discharge Delays: Placement - TCU    Discharge Comments: 8/14  trial off 1:1- needs placement      Diet: Orders Placed This Encounter      Combination Diet Regular Diet Adult    Joseph Catheter: Not present  Central Lines/Port-a-cath: Not present  Drains: Not present    Principal Problem:    Infection due to 2019 novel coronavirus  Active Problems:    Generalized muscle weakness    Fall, initial encounter    Atrioventricular block, Mobitz type 1, Wenckebach    Metabolic encephalopathy     --------------------------------------------    The patient's care was discussed with the Patient.    Adarsh Cade MD  Hospitalist Service  [facility]  Securely message with the Vocera Web Console (learn more here)  Text page via [employer] Paging/Directory    Clinically Significant Risk Factors Present on Admission                  ______________________________________________________________________    Interval History   Patient is comfortable, knows he is in hospital waiting for Covid-19 recovery.     ROS: Denies chest pain, denies shortness of breath, passing urine well, slept well and good appetite    Data personally reviewed from the last 24 hours:   Reviewed telemetry, Laboratory results, Consultant recommendations and medications     Physical Exam   BP (!) 140/75 (BP Location: Left arm)   Pulse 74   Temp 98.1  F (36.7  C) (Oral)   Resp 16   Ht 1.778 m (5' 10\")   Wt 83.9 kg (185 lb)   SpO2 94%   BMI 26.54 kg/m      Physical Exam    General Appearance:    HEENT:  Awake, Alert, Cooperative, in no distress and appears stated age   Normocephalic, atraumatic, conjunctiva clear without icterus and ears without discharge   Lungs:   Clear to auscultation bilaterally, no wheezing, good air exchange, normal work of breathing   Cardiovascular:  Regular Rate and Rythm, normal apical impulse, " normal S1 and S2, no lower extremity edema bilaterally   Abdomen: Soft, non-tender and Non-distended, active bowel sounds   Skin:  Skin color, texture normal and bruising or bleeding. No rashes or lesions over face, neck, arms and legs, turgor normal.   Neurologic:    Neuropsychiatric: Alert but confused, Facial symmetry preserved and upper & lower extremities moving well with symmetry  Calm, normal eye contact, Affect normal     Data   Recent Labs   Lab 08/18/22  0631 08/15/22  0736 08/14/22  0608 08/13/22  0634 08/12/22  1135 08/12/22  1135 08/12/22  0938   WBC  --   --  5.5 7.6  --   --  7.6   HGB  --   --  13.1* 14.2  --   --  14.0   MCV  --   --  96 98  --   --  97    256 259 293  --   --  303   INR  --   --   --   --   --   --  1.04   NA  --   --  142 139  --  140  --    POTASSIUM  --   --  3.5 3.7  --  4.0  --    CHLORIDE  --   --  110* 104  --  107  --    CO2  --   --  24 26  --  29  --    BUN  --   --  19 17  --  14  --    CR 0.77 0.75 0.78 0.88   < > 0.81  --    ANIONGAP  --   --  8 9  --  4*  --    HUNG  --   --  8.3* 9.0  --  8.7  --    GLC  --   --  94 96  --  96  --    ALBUMIN  --   --   --  3.4*  --  3.1*  --    PROTTOTAL  --   --   --  6.8  --  6.2  --    BILITOTAL  --   --   --  0.4  --  0.4  --    ALKPHOS  --   --   --  105  --  97  --    ALT  --   --   --  19  --  15  --    AST  --   --   --  43*  --  23  --    LIPASE  --   --   --   --   --   --  34    < > = values in this interval not displayed.

## 2022-08-18 NOTE — PLAN OF CARE
Problem: Plan of Care - These are the overarching goals to be used throughout the patient stay.    Goal: Plan of Care Review/Shift Note  Outcome: Ongoing, Progressing     Problem: Pain Acute  Goal: Acceptable Pain Control and Functional Ability  Outcome: Ongoing, Progressing  Intervention: Prevent or Manage Pain  Recent Flowsheet Documentation  Taken 8/17/2022 1850 by Sania Corrigan RN  Medication Review/Management: medications reviewed     Problem: Hypertension Comorbidity  Goal: Blood Pressure in Desired Range  Outcome: Ongoing, Progressing  Intervention: Maintain Blood Pressure Management  Recent Flowsheet Documentation  Taken 8/17/2022 1850 by Sania Corrigan RN  Medication Review/Management: medications reviewed   Goal Outcome Evaluation:      [id number].... Pt had quiet shift, less restless and impulsive, still confused but manageable with cares. Pt remains on 1:1 sitter for safety, will continue to monitor.

## 2022-08-18 NOTE — UTILIZATION REVIEW
Admission Status; Secondary Review Determination   Under the authority of the Utilization Management Committee, the utilization review process indicated a secondary review on George R Milligan. The review outcome is based on review of the medical records, discussions with staff, and applying clinical experience noted on the date of the review.   (x) Inpatient Status Appropriate - This patient's medical care is consistent with medical management for inpatient care and reasonable inpatient medical practice.     RATIONALE FOR DETERMINATION   86 yr old male with hx HTN, CABGx4, HTN, CAD presented after fall.  Noted weakness and confusion.  Acute COVID infection.  COVID without hypoxia or other complication except for confusion.  Agitation. Currently requiring 1:1.  Has a 2nd degree heart block type I.  Meds adjusted by cardiology.  Questioning likely undiagnosed dementia at baseline with worsening due to COVID.    At the time of admission with the information available to the attending physician more than 2 nights Hospital complex care was anticipated, based on patient risk of adverse outcome if treated as outpatient and complex care required. Inpatient admission is appropriate based on the Medicare guidelines.   The information on this document is developed by the utilization review team in order for the business office to ensure compliance. This only denotes the appropriateness of proper admission status and does not reflect the quality of care rendered.   The definitions of Inpatient Status and Observation Status used in making the determination above are those provided in the CMS Coverage Manual, Chapter 1 and Chapter 6, section 70.4.   Sincerely,   Anjali Martini MD  Utilization Review  Physician Advisor  [facility]

## 2022-08-19 ENCOUNTER — APPOINTMENT (OUTPATIENT)
Dept: OCCUPATIONAL THERAPY | Facility: HOSPITAL | Age: 87
DRG: 177 | End: 2022-08-19
Payer: MEDICARE

## 2022-08-19 ENCOUNTER — APPOINTMENT (OUTPATIENT)
Dept: PHYSICAL THERAPY | Facility: HOSPITAL | Age: 87
DRG: 177 | End: 2022-08-19
Payer: MEDICARE

## 2022-08-19 PROCEDURE — 250N000011 HC RX IP 250 OP 636: Performed by: INTERNAL MEDICINE

## 2022-08-19 PROCEDURE — 97535 SELF CARE MNGMENT TRAINING: CPT | Mod: GO

## 2022-08-19 PROCEDURE — 120N000001 HC R&B MED SURG/OB

## 2022-08-19 PROCEDURE — 250N000013 HC RX MED GY IP 250 OP 250 PS 637: Performed by: INTERNAL MEDICINE

## 2022-08-19 PROCEDURE — 97110 THERAPEUTIC EXERCISES: CPT | Mod: GO

## 2022-08-19 PROCEDURE — 99232 SBSQ HOSP IP/OBS MODERATE 35: CPT | Performed by: INTERNAL MEDICINE

## 2022-08-19 PROCEDURE — 97530 THERAPEUTIC ACTIVITIES: CPT | Mod: GP | Performed by: PHYSICAL THERAPIST

## 2022-08-19 RX ADMIN — ANORECTAL OINTMENT: 15.7; .44; 24; 20.6 OINTMENT TOPICAL at 08:54

## 2022-08-19 RX ADMIN — ANORECTAL OINTMENT: 15.7; .44; 24; 20.6 OINTMENT TOPICAL at 13:00

## 2022-08-19 RX ADMIN — ROSUVASTATIN CALCIUM 5 MG: 5 TABLET, FILM COATED ORAL at 21:35

## 2022-08-19 RX ADMIN — ASPIRIN 81 MG CHEWABLE TABLET 81 MG: 81 TABLET CHEWABLE at 21:34

## 2022-08-19 RX ADMIN — CEPHALEXIN 500 MG: 500 CAPSULE ORAL at 05:52

## 2022-08-19 RX ADMIN — CEPHALEXIN 500 MG: 500 CAPSULE ORAL at 13:00

## 2022-08-19 RX ADMIN — Medication 1000 UNITS: at 21:35

## 2022-08-19 RX ADMIN — LISINOPRIL 20 MG: 20 TABLET ORAL at 21:35

## 2022-08-19 RX ADMIN — ANORECTAL OINTMENT: 15.7; .44; 24; 20.6 OINTMENT TOPICAL at 21:35

## 2022-08-19 RX ADMIN — ENOXAPARIN SODIUM 40 MG: 40 INJECTION SUBCUTANEOUS at 18:16

## 2022-08-19 RX ADMIN — CEPHALEXIN 500 MG: 500 CAPSULE ORAL at 21:35

## 2022-08-19 ASSESSMENT — ACTIVITIES OF DAILY LIVING (ADL)
ADLS_ACUITY_SCORE: 43

## 2022-08-19 NOTE — PROGRESS NOTES
Care Management Follow Up    Length of Stay (days): 1    Expected Discharge Date: 08/23/2022     Concerns to be Addressed:    Generalized weakness, confusion on a 1:1, placement   Patient plan of care discussed at interdisciplinary rounds: Yes    Anticipated Discharge Disposition: Transitional Care     Anticipated Discharge Services:  TBD  Anticipated Discharge DME:  TBD    Patient/family educated on Medicare website which has current facility and service quality ratings:    Education Provided on the Discharge Plan:    Patient/Family in Agreement with the Plan:      Referrals Placed by CM/SW: TCU    Private pay costs discussed: Not applicable    Additional Information:  Chart review: pt lives at [facility]. Pt is independent with ADLs. Family reports pt is far from his baseline they reporte his physical and cognitive state are not at baseline.    Will need to be off 1:1 before TCU will pre-screen.    Trial off 1:1 started at 1030 today    1:52 PM  CM spoke to Elaine (JOSE RAUL) and their top TCU choices are [facility] and [facility].      Pilar Klein RN

## 2022-08-19 NOTE — PLAN OF CARE
Problem: Plan of Care - These are the overarching goals to be used throughout the patient stay.    Goal: Plan of Care Review/Shift Note  Outcome: Ongoing, Progressing       Problem: Fatigue  Goal: Improved Activity Tolerance  Outcome: Ongoing, Progressing     Problem: Pain Chronic (Persistent) (Comorbidity Management)  Goal: Acceptable Pain Control and Functional Ability  Outcome: Ongoing, Progressing  Intervention: Manage Persistent Pain  Recent Flowsheet Documentation  Taken 8/18/2022 1645 by Abdirashid Freire RN  Medication Review/Management: medications reviewed     Patient vitally stable on this shift, asymptomatic with Covid-19 infection, denies pain. Patient was admitted to inpatient status from observation on this shift. Patient alert and oriented to self and intermittently situation, pleasantly confused and easily redirected. Patient remains on 1:1 observation for safety. Patient participating with therapy this afternoon. Telemetry discontinued, reported to have PACs this morning but reading normal sinus rhythm this evening prior to removal of monitor. Social work seeking TCU placement for discharge.     Abdirashid Freire RN 11:07 PM 8/18/2022

## 2022-08-19 NOTE — PROGRESS NOTES
[facility]    Medicine Progress Note - Hospitalist Service    Date of Admission:  8/12/2022     Assessment & Plan   SUMMARY:  86 year old male with history of HTN, CABG x4 in 2016, HTN, CAD who presents from senior living facility after a fall, found to have generalized weakness and confusion secondary to COVID.    Active Problems:  COVID-19 infection  -- Asymptomatic, not hypoxic. 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   - CT head and neck negative for significant acute changes  - ECHO is unremarkable.  Stress reviewed from 2016 with EF of 55%.   Check orthostatics-- OK   Knee x-rays and tibia-fibula x-rays with no fractures or effusions.  - Patient needs placement     Penile wound   - WOC has evaluated  - Calmoseptine ointment     Known CAD status post CABG x4 in 2016   -Stable.   -  Continue aspirin, metoprolol     HTN  -Stable.    - Continue lisinopril     DVT  "prophylaxis:    Medical:  subcutaneous enoxaparin    Mechanical:  PCD's    Expected Discharge Date: 08/23/2022    Discharge Delays: Placement - TCU    Discharge Comments: 8/14  trial off 1:1- needs placement    Diet: Orders Placed This Encounter      Combination Diet Regular Diet Adult    Joseph Catheter: Not present  Central Lines/Port-a-cath: Not present  Drains: Not present    Principal Problem:    Infection due to 2019 novel coronavirus  Active Problems:    Generalized muscle weakness    Fall, initial encounter    Atrioventricular block, Mobitz type 1, Wenckebach    Metabolic encephalopathy   --------------------------------------------    The patient's care was discussed with the Patient and Bedside Nurse.    Adarsh Cade MD  Hospitalist Service  [facility]  Securely message with the Vocera Web Console (learn more here)  Text page via [employer] Paging/Directory    Clinically Significant Risk Factors Present on Admission                  ______________________________________________________________________    Interval History   Patient has no complaints, can remember some details of his hospital stay.     ROS: Denies chest pain, denies shortness of breath, passing urine well, slept well and good appetite    Data personally reviewed from the last 24 hours:   Reviewed Laboratory results, Consultant recommendations and medications     Physical Exam   /65   Pulse 71   Temp 97.9  F (36.6  C) (Oral)   Resp 18   Ht 1.778 m (5' 10\")   Wt 83.9 kg (185 lb)   SpO2 98%   BMI 26.54 kg/m      Physical Exam    General Appearance:    HEENT:  Awake, Alert, Cooperative, in no distress and appears stated age   Normocephalic, atraumatic, conjunctiva clear without icterus and ears without discharge   Lungs:   Clear to auscultation bilaterally, no wheezing, good air exchange, normal work of breathing   Cardiovascular:  Regular Rate and Rythm, normal apical impulse, normal S1 and S2, no lower " extremity edema bilaterally   Abdomen: Soft, non-tender and Non-distended, active bowel sounds   Skin:  Skin color, texture normal and bruising or bleeding. No rashes or lesions over face, neck, arms and legs, turgor normal.   Neurologic:    Neuropsychiatric: Alert but confused, Facial symmetry preserved and upper & lower extremities moving well with symmetry  Calm, normal eye contact, Affect normal     Data   Recent Labs   Lab 08/18/22  0631 08/15/22  0736 08/14/22  0608 08/13/22  0634   WBC  --   --  5.5 7.6   HGB  --   --  13.1* 14.2   MCV  --   --  96 98    256 259 293   NA  --   --  142 139   POTASSIUM  --   --  3.5 3.7   CHLORIDE  --   --  110* 104   CO2  --   --  24 26   BUN  --   --  19 17   CR 0.77 0.75 0.78 0.88   ANIONGAP  --   --  8 9   HUNG  --   --  8.3* 9.0   GLC  --   --  94 96   ALBUMIN  --   --   --  3.4*   PROTTOTAL  --   --   --  6.8   BILITOTAL  --   --   --  0.4   ALKPHOS  --   --   --  105   ALT  --   --   --  19   AST  --   --   --  43*     No results found for this or any previous visit (from the past 24 hour(s)).

## 2022-08-19 NOTE — PLAN OF CARE
Problem: Plan of Care - These are the overarching goals to be used throughout the patient stay.    Goal: Plan of Care Review/Shift Note    Outcome: Ongoing, Progressing  Flowsheets (Taken 8/19/2022 0622)  Plan of Care Reviewed With: patient     Problem: Plan of Care - These are the overarching goals to be used throughout the patient stay.    Goal: Absence of Hospital-Acquired Illness or Injury  Intervention: Identify and Manage Fall Risk  Recent Flowsheet Documentation  Taken 8/18/2022 2330 by Caitlin Flores RN  Safety Promotion/Fall Prevention: bedside attendant     Problem: Plan of Care - These are the overarching goals to be used throughout the patient stay.    Goal: Optimal Comfort and Wellbeing  Intervention: Provide Person-Centered Care  Recent Flowsheet Documentation  Taken 8/18/2022 2330 by Caitlin Flores RN  Trust Relationship/Rapport:   care explained   questions answered   thoughts/feelings acknowledged   Goal Outcome Evaluation:    Plan of Care Reviewed With: patient        Pt denies pain or discomfort. Pt is pleasantly confused. Continues on 1:1 for safety. Provided incontinence care.

## 2022-08-19 NOTE — PLAN OF CARE
Problem: Plan of Care - These are the overarching goals to be used throughout the patient stay.    Goal: Plan of Care Review/Shift Note  Description: The Plan of Care Review/Shift note should be completed every shift.  The Outcome Evaluation is a brief statement about your assessment that the patient is improving, declining, or no change.  This information will be displayed automatically on your shift note.  Outcome: Ongoing, Progressing  Flowsheets (Taken 8/19/2022 1411)  Plan of Care Reviewed With: patient  Overall Patient Progress: improving     Problem: Pain Acute  Goal: Acceptable Pain Control and Functional Ability  Outcome: Ongoing, Progressing     Problem: Risk for Delirium  Goal: Improved Attention and Thought Clarity  Outcome: Ongoing, Progressing   Goal Outcome Evaluation:    Plan of Care Reviewed With: patient     Overall Patient Progress: improving    Pt disoriented to situation and time during shift but did follow commands appropriately and was easily redirectable.  Pt at 100% of breakfast and lunch. Pt worked with PT in AM and was up to the bathroom with assist of 1, walker, and transfer belt, ambulating well.  Pt up in chair for majority of shift.  Denying any pain. VSS.  Pt observed via iPad outside room and had bed alarm and chair alarm in place and alarms audible.  No episodes of impulsivity during observation.  Report given to Pau TAPIA RN at 1315.

## 2022-08-19 NOTE — CONSULTS
"CLINICAL NUTRITION SERVICES - ASSESSMENT NOTE     Nutrition Prescription    RECOMMENDATIONS FOR MDs/PROVIDERS TO ORDER:  none    Malnutrition Status:    none    Recommendations already ordered by Registered Dietitian (RD):  none    Future/Additional Recommendations:  none     REASON FOR ASSESSMENT  George R Milligan is a/an 86 year old male assessed by the dietitian for Provider Order - \"malnutrition\"    Pt admitted with COVID-19, generalized weakness and confusion, 2nd degree heart block, acute metabolic encephalopathy, s/p fall, penile wound, CAD s/p CABG x4 (2016), HTN    NUTRITION HISTORY  Pt currently confused/disoriented. Per nursing, pt consuming 100% of meals received during admission. No documented weight loss in last year. Age appropriate muscle and adipose tissue wasting is apparent. Nutritionally pertinent labs WDL. Pt pending placement to TCU.    CURRENT NUTRITION ORDERS  Diet: Regular  Intake/Tolerance: Pt consuming 100% of meals received averaging 1298-2772 kcal daily.    LABS  Labs reviewed    MEDICATIONS  Keflex, Vitamin D3    ANTHROPOMETRICS  Height: 177.8 cm (5' 10\")  Most Recent Weight: 83.9 kg (185 lb)    IBW: 75.5 kg  BMI: Overweight BMI 25-29.9  Weight History:   Weight   6/10/2016 176 lbs   6/17/2022 185 lbs   8/12/2022 185 lbs     Dosing Weight: 75.5 kg    ASSESSED NUTRITION NEEDS  Estimated Energy Needs: 2885-6749 kcals/day (25 - 30 kcals/kg)  Justification: Maintenance  Estimated Protein Needs: 60-76 grams protein/day (0.8 - 1 grams of pro/kg)  Justification: >66yo  Estimated Fluid Needs: 1890 mL/day (25 mL/kg)   Justification: >64 yo    PHYSICAL FINDINGS  See malnutrition section below.    MALNUTRITION:  % Weight Loss:  None noted  % Intake:  No decreased intake noted  Subcutaneous Fat Loss:  None observed  Muscle Loss:  None observed  Fluid Retention:  Mild 2+ dependent edema per flowsheets    Malnutrition Diagnosis: none at this time    NUTRITION DIAGNOSIS  No nutrition diagnosis at " this time.     Monitoring/Evaluation  Will continue to monitor LOS screenings weekly.

## 2022-08-20 ENCOUNTER — APPOINTMENT (OUTPATIENT)
Dept: OCCUPATIONAL THERAPY | Facility: HOSPITAL | Age: 87
DRG: 177 | End: 2022-08-20
Payer: MEDICARE

## 2022-08-20 PROCEDURE — 97535 SELF CARE MNGMENT TRAINING: CPT | Mod: GO

## 2022-08-20 PROCEDURE — 250N000013 HC RX MED GY IP 250 OP 250 PS 637: Performed by: INTERNAL MEDICINE

## 2022-08-20 PROCEDURE — 97110 THERAPEUTIC EXERCISES: CPT | Mod: GO

## 2022-08-20 PROCEDURE — 250N000011 HC RX IP 250 OP 636: Performed by: INTERNAL MEDICINE

## 2022-08-20 PROCEDURE — 120N000001 HC R&B MED SURG/OB

## 2022-08-20 PROCEDURE — 99232 SBSQ HOSP IP/OBS MODERATE 35: CPT | Performed by: INTERNAL MEDICINE

## 2022-08-20 PROCEDURE — 99207 PR CDG-CUT & PASTE-POTENTIAL IMPACT ON LEVEL: CPT | Performed by: INTERNAL MEDICINE

## 2022-08-20 RX ADMIN — ACETAMINOPHEN 650 MG: 325 TABLET ORAL at 21:09

## 2022-08-20 RX ADMIN — ANORECTAL OINTMENT: 15.7; .44; 24; 20.6 OINTMENT TOPICAL at 21:08

## 2022-08-20 RX ADMIN — ANORECTAL OINTMENT: 15.7; .44; 24; 20.6 OINTMENT TOPICAL at 08:39

## 2022-08-20 RX ADMIN — ANORECTAL OINTMENT: 15.7; .44; 24; 20.6 OINTMENT TOPICAL at 13:08

## 2022-08-20 RX ADMIN — LISINOPRIL 20 MG: 20 TABLET ORAL at 21:08

## 2022-08-20 RX ADMIN — ROSUVASTATIN CALCIUM 5 MG: 5 TABLET, FILM COATED ORAL at 21:09

## 2022-08-20 RX ADMIN — CEPHALEXIN 500 MG: 500 CAPSULE ORAL at 06:16

## 2022-08-20 RX ADMIN — CEPHALEXIN 500 MG: 500 CAPSULE ORAL at 13:07

## 2022-08-20 RX ADMIN — CEPHALEXIN 500 MG: 500 CAPSULE ORAL at 21:09

## 2022-08-20 RX ADMIN — ENOXAPARIN SODIUM 40 MG: 40 INJECTION SUBCUTANEOUS at 16:50

## 2022-08-20 RX ADMIN — Medication 1000 UNITS: at 21:09

## 2022-08-20 RX ADMIN — ASPIRIN 81 MG CHEWABLE TABLET 81 MG: 81 TABLET CHEWABLE at 21:09

## 2022-08-20 ASSESSMENT — ACTIVITIES OF DAILY LIVING (ADL)
ADLS_ACUITY_SCORE: 29
EQUIPMENT_CURRENTLY_USED_AT_HOME: WALKER, ROLLING
ADLS_ACUITY_SCORE: 43
ADLS_ACUITY_SCORE: 29
WALKING_OR_CLIMBING_STAIRS: AMBULATION DIFFICULTY, ASSISTANCE 1 PERSON
WALKING_OR_CLIMBING_STAIRS_DIFFICULTY: YES
ADLS_ACUITY_SCORE: 39
WEAR_GLASSES_OR_BLIND: NO
ADLS_ACUITY_SCORE: 43
ADLS_ACUITY_SCORE: 29
TRANSFERRING: 1-->ASSISTANCE (EQUIPMENT/PERSON) NEEDED
DRESSING/BATHING_DIFFICULTY: NO
ADLS_ACUITY_SCORE: 29
DOING_ERRANDS_INDEPENDENTLY_DIFFICULTY: NO
CONCENTRATING,_REMEMBERING_OR_MAKING_DECISIONS_DIFFICULTY: YES
TRANSFERRING: 1-->ASSISTANCE (EQUIPMENT/PERSON) NEEDED (NOT DEVELOPMENTALLY APPROPRIATE)
NUMBER_OF_TIMES_PATIENT_HAS_FALLEN_WITHIN_LAST_SIX_MONTHS: 12
CHANGE_IN_FUNCTIONAL_STATUS_SINCE_ONSET_OF_CURRENT_ILLNESS/INJURY: YES
TOILETING_ISSUES: NO
FALL_HISTORY_WITHIN_LAST_SIX_MONTHS: YES
ADLS_ACUITY_SCORE: 43
ADLS_ACUITY_SCORE: 29
DIFFICULTY_EATING/SWALLOWING: NO
ADLS_ACUITY_SCORE: 43

## 2022-08-20 NOTE — PLAN OF CARE
Goal Outcome Evaluation:Patient disoriented to time and situation, used his call light during the shift, , but could not tell why he called, need reminders to use call light, what to see his doctor but does not know what to tell him. He follows command and can be easily redirected. Covid precautions maintained.     Goal: Absence of Hospital-Acquired Illness or Injury  Intervention: Prevent and Manage VTE (Venous Thromboembolism) Risk  Recent Flowsheet Documentation  Taken 8/20/2022 0900 by Katelynn Beckham RN  Activity Management: activity adjusted per tolerance  Taken 8/20/2022 0812 by Katelynn Beckham, RN  Activity Management: activity adjusted per tolerance     Problem: Plan of Care - These are the overarching goals to be used throughout the patient stay.    Goal: Absence of Hospital-Acquired Illness or Injury  Intervention: Prevent Infection  Recent Flowsheet Documentation  Taken 8/20/2022 0900 by Katelynn Beckham, RN  Infection Prevention:    hand hygiene promoted    personal protective equipment utilized    single patient room provided

## 2022-08-20 NOTE — PLAN OF CARE
Goal Outcome Evaluation:    Plan of Care Reviewed With: patient     Overall Patient Progress: improving    Outcome Evaluation: Confusion minimal tonight. Used call light appropriately multiple times. Requested help when he needed to void, forgetting [employer] was set up. Disoriented to time. Compliant and did not attempt to get up without assistance. Remains weak with unstable gait, using walker and gaitbelt with assist. BP remains elevated overnight, but not critical. Pleasant, took all scheduled meds and remains on PO Keflex. Maintain airborne precautions for Covid, assymptomatic.    Kwesi Zapien RN

## 2022-08-20 NOTE — PROGRESS NOTES
[facility]    Medicine Progress Note - Hospitalist Service    Date of Admission:  8/12/2022     Assessment & Plan   SUMMARY:  86 year old male with history of HTN, CABG x4 in 2016, HTN, CAD who presents from senior living facility after a fall, found to have generalized weakness and confusion secondary to COVID.    Active Problems:  COVID-19 infection  -- Asymptomatic, not hypoxic. CRP =1.3  -  Has not received remdesivir or steroids  - Continue Lovenox  --CTA chest abdomen pelvis negative for acute pathology  --Generalized weakness and confusion likely only symptoms and seem to be improving  --Covid recovered on 8/23/2022     Generalized weakness and confusion likely secondary to above, improving  --No signs of dehydration or sepsis- WBC normal, UA clean   --PT/OT - significant memory dysfunction  --Patient's family requesting higher level of care-care management for placement     2nd Degree Heart Block Type 1  -- Noted sinus arrhythmia with pauses on 8/13 (3-4 seconds)  - Cardiology consult, appreciate input  -- Stopped metoprolol as recommended by cardiology  -- Cardiology recommending Holter as outpatient     Acute metabolic encephalopathy  - Likely related to COVID infection  - Suspect underlying dementia  - CT head and cervical spine reviewed with no acute pathology      S/P Fall  - Unwitnessed, patient does not remember-- related to heart block versus dementia??   - CT head and neck negative for significant acute changes  - ECHO is unremarkable.  Stress reviewed from 2016 with EF of 55%.   Check orthostatics-- OK   Knee x-rays and tibia-fibula x-rays with no fractures or effusions.  - Patient needs placement     Penile wound   - WOC has evaluated  - Calmoseptine ointment     Known CAD status post CABG x4 in 2016   -Stable.   -  Continue aspirin, metoprolol     HTN  -Stable.    - Continue lisinopril     DVT prophylaxis:    Medical:  subcutaneous enoxaparin    Mechanical:   "PCD's    Expected Discharge Date: 08/23/2022    Discharge Delays: Placement - TCU      Diet: Orders Placed This Encounter      Combination Diet Regular Diet Adult    Joseph Catheter: Not present  Central Lines/Port-a-cath: Not present  Drains: Not present    Principal Problem:    Infection due to 2019 novel coronavirus  Active Problems:    Generalized muscle weakness    Fall, initial encounter    Atrioventricular block, Mobitz type 1, Wenckebach    Metabolic encephalopathy     --------------------------------------------    The patient's care was discussed with the Patient.    Adarsh Cade MD  Hospitalist Service  [facility]  Securely message with the Vocera Web Console (learn more here)  Text page via [employer] Paging/Directory    Clinically Significant Risk Factors Present on Admission            ______________________________________________________________________    Interval History   Patient comfortable today.     ROS: Denies chest pain, denies shortness of breath, Moving bowels well, passing urine well, slept well and good appetite    Data personally reviewed from the last 24 hours:   Reviewed Laboratory results, Consultant recommendations and medications     Physical Exam   /61 (BP Location: Left arm)   Pulse 73   Temp 98.1  F (36.7  C) (Oral)   Resp 18   Ht 1.778 m (5' 10\")   Wt 83.9 kg (185 lb)   SpO2 95%   BMI 26.54 kg/m      Physical Exam    General Appearance:    HEENT:  Awake, Alert, Cooperative, in no distress and appears stated age   Normocephalic, atraumatic, conjunctiva clear without icterus and ears without discharge   Skin:  Skin color, texture normal and bruising or bleeding. No rashes or lesions over face, neck, arms and legs, turgor normal.   Neurologic:    Neuropsychiatric: Alert but confused, Facial symmetry preserved and upper & lower extremities moving well with symmetry  Calm, normal eye contact, Affect normal     Data   Recent Labs   Lab 08/18/22  0631 " 08/15/22  0736 08/14/22  0608   WBC  --   --  5.5   HGB  --   --  13.1*   MCV  --   --  96    256 259   NA  --   --  142   POTASSIUM  --   --  3.5   CHLORIDE  --   --  110*   CO2  --   --  24   BUN  --   --  19   CR 0.77 0.75 0.78   ANIONGAP  --   --  8   HUNG  --   --  8.3*   GLC  --   --  94

## 2022-08-21 ENCOUNTER — APPOINTMENT (OUTPATIENT)
Dept: PHYSICAL THERAPY | Facility: HOSPITAL | Age: 87
DRG: 177 | End: 2022-08-21
Payer: MEDICARE

## 2022-08-21 LAB
CREAT SERPL-MCNC: 0.74 MG/DL (ref 0.7–1.3)
GFR SERPL CREATININE-BSD FRML MDRD: 89 ML/MIN/1.73M2
PLATELET # BLD AUTO: 341 10E3/UL (ref 150–450)

## 2022-08-21 PROCEDURE — 250N000013 HC RX MED GY IP 250 OP 250 PS 637: Performed by: INTERNAL MEDICINE

## 2022-08-21 PROCEDURE — 97530 THERAPEUTIC ACTIVITIES: CPT | Mod: GP

## 2022-08-21 PROCEDURE — 36415 COLL VENOUS BLD VENIPUNCTURE: CPT | Performed by: INTERNAL MEDICINE

## 2022-08-21 PROCEDURE — 82565 ASSAY OF CREATININE: CPT | Performed by: INTERNAL MEDICINE

## 2022-08-21 PROCEDURE — 250N000011 HC RX IP 250 OP 636: Performed by: INTERNAL MEDICINE

## 2022-08-21 PROCEDURE — 99232 SBSQ HOSP IP/OBS MODERATE 35: CPT | Performed by: INTERNAL MEDICINE

## 2022-08-21 PROCEDURE — 97110 THERAPEUTIC EXERCISES: CPT | Mod: GP

## 2022-08-21 PROCEDURE — 97116 GAIT TRAINING THERAPY: CPT | Mod: GP

## 2022-08-21 PROCEDURE — 85049 AUTOMATED PLATELET COUNT: CPT | Performed by: INTERNAL MEDICINE

## 2022-08-21 PROCEDURE — 120N000001 HC R&B MED SURG/OB

## 2022-08-21 RX ADMIN — ASPIRIN 81 MG CHEWABLE TABLET 81 MG: 81 TABLET CHEWABLE at 20:31

## 2022-08-21 RX ADMIN — CEPHALEXIN 500 MG: 500 CAPSULE ORAL at 20:31

## 2022-08-21 RX ADMIN — LISINOPRIL 20 MG: 20 TABLET ORAL at 20:31

## 2022-08-21 RX ADMIN — ANORECTAL OINTMENT: 15.7; .44; 24; 20.6 OINTMENT TOPICAL at 20:31

## 2022-08-21 RX ADMIN — ANORECTAL OINTMENT: 15.7; .44; 24; 20.6 OINTMENT TOPICAL at 13:40

## 2022-08-21 RX ADMIN — ENOXAPARIN SODIUM 40 MG: 40 INJECTION SUBCUTANEOUS at 17:33

## 2022-08-21 RX ADMIN — Medication 1 MG: at 20:31

## 2022-08-21 RX ADMIN — CEPHALEXIN 500 MG: 500 CAPSULE ORAL at 06:11

## 2022-08-21 RX ADMIN — ROSUVASTATIN CALCIUM 5 MG: 5 TABLET, FILM COATED ORAL at 20:31

## 2022-08-21 RX ADMIN — ANORECTAL OINTMENT: 15.7; .44; 24; 20.6 OINTMENT TOPICAL at 09:05

## 2022-08-21 RX ADMIN — Medication 1000 UNITS: at 20:31

## 2022-08-21 RX ADMIN — CEPHALEXIN 500 MG: 500 CAPSULE ORAL at 13:40

## 2022-08-21 ASSESSMENT — ACTIVITIES OF DAILY LIVING (ADL)
ADLS_ACUITY_SCORE: 29

## 2022-08-21 NOTE — PLAN OF CARE
BP slightly elevated, below PRN parameters, asymptomatic.  Oriented to self and place, calm and cooperative overnight. Cares clustered to promote adequate rest.    Goal Outcome Evaluation:    Problem: Hypertension Comorbidity  Goal: Blood Pressure in Desired Range  Outcome: Ongoing, Progressing  Intervention: Maintain Blood Pressure Management  Recent Flowsheet Documentation  Taken 8/21/2022 0300 by Alyssia Barrientos, RN  Medication Review/Management: medications reviewed     Problem: Fatigue  Goal: Improved Activity Tolerance  Outcome: Ongoing, Progressing  Intervention: Promote Improved Energy  Recent Flowsheet Documentation  Taken 8/21/2022 0300 by Alyssia Barrientos, RN  Activity Management: activity adjusted per tolerance     Problem: Confusion Chronic  Goal: Optimal Cognitive Function  Outcome: Ongoing, Progressing

## 2022-08-21 NOTE — PROGRESS NOTES
[facility]    Medicine Progress Note - Hospitalist Service    Date of Admission:  8/12/2022     Assessment & Plan   SUMMARY:  86 year old male with history of HTN, CABG x4 in 2016, HTN, CAD who presents from senior living facility after a fall, found to have generalized weakness and confusion secondary to COVID.    Active Problems:  COVID-19 infection  -- Asymptomatic, not hypoxic. CRP =1.3  -  Has not received remdesivir or steroids  - Continue Lovenox  --CTA chest abdomen pelvis negative for acute pathology  --Generalized weakness and confusion likely only symptoms and are improving  --Covid recovered on 8/23/2022     Generalized weakness and confusion likely secondary to above, resolving  --No signs of dehydration or sepsis- WBC normal, UA clean   --PT/OT - significant memory dysfunction  --Patient's family requesting higher level of care-care management for placement     2nd Degree Heart Block Type 1  -- Noted sinus arrhythmia with pauses on 8/13 (3-4 seconds)  - Cardiology consult, appreciate input  -- Stopped metoprolol as recommended by cardiology  -- Cardiology recommending Holter as outpatient     Acute metabolic encephalopathy  - Likely related to COVID infection  - Suspect underlying dementia  - CT head and cervical spine reviewed with no acute pathology      S/P Fall  - Unwitnessed, patient does not remember-- related to heart block versus dementia??   - CT head and neck negative for significant acute changes  - ECHO is unremarkable.  Stress reviewed from 2016 with EF of 55%.   Check orthostatics-- OK   Knee x-rays and tibia-fibula x-rays with no fractures or effusions.  - Patient needs placement     Penile wound   - WOC has evaluated  - Calmoseptine ointment     Known CAD status post CABG x4 in 2016   -Stable.   -  Continue aspirin, metoprolol     HTN  -Stable.    - Continue lisinopril     DVT prophylaxis:    Medical:  subcutaneous enoxaparin    Mechanical:  PCD's    Expected  "Discharge Date: 08/23/2022    Discharge Delays: Placement - TCU      Diet: Orders Placed This Encounter      Combination Diet Regular Diet Adult    Joseph Catheter: Not present  Central Lines/Port-a-cath: Not present  Drains: Not present    Principal Problem:    Infection due to 2019 novel coronavirus  Active Problems:    Generalized muscle weakness    Fall, initial encounter    Atrioventricular block, Mobitz type 1, Wenckebach    Metabolic encephalopathy     --------------------------------------------    The patient's care was discussed with the Patient.    Adarsh Cade MD  Hospitalist Service  [facility]  Securely message with the Vocera Web Console (learn more here)  Text page via [employer] Paging/Directory    Clinically Significant Risk Factors Present on Admission            ______________________________________________________________________    Interval History   Patient calm, no longer needs 1:1. Can remember that he is in hospital due to Covid, waiting for discharge bed.     ROS: Denies chest pain, denies shortness of breath, Moving bowels well, passing urine well, slept well and good appetite    Data personally reviewed from the last 24 hours:   Reviewed Laboratory results, Consultant recommendations and medications     Physical Exam   /63 (BP Location: Left arm)   Pulse 83   Temp 98.1  F (36.7  C) (Oral)   Resp 18   Ht 1.778 m (5' 10\")   Wt 83.9 kg (185 lb)   SpO2 96%   BMI 26.54 kg/m      Physical Exam    General Appearance:    HEENT:  Awake, Alert, Cooperative, in no distress and appears stated age   Normocephalic, atraumatic, conjunctiva clear without icterus and ears without discharge   Skin:  Skin color, texture normal and bruising or bleeding. No rashes or lesions over face, neck, arms and legs, turgor normal.   Neurologic:    Neuropsychiatric: Alert but confused, Facial symmetry preserved and upper & lower extremities moving well with symmetry  Calm, normal eye " contact, Affect normal     Data   Recent Labs   Lab 08/21/22  0633 08/18/22  0631 08/15/22  0736    281 256   CR 0.74 0.77 0.75

## 2022-08-21 NOTE — PROGRESS NOTES
Care Management Follow Up    Length of Stay (days): 3    Expected Discharge Date: 08/23/2022     Concerns to be Addressed:   Placement    Patient plan of care discussed at interdisciplinary rounds: Yes    Anticipated Discharge Disposition: Transitional Care     Anticipated Discharge Services:    Anticipated Discharge DME:      Patient/family educated on Medicare website which has current facility and service quality ratings:    Education Provided on the Discharge Plan:    Patient/Family in Agreement with the Plan:      Referrals Placed by CM/SW:    Private pay costs discussed: Not applicable    Additional Information:  Off 1:1 since 8/19 at 1030.    Chart review: pt lives at [facility]. Pt is independent with ADLs. Family reports pt is far from his baseline they reporte his physical and cognitive state are not at baseline.    Per pt JOSE RAUL Henry the top TCU choices are [facility] and [facility]. Referrals sent.          Pilar Klein RN

## 2022-08-21 NOTE — PLAN OF CARE
Goal Outcome Evaluation: Minimal confusion this morning, using his call light appropriate, order his breakfast and lunch with minimal assistance.  The male purewiirma was taken off at 1:50pm, pull ups on, walked to the bathroom with walker and gait belt, with assist of one. Reminded the patient to use the call light button and wait for help before getting up. Covid precaution maintained throughout the shift. Pt asymptomatic.       Problem: Plan of Care - These are the overarching goals to be used throughout the patient stay.    Goal: Absence of Hospital-Acquired Illness or Injury  Intervention: Prevent and Manage VTE (Venous Thromboembolism) Risk  Recent Flowsheet Documentation  Taken 8/21/2022 0900 by Katelynn Beckham RN  Activity Management: activity adjusted per tolerance     Problem: Plan of Care - These are the overarching goals to be used throughout the patient stay.    Goal: Absence of Hospital-Acquired Illness or Injury  Intervention: Prevent Infection  Recent Flowsheet Documentation  Taken 8/21/2022 0900 by Katelynn Beckham RN  Infection Prevention:    hand hygiene promoted    personal protective equipment utilized    single patient room provided     Problem: Plan of Care - These are the overarching goals to be used throughout the patient stay.    Goal: Absence of Hospital-Acquired Illness or Injury  Intervention: Prevent Skin Injury  Recent Flowsheet Documentation  Taken 8/21/2022 0900 by Katelynn Beckham RN  Body Position: position changed independently

## 2022-08-21 NOTE — PLAN OF CARE
Problem: Plan of Care - These are the overarching goals to be used throughout the patient stay.    Goal: Optimal Comfort and Wellbeing  Outcome: Ongoing, Progressing  Intervention: Monitor Pain and Promote Comfort  Recent Flowsheet Documentation  Taken 8/20/2022 2109 by Jelani Ott RN  Pain Management Interventions: medication (see MAR)     A&O to name, place, and partially to situation (understands he has COVID). Calm and cooperative. Not impulsive. Calls for assistance. PrimoFit in place and functioning appropriately. Idnorah urine. Consumed 75% of meal. Encouraging liquids. Consumed 360 ml. Lungs clear/diminished. VSS.

## 2022-08-22 ENCOUNTER — APPOINTMENT (OUTPATIENT)
Dept: OCCUPATIONAL THERAPY | Facility: HOSPITAL | Age: 87
DRG: 177 | End: 2022-08-22
Payer: MEDICARE

## 2022-08-22 PROCEDURE — 250N000013 HC RX MED GY IP 250 OP 250 PS 637: Performed by: INTERNAL MEDICINE

## 2022-08-22 PROCEDURE — G0463 HOSPITAL OUTPT CLINIC VISIT: HCPCS

## 2022-08-22 PROCEDURE — 97530 THERAPEUTIC ACTIVITIES: CPT | Mod: GO

## 2022-08-22 PROCEDURE — 120N000001 HC R&B MED SURG/OB

## 2022-08-22 PROCEDURE — 97535 SELF CARE MNGMENT TRAINING: CPT | Mod: GO

## 2022-08-22 PROCEDURE — 99207 PR CDG-CUT & PASTE-POTENTIAL IMPACT ON LEVEL: CPT | Performed by: INTERNAL MEDICINE

## 2022-08-22 PROCEDURE — 99232 SBSQ HOSP IP/OBS MODERATE 35: CPT | Performed by: INTERNAL MEDICINE

## 2022-08-22 PROCEDURE — 250N000011 HC RX IP 250 OP 636: Performed by: INTERNAL MEDICINE

## 2022-08-22 RX ORDER — ROSUVASTATIN CALCIUM 5 MG/1
5 TABLET, COATED ORAL DAILY
Start: 2022-08-22

## 2022-08-22 RX ADMIN — ENOXAPARIN SODIUM 40 MG: 40 INJECTION SUBCUTANEOUS at 18:23

## 2022-08-22 RX ADMIN — ANORECTAL OINTMENT: 15.7; .44; 24; 20.6 OINTMENT TOPICAL at 14:09

## 2022-08-22 RX ADMIN — ASPIRIN 81 MG CHEWABLE TABLET 81 MG: 81 TABLET CHEWABLE at 20:44

## 2022-08-22 RX ADMIN — LISINOPRIL 20 MG: 20 TABLET ORAL at 20:44

## 2022-08-22 RX ADMIN — ANORECTAL OINTMENT: 15.7; .44; 24; 20.6 OINTMENT TOPICAL at 09:35

## 2022-08-22 RX ADMIN — ROSUVASTATIN CALCIUM 5 MG: 5 TABLET, FILM COATED ORAL at 20:44

## 2022-08-22 RX ADMIN — CEPHALEXIN 500 MG: 500 CAPSULE ORAL at 06:20

## 2022-08-22 RX ADMIN — CEPHALEXIN 500 MG: 500 CAPSULE ORAL at 14:08

## 2022-08-22 RX ADMIN — ANORECTAL OINTMENT: 15.7; .44; 24; 20.6 OINTMENT TOPICAL at 20:46

## 2022-08-22 RX ADMIN — Medication 1000 UNITS: at 20:45

## 2022-08-22 ASSESSMENT — ACTIVITIES OF DAILY LIVING (ADL)
ADLS_ACUITY_SCORE: 29

## 2022-08-22 NOTE — PROGRESS NOTES
Care Management Follow Up    Length of Stay (days): 4    Expected Discharge Date: 08/23/2022     Concerns to be Addressed:    Placement   Patient plan of care discussed at interdisciplinary rounds: Yes    Anticipated Discharge Disposition: Transitional Care     Anticipated Discharge Services:  Therapies and PT/OT  Anticipated Discharge DME:  Per TCU    Patient/family educated on Medicare website which has current facility and service quality ratings:  Yes  Education Provided on the Discharge Plan:  Per team  Patient/Family in Agreement with the Plan:  yes    Referrals Placed by CM/SW:  See below  Private pay costs discussed: Not applicable    Additional Information:  SW reviewed chart and spoke with interdisciplinary team. Per team, pt would be medically cleared for discharge when placement found and MD shares pt would be COVID recovered on 8/23/22. Pt has been off 1:1 since 8/21 @ 1000 per team. PT/OT recommending TCU.     Per chart review, pt resides at [facility] and is independent with ADLs and dependent with iADLs at baseline. MARQUEZ spoke with pt's niece Elaine via phone (P: [phone]) re: discharge planning and family is agreeable with TCU; they would prefer [facility] or [facility]; second choices would be alternate locations in [place] or [place]; third choices would be locations in [place] or [place]. MARQUEZ followed-up on referrals and send additional referrals; SW to update Elaine re: options when known. She declined any other questions at this time. PAS needed. Transport TBD.     TCU Referrals:   - [facility] - 8/22: [facility]  - [facility] - 8/22: reviewing though unlikely to accept as the available bed is at the end of the paez far from the nursing station  - [facility] - 8/22: reviewing  - [facility] - 8/22: LVM  - [facility] - 8/22: LVM  - [facility] - 8/22: reviewing  - [facility]  - 8/22: reviewing    SW will continue to assess and follow for discharge planning needs.     Anival Castellano MSW [facility]  Phone: [phone]    Addendum     SW followed-up on TCU referrals sent, awaiting responses.

## 2022-08-22 NOTE — PLAN OF CARE
RN care plan note from [id number]    Goal Outcome Evaluation:    Problem: Pain Acute  Goal: Acceptable Pain Control and Functional Ability  Outcome: Ongoing, Progressing  Intervention: Prevent or Manage Pain  Recent Flowsheet Documentation  Taken 8/21/2022 1735 by Krystina Watson, RN  Medication Review/Management: medications reviewed  Denies pain.     Problem: Confusion Chronic  Goal: Optimal Cognitive Function  Outcome: Ongoing, Progressing  Answers orientation questions appropriately other than what year it is.     Continues on COVID precautions.

## 2022-08-22 NOTE — PLAN OF CARE
Problem: Plan of Care - These are the overarching goals to be used throughout the patient stay.    Goal: Plan of Care Review/Shift Note  Description: The Plan of Care Review/Shift note should be completed every shift.  The Outcome Evaluation is a brief statement about your assessment that the patient is improving, declining, or no change.  This information will be displayed automatically on your shift note.  Outcome: Ongoing, Progressing   Goal Outcome Evaluation:  LS clear.  Occasional congested cough.  VSS.  Fair appetite.  Pt up in chair most of shift.  Working with PT/OT.  Fair appetite.

## 2022-08-22 NOTE — PLAN OF CARE
Goal Outcome Evaluation:      Problem: Plan of Care - These are the overarching goals to be used throughout the patient stay.    Goal: Optimal Comfort and Wellbeing  Outcome: Ongoing, Progressing  Intervention: Monitor Pain and Promote Comfort  Recent Flowsheet Documentation  Taken 8/22/2022 0037 by Kristen Novoa RN  Pain Management Interventions: rest  Taken 8/21/2022 2031 by Kristen Novoa RN  Pain Management Interventions: rest  Intervention: Provide Person-Centered Care  Recent Flowsheet Documentation  Taken 8/22/2022 0037 by Kristen Novoa RN  Trust Relationship/Rapport:   care explained   choices provided  Taken 8/21/2022 2031 by Kristen Novoa RN  Trust Relationship/Rapport:   care explained   choices provided     Problem: Pain Acute  Goal: Acceptable Pain Control and Functional Ability  Outcome: Ongoing, Progressing  Intervention: Develop Pain Management Plan  Recent Flowsheet Documentation  Taken 8/22/2022 0037 by Kristen Novoa RN  Pain Management Interventions: rest  Taken 8/21/2022 2031 by Kristen Novoa RN  Pain Management Interventions: rest  Intervention: Prevent or Manage Pain  Recent Flowsheet Documentation  Taken 8/22/2022 0037 by Kristen Novoa RN  Sensory Stimulation Regulation:   auditory stimulation minimized   care clustered   lighting decreased   tactile stimulation minimized  Medication Review/Management: medications reviewed  Taken 8/21/2022 2031 by Kristen Novoa RN  Sensory Stimulation Regulation:   auditory stimulation minimized   care clustered   lighting decreased   tactile stimulation minimized  Medication Review/Management: medications reviewed     Problem: Risk for Delirium  Goal: Optimal Coping  Outcome: Ongoing, Progressing  Goal: Improved Behavioral Control  Outcome: Ongoing, Progressing  Intervention: Prevent and Manage Agitation  Recent Flowsheet Documentation  Taken 8/22/2022 0037 by Kristen Novoa RN  Environment Familiarity/Consistency: daily routine followed  Taken  8/21/2022 2031 by Kristen Novoa, RN  Environment Familiarity/Consistency: daily routine followed  Goal: Improved Attention and Thought Clarity  Outcome: Ongoing, Progressing  Intervention: Maximize Cognitive Function  Recent Flowsheet Documentation  Taken 8/22/2022 0037 by Kristen Novoa, RN  Sensory Stimulation Regulation:   auditory stimulation minimized   care clustered   lighting decreased   tactile stimulation minimized  Reorientation Measures: calendar in view  Taken 8/21/2022 2031 by Kristen Novoa RN  Sensory Stimulation Regulation:   auditory stimulation minimized   care clustered   lighting decreased   tactile stimulation minimized  Reorientation Measures: calendar in view  Goal: Improved Sleep  Outcome: Ongoing, Progressing    Patient slept well overnight with no complaints of pain. Continues to have confusion but easily redirectable. Remains safe and free of falls, bed alarm on.

## 2022-08-22 NOTE — PROGRESS NOTES
[facility]  WOC Nurse Inpatient Assessment     Consulted for: penis    Patient History (according to provider note(s):       Patient is a 86 year old male with history significant for HTN, CABG x4 in 2016, HTN, CAD who presents from senior living facility after a fall.  Main complaint of generalized weakness and confusion.  Patient presents here from a senior living facility after an unwitnessed fall.  Apparently patient had family visiting last night for dinner and sometime overnight fell on the floor.  He was found the next morning.  It is unclear when he fell and how long he was down.  Patient does not remember falling.  Complains of some chronic lower extremity pain but no new pains.  Denies any current chest pain, shortness of breath.  Denies any fevers or chills.  Denies diarrhea or constipation or dysuria.  Niece at bedside.  She notes patient has been complaining of generalized weakness and has been a little bit more confused the last few days.  Family is requesting higher level of care once patient is discharged.       Areas Assessed:      Areas visualized during today's visit: Focused: Penis     Skin Injury Location:          Last photo: 8/15/2 at initial assessment   Skin injury due to: irritation  Skin history - not avaialble   and plan of care: to continue ointment Calmoseptin   Affected area:      Skin assessment: Erythema not present     Measurements - n/a      Color: pink     Temperature  normal      Drainage: none, not observed     Color: none      Odor: none  Pain: no complains  Treatment goal: essentially achieved  STATUS: follow up  Supplies ordered: at initial visit         Treatment Plan:     Cleanse area, apply Calmoseptine Rx ointment thin layer, once per day      Orders: Reviewed    RECOMMEND PRIMARY TEAM ORDER: None, at this time  Education provided: hygiene   Discussed plan of care with: Patient and Nurse  WOC nurse follow-up plan: weekly  Notify WOC if wound(s)  deteriorate.  Nursing to notify the Provider(s) and re-consult the [facility] Nurse if new skin concern.    DATA:     Current support surface: Standard  Foam mattress  Containment of urine/stool: male cath in place  BMI: Body mass index is 26.54 kg/m .   Active diet order: Orders Placed This Encounter      Combination Diet Regular Diet Adult     Output: I/O last 3 completed shifts:  In: 840 [P.O.:840]  Out: 500 [Urine:500]     Labs:   Recent Labs   Lab 08/16/22  1517   CRP 1.3*     Pressure injury risk assessment:   Sensory Perception: 3-->slightly limited  Moisture: 3-->occasionally moist  Activity: 3-->walks occasionally  Mobility: 3-->slightly limited  Nutrition: 3-->adequate  Friction and Shear: 2-->potential problem  Rich Score: 17    Haily Barnett RN CWON

## 2022-08-23 ENCOUNTER — APPOINTMENT (OUTPATIENT)
Dept: CARDIOLOGY | Facility: HOSPITAL | Age: 87
DRG: 177 | End: 2022-08-23
Attending: INTERNAL MEDICINE
Payer: MEDICARE

## 2022-08-23 VITALS
DIASTOLIC BLOOD PRESSURE: 77 MMHG | BODY MASS INDEX: 26.48 KG/M2 | RESPIRATION RATE: 20 BRPM | HEART RATE: 72 BPM | HEIGHT: 70 IN | WEIGHT: 185 LBS | TEMPERATURE: 97.6 F | OXYGEN SATURATION: 97 % | SYSTOLIC BLOOD PRESSURE: 169 MMHG

## 2022-08-23 LAB
ANION GAP SERPL CALCULATED.3IONS-SCNC: 7 MMOL/L (ref 5–18)
BUN SERPL-MCNC: 21 MG/DL (ref 8–28)
CALCIUM SERPL-MCNC: 8.7 MG/DL (ref 8.5–10.5)
CHLORIDE BLD-SCNC: 111 MMOL/L (ref 98–107)
CO2 SERPL-SCNC: 23 MMOL/L (ref 22–31)
CREAT SERPL-MCNC: 0.8 MG/DL (ref 0.7–1.3)
GFR SERPL CREATININE-BSD FRML MDRD: 87 ML/MIN/1.73M2
GLUCOSE BLD-MCNC: 102 MG/DL (ref 70–125)
MAGNESIUM SERPL-MCNC: 2.2 MG/DL (ref 1.8–2.6)
POTASSIUM BLD-SCNC: 4.3 MMOL/L (ref 3.5–5)
SODIUM SERPL-SCNC: 141 MMOL/L (ref 136–145)

## 2022-08-23 PROCEDURE — 99239 HOSP IP/OBS DSCHRG MGMT >30: CPT | Mod: CS | Performed by: INTERNAL MEDICINE

## 2022-08-23 PROCEDURE — 36415 COLL VENOUS BLD VENIPUNCTURE: CPT | Performed by: INTERNAL MEDICINE

## 2022-08-23 PROCEDURE — 82310 ASSAY OF CALCIUM: CPT | Performed by: INTERNAL MEDICINE

## 2022-08-23 PROCEDURE — 83735 ASSAY OF MAGNESIUM: CPT | Performed by: INTERNAL MEDICINE

## 2022-08-23 PROCEDURE — 93226 XTRNL ECG REC<48 HR SCAN A/R: CPT

## 2022-08-23 PROCEDURE — 250N000013 HC RX MED GY IP 250 OP 250 PS 637: Performed by: INTERNAL MEDICINE

## 2022-08-23 RX ORDER — CARBOXYMETHYLCELLULOSE SODIUM 5 MG/ML
1 SOLUTION/ DROPS OPHTHALMIC 3 TIMES DAILY
Status: DISCONTINUED | OUTPATIENT
Start: 2022-08-23 | End: 2022-08-23 | Stop reason: HOSPADM

## 2022-08-23 RX ORDER — LISINOPRIL 20 MG/1
20 TABLET ORAL 2 TIMES DAILY
Status: DISCONTINUED | OUTPATIENT
Start: 2022-08-23 | End: 2022-08-23 | Stop reason: HOSPADM

## 2022-08-23 RX ORDER — LISINOPRIL 20 MG/1
20 TABLET ORAL 2 TIMES DAILY
DISCHARGE
Start: 2022-08-23 | End: 2023-12-31

## 2022-08-23 RX ADMIN — Medication 1 DROP: at 13:31

## 2022-08-23 RX ADMIN — LISINOPRIL 20 MG: 20 TABLET ORAL at 13:31

## 2022-08-23 RX ADMIN — ANORECTAL OINTMENT: 15.7; .44; 24; 20.6 OINTMENT TOPICAL at 13:28

## 2022-08-23 ASSESSMENT — ACTIVITIES OF DAILY LIVING (ADL)
ADLS_ACUITY_SCORE: 29

## 2022-08-23 NOTE — PLAN OF CARE
Problem: Confusion Chronic  Goal: Optimal Cognitive Function  Outcome: Ongoing, Progressing     Problem: Risk for Delirium  Goal: Improved Sleep  Outcome: Ongoing, Progressing     Problem: Risk for Delirium  Goal: Improved Attention and Thought Clarity  Outcome: Ongoing, Progressing     Goal Outcome Evaluation: Pt denies any pain. Slept through the night. Slight confusion but easily redirected. VSS. External catheter on and patent. Awaiting placement. No eventful activity.

## 2022-08-23 NOTE — PLAN OF CARE
Physical Therapy Discharge Summary    Reason for therapy discharge:    Discharged to transitional care facility.    Progress towards therapy goal(s). See goals on Care Plan in [facility] electronic health record for goal details.  Goals not met.  Barriers to achieving goals:   limited tolerance for therapy and discharge from facility.    Therapy recommendation(s):    Continued therapy is recommended.  Rationale/Recommendations:  at TCU.

## 2022-08-23 NOTE — PLAN OF CARE
Problem: Plan of Care - These are the overarching goals to be used throughout the patient stay.    Goal: Plan of Care Review/Shift Note  Outcome: Ongoing, Progressing  Problem: Risk for Delirium  Goal: Optimal Coping  Outcome: Ongoing, Progressing     Problem: Confusion Chronic  Goal: Optimal Cognitive Function  Outcome: Ongoing, Progressing  Intervention: Minimize and Manage Confusion  Recent Flowsheet Documentation  Taken 8/22/2022 2058 by Sania Corrigan RN  Sensory Stimulation Regulation: auditory stimulation provided  Reorientation Measures: clock in view  Environment Familiarity/Consistency:    daily routine followed    familiar objects from home provided   Goal Outcome Evaluation:    [id number]..... Pt had an uneventful shift, was up in the chair for 2 hours and has been using his call light appropriately. Pt has an external male catheter and it is functional. Pt is awaiting placement, will continue to monitor.

## 2022-08-23 NOTE — PLAN OF CARE
Occupational Therapy Discharge Summary    Reason for therapy discharge:    Discharged to TCU.    Progress towards therapy goal(s). See goals on Care Plan in [facility] electronic health record for goal details.  Progressing towards goals.    Therapy recommendation(s):    Recommend continued OT @ TCU.

## 2022-08-23 NOTE — PROGRESS NOTES
[facility]    Medicine Progress Note - Hospitalist Service    Date of Admission:  8/12/2022     Assessment & Plan   SUMMARY:  86 year old male with history of HTN, CABG x4 in 2016, HTN, CAD who presents from senior living facility after a fall, found to have generalized weakness and confusion secondary to COVID.    Discharge orders completed.    Active Problems:  COVID-19 infection  -- Asymptomatic, not hypoxic. CRP =1.3  -  Has not received remdesivir or steroids  - Continue Lovenox  --CTA chest abdomen pelvis negative for acute pathology  --Generalized weakness and confusion likely only symptoms and are improving  --Covid recovered on 8/23/2022     Generalized weakness and confusion likely secondary to above, cleared  --No signs of dehydration or sepsis- WBC normal, UA clean   --PT/OT - significant memory dysfunction  --Patient's family requesting higher level of care-care management for placement     2nd Degree Heart Block Type 1  -- Noted sinus arrhythmia with pauses on 8/13 (3-4 seconds)  - Cardiology consult, appreciate input  -- Stopped metoprolol as recommended by cardiology  -- Cardiology recommending Holter as outpatient     Acute metabolic encephalopathy  - Likely related to COVID infection  - Suspect underlying dementia  - CT head and cervical spine reviewed with no acute pathology      S/P Fall  - Unwitnessed, patient does not remember-- related to heart block versus dementia??   - CT head and neck negative for significant acute changes  - ECHO is unremarkable.  Stress reviewed from 2016 with EF of 55%.   Check orthostatics-- OK   Knee x-rays and tibia-fibula x-rays with no fractures or effusions.  - Patient needs placement     Penile wound   - WOC has evaluated  - Calmoseptine ointment     Known CAD status post CABG x4 in 2016   -Stable.   -  Continue aspirin, metoprolol     HTN  -Stable.    - Continue lisinopril     DVT prophylaxis:    Medical:  subcutaneous enoxaparin     "Mechanical:  PCD's    Diet: Orders Placed This Encounter      Combination Diet Regular Diet Adult    Joseph Catheter: Not present  Central Lines/Port-a-cath: Not present  Drains: Not present    Principal Problem:    Infection due to 2019 novel coronavirus  Active Problems:    Generalized muscle weakness    Fall, initial encounter    Atrioventricular block, Mobitz type 1, Wenckebach    Metabolic encephalopathy     --------------------------------------------    The patient's care was discussed with the Patient.    Adarsh Cade MD  Hospitalist Service  [facility]  Securely message with the Vocera Web Console (learn more here)  Text page via [employer] Paging/Directory    Clinically Significant Risk Factors Present on Admission             ______________________________________________________________________    Interval History   Patient feels well; anxious to leave.     ROS: Denies chest pain, denies shortness of breath, Moving bowels well, passing urine well, slept well and good appetite    Data personally reviewed from the last 24 hours:   Reviewed Laboratory results, Consultant recommendations and medications     Physical Exam   /63 (BP Location: Left arm)   Pulse 70   Temp 98.1  F (36.7  C) (Oral)   Resp 19   Ht 1.778 m (5' 10\")   Wt 83.9 kg (185 lb)   SpO2 95%   BMI 26.54 kg/m      Physical Exam    General Appearance:    HEENT:  Awake, Alert, Cooperative, in no distress and appears stated age   Normocephalic, atraumatic, conjunctiva clear without icterus and ears without discharge   Lungs:   Clear to auscultation bilaterally, no wheezing, good air exchange, normal work of breathing   Cardiovascular:  Regular Rate and Rythm, normal apical impulse, normal S1 and S2, no lower extremity edema bilaterally   Abdomen: Soft, non-tender and Non-distended, active bowel sounds   Skin:  Skin color, texture normal and bruising or bleeding. No rashes or lesions over face, neck, arms and legs, " turgor normal.   Neurologic:    Neuropsychiatric: Alert but confused, Facial symmetry preserved and upper & lower extremities moving well with symmetry  Calm, normal eye contact, Affect normal     Data   Recent Labs   Lab 08/21/22  0633 08/18/22  0631    281   CR 0.74 0.77

## 2022-08-23 NOTE — PLAN OF CARE
Problem: Plan of Care - These are the overarching goals to be used throughout the patient stay.    Goal: Optimal Comfort and Wellbeing  Outcome: Adequate for Care Transition     Problem: Confusion Chronic  Goal: Optimal Cognitive Function  Outcome: Adequate for Care Transition     Pt has been call light appropriate throughout day shift.  Pt alert to self place and was able to ask questions about his COVID diagnosis.    Pt ok to discharge to TCU today.  He discharged at 1500 via wheelchair with [facility].  Discharge packet sent with patient.  Pt was fit with holter monitor prior to discharge.

## 2022-08-23 NOTE — DISCHARGE SUMMARY
[facility] MEDICINE  DISCHARGE SUMMARY     Primary Care Physician: Blu Chen  Admission Date: 8/12/2022   Discharge Provider: Leroy KAPADIA MD Discharge Date: 8/23/2022   Diet:   Active Diet and Nourishment Order   Procedures     Combination Diet Regular Diet Adult     Diet       Code Status: No CPR- Do NOT Intubate   Activity: DCACTIVITY: Activity as tolerated        Condition at Discharge: Good     REASON FOR PRESENTATION(See Admission Note for Details)   Fall.  Please refer to H&P for details    PRINCIPAL & ACTIVE DISCHARGE DIAGNOSES     Principal Problem:    Fall, initial encounter  Active Problems:    Generalized muscle weakness    Atrioventricular block, Mobitz type 1, Wenckebach    Infection due to 2019 novel coronavirus    Metabolic encephalopathy      PENDING LABS     Unresulted Labs Ordered in the Past 30 Days of this Admission     No orders found from 7/13/2022 to 8/13/2022.            PROCEDURES ( this hospitalization only)      None    RECOMMENDATIONS TO OUTPATIENT PROVIDER FOR F/U VISIT     Follow-up Appointments     Follow Up and recommended labs and tests      Follow up with Nursing home physician.  No follow up labs or test are   needed.    Patient advised to follow-up with primary cardiologist from [facility] or with Dr. Castellanos from [facility] cardiology clinic   ([phone]) in a week.               DISPOSITION     Skilled Nursing Facility    SUMMARY OF HOSPITAL COURSE:      86 year old male with history of HTN, CABG x4 in 2016, HTN, CAD who brought to ED from [facility] facility for evaluation of fall and admitted for further management.    S/P Fall  --Unwitnessed, patient does not remember  --CT head and neck negative for significant acute changes  --ECHO is unremarkable.  Stress reviewed from 2016 with EF of 55%.   --Check orthostatics-- OK  --Knee x-rays and tibia-fibula x-rays with no fractures or effusions.  -- No recurrence of  fall.  -- Continue PT OT at TCU    COVID-19 test positive on admission on 8/23  -- Asymptomatic, not hypoxic. CRP =1.3  --Has not received remdesivir or steroids  --CTA chest abdomen pelvis negative for acute pathology  --Patient seems at baseline  --Covid recovered on 8/23/2022     Generalized weakness;  Acute metabolic encephalopathy;  Suspect underlying dementia  --CT head and cervical spine negative for acute pathology  --Mentally seems at baseline now  --Continue PT, OT and supportive care at TCU    Heart block with second-degree type I Wenckebach  --Per cardiologist, improved off metoprolol, they recommended 24 hours Holter on discharge, order placed  --Follow-up with primary cardiologist at [facility] or with Dr. Castellanos at [facility], phone #[phone].    History of CAD status post CABG x4 in 2016  Essential hypertension  --Continue home aspirin and lisinopril.  --Home metoprolol discontinued, refer above  -- Blood pressure trending up and home lisinopril increased to twice daily      Discharge Medications with Med changes:     Current Discharge Medication List      START taking these medications    Details   menthol-zinc oxide (CALMOSEPTINE) 0.44-20.6 % OINT ointment Apply topically daily for 10 days    Associated Diagnoses: Open wound of penis without complication, subsequent encounter      rosuvastatin (CRESTOR) 5 MG tablet Take 1 tablet (5 mg) by mouth daily    Associated Diagnoses: Pure hypercholesterolemia         CONTINUE these medications which have NOT CHANGED    Details   aspirin 81 mg chewable tablet [ASPIRIN 81 MG CHEWABLE TABLET] Chew 81 mg daily.      cholecalciferol, vitamin D3, 1,000 unit tablet [CHOLECALCIFEROL, VITAMIN D3, 1,000 UNIT TABLET] Take 1,000 Units by mouth daily.      lisinopril (PRINIVIL,ZESTRIL) 20 MG tablet [LISINOPRIL (PRINIVIL,ZESTRIL) 20 MG TABLET] Take 20 mg by mouth daily.         STOP taking these medications       metoprolol succinate  (TOPROL-XL) 50 MG 24 hr tablet Comments:   Reason for Stopping:                     Rationale for medication changes:      Refer to hospital course        Consults       PHYSICAL THERAPY ADULT IP CONSULT  OCCUPATIONAL THERAPY ADULT IP CONSULT  NUTRITION SERVICES ADULT IP CONSULT  CARE MANAGEMENT / SOCIAL WORK IP CONSULT  WOUND OSTOMY CONTINENCE NURSE  IP CONSULT  OCCUPATIONAL THERAPY ADULT IP CONSULT  CARE MANAGEMENT / SOCIAL WORK IP CONSULT  CARDIOLOGY IP CONSULT  PHYSICAL THERAPY ADULT IP CONSULT  OCCUPATIONAL THERAPY ADULT IP CONSULT    Immunizations given this encounter     Most Recent Immunizations   Administered Date(s) Administered     COVID-19,PF,Moderna 05/02/2022     COVID-19,PF,Pfizer (12+ Yrs) 03/25/2021           Anticoagulation Information          SIGNIFICANT IMAGING FINDINGS     Results for orders placed or performed during the hospital encounter of 08/12/22   Head CT w/o contrast    Impression    IMPRESSION:  HEAD CT:  1.  Moderate volume loss and advanced presumed sequela of chronic microvascular ischemic change.    2.  No finding for intracranial hemorrhage.    3.  Superficial soft tissue contusion/subgaleal hematoma seen along the right parietal scalp.    CERVICAL SPINE CT:  1.  Moderate cervical spondylosis with grade 1 anterolisthesis of C5 on C6 and C6 on C7. Although listhesis is age-indeterminate it is favored to be chronic and degenerative in etiology. No prevertebral soft tissue swelling.    2.  No finding for acute fracture.   Cervical spine CT w/o contrast    Impression    IMPRESSION:  HEAD CT:  1.  Moderate volume loss and advanced presumed sequela of chronic microvascular ischemic change.    2.  No finding for intracranial hemorrhage.    3.  Superficial soft tissue contusion/subgaleal hematoma seen along the right parietal scalp.    CERVICAL SPINE CT:  1.  Moderate cervical spondylosis with grade 1 anterolisthesis of C5 on C6 and C6 on C7. Although listhesis is age-indeterminate it is  favored to be chronic and degenerative in etiology. No prevertebral soft tissue swelling.    2.  No finding for acute fracture.   CTA Chest Abdomen Pelvis w Contrast    Impression    IMPRESSION:  1.  Thoracoabdominal aorta is patent and of normal size and appearance. No evidence of dissection or injury. No active bleed in the chest, abdomen or pelvis.     XR Knee Left 1/2 Views    Impression    IMPRESSION: No fractures are evident. No knee joint effusion. Normal joint spacing. Surgical clips in the soft tissues.   XR Tibia and Fibula Left 2 Views    Impression    IMPRESSION: No fractures are identified. Surgical clips in the medial soft tissues. Atheromatous calcification.   Echocardiogram Complete   Result Value Ref Range    LVEF  55-60%        SIGNIFICANT LABORATORY FINDINGS     Most Recent 3 CBC's:Recent Labs   Lab Test 08/21/22  0633 08/18/22  0631 08/15/22  0736 08/14/22  0608 08/13/22  0634 08/12/22  0938   WBC  --   --   --  5.5 7.6 7.6   HGB  --   --   --  13.1* 14.2 14.0   MCV  --   --   --  96 98 97    281 256 259 293 303     Most Recent 3 BMP's:Recent Labs   Lab Test 08/23/22  0738 08/21/22  0633 08/18/22  0631 08/15/22  0736 08/14/22  0608 08/13/22  0634     --   --   --  142 139   POTASSIUM 4.3  --   --   --  3.5 3.7   CHLORIDE 111*  --   --   --  110* 104   CO2 23  --   --   --  24 26   BUN 21  --   --   --  19 17   CR 0.80 0.74 0.77   < > 0.78 0.88   ANIONGAP 7  --   --   --  8 9   HUNG 8.7  --   --   --  8.3* 9.0     --   --   --  94 96    < > = values in this interval not displayed.       Discharge Orders        General info for SNF    Length of Stay Estimate: Short Term Care: Estimated # of Days <30  Condition at Discharge: Improving  Level of care:skilled   Rehabilitation Potential: fair  Admission H&P remains valid and up-to-date: Yes  Recent Chemotherapy: N/A  Use Nursing Home Standing Orders: Yes     Mantoux instructions    Give two-step Mantoux (PPD) Per Facility Policy Yes      Reason for your hospital stay    Confusion, weakness, covid-19 positive     Activity - Up with assistive device     Follow Up and recommended labs and tests    Follow up with Nursing home physician.  No follow up labs or test are needed.    Patient advised to follow-up with primary cardiologist from [facility] or with Dr. Castellanos from [facility] cardiology clinic ([phone]) in a week.     No CPR- Do NOT Intubate     Physical Therapy Adult Consult    Evaluate and treat as clinically indicated.    Reason:  weakness     Occupational Therapy Adult Consult    Evaluate and treat as clinically indicated.    Reason:  weakness     Fall precautions     Diet    Regular Diet Adult       Examination   Physical Exam   Temp:  [97.6  F (36.4  C)-98.1  F (36.7  C)] 97.6  F (36.4  C)  Pulse:  [70-74] 72  Resp:  [18-20] 20  BP: (135-169)/(63-77) 169/77  SpO2:  [95 %-97 %] 97 %  Wt Readings from Last 1 Encounters:   08/12/22 83.9 kg (185 lb)       Patient seen and examined by this examiner for the first time on the day of discharge.  Patient sitting in a chair during my visit.  Patient seems to have good appetite at his breakfast plate was empty.  Patient denied dizzy or lightheadedness.  Denied short of breath or chest pain.  Denied abdomen pain, nausea, vomiting.  Discussed with nursing staffs.  Discussed with care manager/.  Discussed with patient POA about plan of care after discharge.      General: Not in obvious distress.  HEENT: Normocephalic, supple neck  Chest: Clear to auscultation bilateral anteriorly, no wheezing  Heart: S1S2 normal, regular  Abdomen: Soft. NT, ND. Bowel sounds- active.  Extremities: No legs swelling  Neuro: alert and awake, grossly non-focal        Please see EMR for more detailed significant labs, imaging, consultant notes etc.    ILeroy MD, personally saw the patient today and spent greater than 30 minutes discharging this patient.    Leroy KAPADIA MD  [facility]  [facility]    CC:Blu Chen

## 2022-08-23 NOTE — PROGRESS NOTES
Care Management Discharge Note    Discharge Date: 08/23/2022       Discharge Disposition: Transitional Care ([facility])    Discharge Services:  Per facility    Discharge DME:  None    Discharge Transportation:  [employer] at 1500    Private pay costs discussed: transportation costs    PAS Confirmation Code:  [id number])  Patient/family educated on Medicare website which has current facility and service quality ratings:  yes    Education Provided on the Discharge Plan:  yes  Persons Notified of Discharge Plans: yes  Patient/Family in Agreement with the Plan:  yes    Handoff Referral Completed: Yes    Additional Information:  Plan is for pt to transfer to [facility] for strength before going back to his [facility].  [employer]  transport set up for 1500. POA and pt in agreement with plan. No further CM needs at this time.        Pilar Klein RN

## 2022-08-24 ENCOUNTER — TELEPHONE (OUTPATIENT)
Dept: CARDIOLOGY | Facility: CLINIC | Age: 87
End: 2022-08-24

## 2022-08-24 ENCOUNTER — PATIENT OUTREACH (OUTPATIENT)
Dept: CARE COORDINATION | Facility: CLINIC | Age: 87
End: 2022-08-24

## 2022-08-24 ENCOUNTER — LAB REQUISITION (OUTPATIENT)
Dept: LAB | Facility: CLINIC | Age: 87
End: 2022-08-24
Payer: MEDICARE

## 2022-08-24 DIAGNOSIS — I10 ESSENTIAL (PRIMARY) HYPERTENSION: ICD-10-CM

## 2022-08-24 NOTE — TELEPHONE ENCOUNTER
[facility] Call Center    Phone Message    May a detailed message be left on voicemail: yes     Reason for Call: Other: Call Ro at [phone] [facility]. Patient was instructed to see Dr Castellanos next week. Nothing is available, Should patient see BLUE? How should patient return holter monitor as he relies  on assistance to arrange appointments.     Action Taken: Other: routed to cardiology    Travel Screening: Not Applicable

## 2022-08-24 NOTE — TELEPHONE ENCOUNTER
JERRY Brock from [facility] stated someone removed pt's telemetery monitor and they aren't sure where it came from, so it can be properly returned.      Please call [phone] to help get it to where it needs to go.

## 2022-08-24 NOTE — PROGRESS NOTES
[facility]    Background: Transitional Care Management program auto-identified and prompting a chart review by [facility] Resource Center team.    Assessment: Upon chart review, [facility] Team member will cancel/close this episode of Transitional Care Management program due to reason below:    Patient has discharged to a Memory Care, Nursing Home, Assisted Living or Group Home where patient is receiving on-site support with their daily cares, including support with hospital follow up plan.    Plan: Transitional Care Management episode closed per reason above.      Shama Pearson MA  Connected Care Resource [facility]    *Connected Care Resource Team does NOT follow patient ongoing. Referrals are identified based on internal discharge reports and the outreach is to ensure patient has an understanding of their discharge instructions.

## 2022-08-25 ENCOUNTER — TELEPHONE (OUTPATIENT)
Dept: CARDIOLOGY | Facility: CLINIC | Age: 87
End: 2022-08-25

## 2022-08-25 NOTE — TELEPHONE ENCOUNTER
[facility] called - provided information on where to return holter monitor.  As far as appt with Dr Castellanos - she will check to see if pt has cardiologist at [facility] per hospital discharge summary, and if not will call back for appt with Dr Castellanos.  -[place]

## 2022-08-25 NOTE — TELEPHONE ENCOUNTER
[facility] Call Center    Phone Message    May a detailed message be left on voicemail: yes     Reason for Call: Other: Ro called from [facility] wanting to know where to send pts holter monitor to. Please review and call Ro back to discuss. Thank you!     Action Taken: Other: Cardiology    Travel Screening: Not Applicable

## 2022-08-26 ENCOUNTER — LAB REQUISITION (OUTPATIENT)
Dept: LAB | Facility: CLINIC | Age: 87
End: 2022-08-26
Payer: MEDICARE

## 2022-08-26 DIAGNOSIS — I10 ESSENTIAL (PRIMARY) HYPERTENSION: ICD-10-CM

## 2022-08-26 LAB
ANION GAP SERPL CALCULATED.3IONS-SCNC: 10 MMOL/L (ref 7–15)
BUN SERPL-MCNC: 16.4 MG/DL (ref 8–23)
CALCIUM SERPL-MCNC: 9 MG/DL (ref 8.8–10.2)
CHLORIDE SERPL-SCNC: 108 MMOL/L (ref 98–107)
CREAT SERPL-MCNC: 0.81 MG/DL (ref 0.67–1.17)
DEPRECATED HCO3 PLAS-SCNC: 22 MMOL/L (ref 22–29)
GFR SERPL CREATININE-BSD FRML MDRD: 86 ML/MIN/1.73M2
GLUCOSE SERPL-MCNC: 119 MG/DL (ref 70–99)
POTASSIUM SERPL-SCNC: 4 MMOL/L (ref 3.4–5.3)
SODIUM SERPL-SCNC: 140 MMOL/L (ref 136–145)

## 2022-08-26 PROCEDURE — P9604 ONE-WAY ALLOW PRORATED TRIP: HCPCS | Performed by: INTERNAL MEDICINE

## 2022-08-26 PROCEDURE — 82374 ASSAY BLOOD CARBON DIOXIDE: CPT | Performed by: INTERNAL MEDICINE

## 2022-08-26 PROCEDURE — 36415 COLL VENOUS BLD VENIPUNCTURE: CPT | Performed by: INTERNAL MEDICINE

## 2022-08-29 LAB
ERYTHROCYTE [DISTWIDTH] IN BLOOD BY AUTOMATED COUNT: 12.6 % (ref 10–15)
HCT VFR BLD AUTO: 38.9 % (ref 40–53)
HGB BLD-MCNC: 12.5 G/DL (ref 13.3–17.7)
MCH RBC QN AUTO: 31.6 PG (ref 26.5–33)
MCHC RBC AUTO-ENTMCNC: 32.1 G/DL (ref 31.5–36.5)
MCV RBC AUTO: 98 FL (ref 78–100)
PLATELET # BLD AUTO: 424 10E3/UL (ref 150–450)
RBC # BLD AUTO: 3.96 10E6/UL (ref 4.4–5.9)
WBC # BLD AUTO: 12.5 10E3/UL (ref 4–11)

## 2022-08-29 PROCEDURE — 85041 AUTOMATED RBC COUNT: CPT | Performed by: INTERNAL MEDICINE

## 2022-08-29 PROCEDURE — 85027 COMPLETE CBC AUTOMATED: CPT | Performed by: INTERNAL MEDICINE

## 2022-08-29 PROCEDURE — 36415 COLL VENOUS BLD VENIPUNCTURE: CPT | Performed by: INTERNAL MEDICINE

## 2022-08-29 PROCEDURE — P9604 ONE-WAY ALLOW PRORATED TRIP: HCPCS | Performed by: INTERNAL MEDICINE

## 2022-08-30 ENCOUNTER — LAB REQUISITION (OUTPATIENT)
Dept: LAB | Facility: CLINIC | Age: 87
End: 2022-08-30

## 2022-08-30 DIAGNOSIS — N39.0 URINARY TRACT INFECTION, SITE NOT SPECIFIED: ICD-10-CM

## 2022-08-30 LAB
ALBUMIN UR-MCNC: NEGATIVE MG/DL
APPEARANCE UR: CLEAR
BILIRUB UR QL STRIP: NEGATIVE
COLOR UR AUTO: ABNORMAL
GLUCOSE UR STRIP-MCNC: NEGATIVE MG/DL
HGB UR QL STRIP: NEGATIVE
KETONES UR STRIP-MCNC: ABNORMAL MG/DL
LEUKOCYTE ESTERASE UR QL STRIP: NEGATIVE
MUCOUS THREADS #/AREA URNS LPF: PRESENT /LPF
NITRATE UR QL: NEGATIVE
PH UR STRIP: 5.5 [PH] (ref 5–7)
RBC URINE: 0 /HPF
SP GR UR STRIP: 1.02 (ref 1–1.03)
SQUAMOUS EPITHELIAL: <1 /HPF
UROBILINOGEN UR STRIP-MCNC: 2 MG/DL
WBC URINE: <1 /HPF

## 2022-08-30 PROCEDURE — 87086 URINE CULTURE/COLONY COUNT: CPT | Performed by: INTERNAL MEDICINE

## 2022-08-30 PROCEDURE — 81001 URINALYSIS AUTO W/SCOPE: CPT | Performed by: INTERNAL MEDICINE

## 2022-09-02 LAB
BACTERIA UR CULT: ABNORMAL
BACTERIA UR CULT: ABNORMAL

## 2022-09-06 ENCOUNTER — LAB REQUISITION (OUTPATIENT)
Dept: LAB | Facility: CLINIC | Age: 87
End: 2022-09-06

## 2022-09-06 DIAGNOSIS — D64.9 ANEMIA, UNSPECIFIED: ICD-10-CM

## 2022-09-06 LAB
FERRITIN SERPL-MCNC: 419 NG/ML (ref 31–409)
IRON SATN MFR SERPL: 30 % (ref 15–46)
IRON SERPL-MCNC: 55 UG/DL (ref 35–180)
TIBC SERPL-MCNC: 186 UG/DL (ref 240–430)
TSH SERPL DL<=0.005 MIU/L-ACNC: 1.11 UIU/ML (ref 0.3–4.2)

## 2022-09-06 PROCEDURE — 82728 ASSAY OF FERRITIN: CPT | Performed by: INTERNAL MEDICINE

## 2022-09-06 PROCEDURE — 84443 ASSAY THYROID STIM HORMONE: CPT | Performed by: INTERNAL MEDICINE

## 2022-09-06 PROCEDURE — P9604 ONE-WAY ALLOW PRORATED TRIP: HCPCS | Performed by: INTERNAL MEDICINE

## 2022-09-06 PROCEDURE — 83550 IRON BINDING TEST: CPT | Performed by: INTERNAL MEDICINE

## 2022-09-06 PROCEDURE — 36415 COLL VENOUS BLD VENIPUNCTURE: CPT | Performed by: INTERNAL MEDICINE

## 2022-09-17 ENCOUNTER — LAB REQUISITION (OUTPATIENT)
Dept: LAB | Facility: CLINIC | Age: 87
End: 2022-09-17

## 2022-09-17 DIAGNOSIS — N39.0 URINARY TRACT INFECTION, SITE NOT SPECIFIED: ICD-10-CM

## 2022-09-17 LAB
ALBUMIN UR-MCNC: NEGATIVE MG/DL
APPEARANCE UR: CLEAR
BILIRUB UR QL STRIP: NEGATIVE
COLOR UR AUTO: NORMAL
GLUCOSE UR STRIP-MCNC: NEGATIVE MG/DL
HGB UR QL STRIP: NEGATIVE
KETONES UR STRIP-MCNC: NEGATIVE MG/DL
LEUKOCYTE ESTERASE UR QL STRIP: NEGATIVE
NITRATE UR QL: NEGATIVE
PH UR STRIP: 5 [PH] (ref 5–7)
RBC URINE: <1 /HPF
SP GR UR STRIP: 1.02 (ref 1–1.03)
UROBILINOGEN UR STRIP-MCNC: NORMAL MG/DL
WBC URINE: 2 /HPF

## 2022-09-17 PROCEDURE — 81003 URINALYSIS AUTO W/O SCOPE: CPT | Performed by: NURSE PRACTITIONER

## 2022-09-17 PROCEDURE — 87086 URINE CULTURE/COLONY COUNT: CPT | Performed by: NURSE PRACTITIONER

## 2022-09-18 LAB — BACTERIA UR CULT: NORMAL

## 2022-11-19 ENCOUNTER — LAB REQUISITION (OUTPATIENT)
Dept: LAB | Facility: CLINIC | Age: 87
End: 2022-11-19
Payer: MEDICARE

## 2022-11-19 DIAGNOSIS — E78.5 HYPERLIPIDEMIA, UNSPECIFIED: ICD-10-CM

## 2022-11-19 DIAGNOSIS — I50.9 HEART FAILURE, UNSPECIFIED (H): ICD-10-CM

## 2022-11-19 DIAGNOSIS — R73.03 PREDIABETES: ICD-10-CM

## 2022-11-19 DIAGNOSIS — F03.90 UNSPECIFIED DEMENTIA, UNSPECIFIED SEVERITY, WITHOUT BEHAVIORAL DISTURBANCE, PSYCHOTIC DISTURBANCE, MOOD DISTURBANCE, AND ANXIETY (H): ICD-10-CM

## 2022-11-21 LAB
ALBUMIN SERPL BCG-MCNC: 3.5 G/DL (ref 3.5–5.2)
ALP SERPL-CCNC: 107 U/L (ref 40–129)
ALT SERPL W P-5'-P-CCNC: 8 U/L (ref 10–50)
ANION GAP SERPL CALCULATED.3IONS-SCNC: 13 MMOL/L (ref 7–15)
AST SERPL W P-5'-P-CCNC: 20 U/L (ref 10–50)
BILIRUB SERPL-MCNC: 0.2 MG/DL
BUN SERPL-MCNC: 17.1 MG/DL (ref 8–23)
CALCIUM SERPL-MCNC: 9 MG/DL (ref 8.8–10.2)
CHLORIDE SERPL-SCNC: 111 MMOL/L (ref 98–107)
CHOLEST SERPL-MCNC: 122 MG/DL
CREAT SERPL-MCNC: 0.88 MG/DL (ref 0.67–1.17)
DEPRECATED HCO3 PLAS-SCNC: 20 MMOL/L (ref 22–29)
ERYTHROCYTE [DISTWIDTH] IN BLOOD BY AUTOMATED COUNT: 13.8 % (ref 10–15)
GFR SERPL CREATININE-BSD FRML MDRD: 84 ML/MIN/1.73M2
GLUCOSE SERPL-MCNC: 95 MG/DL (ref 70–99)
HBA1C MFR BLD: 5.7 %
HCT VFR BLD AUTO: 34.3 % (ref 40–53)
HDLC SERPL-MCNC: 54 MG/DL
HGB BLD-MCNC: 10.8 G/DL (ref 13.3–17.7)
LDLC SERPL CALC-MCNC: 47 MG/DL
MCH RBC QN AUTO: 31.7 PG (ref 26.5–33)
MCHC RBC AUTO-ENTMCNC: 31.5 G/DL (ref 31.5–36.5)
MCV RBC AUTO: 101 FL (ref 78–100)
NONHDLC SERPL-MCNC: 68 MG/DL
NT-PROBNP SERPL-MCNC: 998 PG/ML (ref 0–1800)
PLATELET # BLD AUTO: 351 10E3/UL (ref 150–450)
POTASSIUM SERPL-SCNC: 3.6 MMOL/L (ref 3.4–5.3)
PROT SERPL-MCNC: 6 G/DL (ref 6.4–8.3)
RBC # BLD AUTO: 3.41 10E6/UL (ref 4.4–5.9)
SODIUM SERPL-SCNC: 144 MMOL/L (ref 136–145)
TRIGL SERPL-MCNC: 103 MG/DL
TSH SERPL DL<=0.005 MIU/L-ACNC: 0.79 UIU/ML (ref 0.3–4.2)
VIT B12 SERPL-MCNC: 339 PG/ML (ref 232–1245)
WBC # BLD AUTO: 11 10E3/UL (ref 4–11)

## 2022-11-21 PROCEDURE — 85027 COMPLETE CBC AUTOMATED: CPT | Mod: ORL | Performed by: INTERNAL MEDICINE

## 2022-11-21 PROCEDURE — P9604 ONE-WAY ALLOW PRORATED TRIP: HCPCS | Mod: ORL | Performed by: INTERNAL MEDICINE

## 2022-11-21 PROCEDURE — 36415 COLL VENOUS BLD VENIPUNCTURE: CPT | Mod: ORL | Performed by: INTERNAL MEDICINE

## 2022-11-21 PROCEDURE — 80053 COMPREHEN METABOLIC PANEL: CPT | Mod: ORL | Performed by: INTERNAL MEDICINE

## 2022-11-21 PROCEDURE — 84443 ASSAY THYROID STIM HORMONE: CPT | Mod: ORL | Performed by: INTERNAL MEDICINE

## 2022-11-21 PROCEDURE — 80061 LIPID PANEL: CPT | Mod: ORL | Performed by: INTERNAL MEDICINE

## 2022-11-21 PROCEDURE — 82607 VITAMIN B-12: CPT | Mod: ORL | Performed by: INTERNAL MEDICINE

## 2022-11-21 PROCEDURE — 83036 HEMOGLOBIN GLYCOSYLATED A1C: CPT | Mod: ORL | Performed by: INTERNAL MEDICINE

## 2022-11-21 PROCEDURE — 83880 ASSAY OF NATRIURETIC PEPTIDE: CPT | Mod: ORL | Performed by: INTERNAL MEDICINE

## 2022-12-16 ENCOUNTER — LAB REQUISITION (OUTPATIENT)
Dept: LAB | Facility: CLINIC | Age: 87
End: 2022-12-16
Payer: MEDICARE

## 2022-12-16 DIAGNOSIS — D64.9 ANEMIA, UNSPECIFIED: ICD-10-CM

## 2022-12-19 LAB
ERYTHROCYTE [DISTWIDTH] IN BLOOD BY AUTOMATED COUNT: 12.7 % (ref 10–15)
FERRITIN SERPL-MCNC: 248 NG/ML (ref 31–409)
FOLATE SERPL-MCNC: 10.8 NG/ML (ref 4.6–34.8)
HCT VFR BLD AUTO: 39.9 % (ref 40–53)
HGB BLD-MCNC: 12.3 G/DL (ref 13.3–17.7)
IRON BINDING CAPACITY (ROCHE): 239 UG/DL (ref 240–430)
IRON SATN MFR SERPL: 27 % (ref 15–46)
IRON SERPL-MCNC: 64 UG/DL (ref 61–157)
MCH RBC QN AUTO: 31.1 PG (ref 26.5–33)
MCHC RBC AUTO-ENTMCNC: 30.8 G/DL (ref 31.5–36.5)
MCV RBC AUTO: 101 FL (ref 78–100)
PLATELET # BLD AUTO: 352 10E3/UL (ref 150–450)
RBC # BLD AUTO: 3.95 10E6/UL (ref 4.4–5.9)
VIT B12 SERPL-MCNC: 400 PG/ML (ref 232–1245)
WBC # BLD AUTO: 10.3 10E3/UL (ref 4–11)

## 2022-12-19 PROCEDURE — 36415 COLL VENOUS BLD VENIPUNCTURE: CPT | Mod: ORL | Performed by: INTERNAL MEDICINE

## 2022-12-19 PROCEDURE — 83550 IRON BINDING TEST: CPT | Mod: ORL | Performed by: INTERNAL MEDICINE

## 2022-12-19 PROCEDURE — 82728 ASSAY OF FERRITIN: CPT | Mod: ORL | Performed by: INTERNAL MEDICINE

## 2022-12-19 PROCEDURE — 82607 VITAMIN B-12: CPT | Mod: ORL | Performed by: INTERNAL MEDICINE

## 2022-12-19 PROCEDURE — P9604 ONE-WAY ALLOW PRORATED TRIP: HCPCS | Mod: ORL | Performed by: INTERNAL MEDICINE

## 2022-12-19 PROCEDURE — 82746 ASSAY OF FOLIC ACID SERUM: CPT | Mod: ORL | Performed by: INTERNAL MEDICINE

## 2022-12-19 PROCEDURE — 85027 COMPLETE CBC AUTOMATED: CPT | Mod: ORL | Performed by: INTERNAL MEDICINE

## 2022-12-29 PROCEDURE — 87086 URINE CULTURE/COLONY COUNT: CPT | Mod: ORL | Performed by: INTERNAL MEDICINE

## 2022-12-29 PROCEDURE — 81001 URINALYSIS AUTO W/SCOPE: CPT | Mod: ORL | Performed by: INTERNAL MEDICINE

## 2022-12-30 ENCOUNTER — LAB REQUISITION (OUTPATIENT)
Dept: LAB | Facility: CLINIC | Age: 87
End: 2022-12-30
Payer: MEDICARE

## 2022-12-30 DIAGNOSIS — R30.0 DYSURIA: ICD-10-CM

## 2022-12-30 LAB
ALBUMIN UR-MCNC: 70 MG/DL
APPEARANCE UR: ABNORMAL
BACTERIA #/AREA URNS HPF: ABNORMAL /HPF
BILIRUB UR QL STRIP: NEGATIVE
CAOX CRY #/AREA URNS HPF: ABNORMAL /HPF
COLOR UR AUTO: YELLOW
GLUCOSE UR STRIP-MCNC: NEGATIVE MG/DL
HGB UR QL STRIP: NEGATIVE
HYALINE CASTS: 7 /LPF
KETONES UR STRIP-MCNC: ABNORMAL MG/DL
LEUKOCYTE ESTERASE UR QL STRIP: NEGATIVE
MUCOUS THREADS #/AREA URNS LPF: PRESENT /LPF
NITRATE UR QL: NEGATIVE
PH UR STRIP: 5.5 [PH] (ref 5–7)
RBC URINE: 5 /HPF
SP GR UR STRIP: 1.03 (ref 1–1.03)
UROBILINOGEN UR STRIP-MCNC: NORMAL MG/DL
WBC URINE: 5 /HPF

## 2022-12-31 LAB — BACTERIA UR CULT: NORMAL

## 2023-02-02 ENCOUNTER — LAB REQUISITION (OUTPATIENT)
Dept: LAB | Facility: CLINIC | Age: 88
End: 2023-02-02
Payer: MEDICARE

## 2023-02-02 DIAGNOSIS — I10 ESSENTIAL (PRIMARY) HYPERTENSION: ICD-10-CM

## 2023-02-06 LAB
ALBUMIN SERPL BCG-MCNC: 4 G/DL (ref 3.5–5.2)
ALP SERPL-CCNC: 104 U/L (ref 40–129)
ALT SERPL W P-5'-P-CCNC: 10 U/L (ref 10–50)
ANION GAP SERPL CALCULATED.3IONS-SCNC: 14 MMOL/L (ref 7–15)
AST SERPL W P-5'-P-CCNC: 20 U/L (ref 10–50)
BILIRUB SERPL-MCNC: 0.3 MG/DL
BUN SERPL-MCNC: 17.5 MG/DL (ref 8–23)
CALCIUM SERPL-MCNC: 9.1 MG/DL (ref 8.8–10.2)
CHLORIDE SERPL-SCNC: 109 MMOL/L (ref 98–107)
CREAT SERPL-MCNC: 0.81 MG/DL (ref 0.67–1.17)
DEPRECATED HCO3 PLAS-SCNC: 22 MMOL/L (ref 22–29)
GFR SERPL CREATININE-BSD FRML MDRD: 86 ML/MIN/1.73M2
GLUCOSE SERPL-MCNC: 146 MG/DL (ref 70–99)
POTASSIUM SERPL-SCNC: 3.2 MMOL/L (ref 3.4–5.3)
PROT SERPL-MCNC: 6.4 G/DL (ref 6.4–8.3)
SODIUM SERPL-SCNC: 145 MMOL/L (ref 136–145)

## 2023-02-06 PROCEDURE — 80053 COMPREHEN METABOLIC PANEL: CPT | Mod: ORL | Performed by: INTERNAL MEDICINE

## 2023-02-06 PROCEDURE — 36415 COLL VENOUS BLD VENIPUNCTURE: CPT | Mod: ORL | Performed by: INTERNAL MEDICINE

## 2023-02-06 PROCEDURE — P9604 ONE-WAY ALLOW PRORATED TRIP: HCPCS | Mod: ORL | Performed by: INTERNAL MEDICINE

## 2023-02-09 ENCOUNTER — LAB REQUISITION (OUTPATIENT)
Dept: LAB | Facility: CLINIC | Age: 88
End: 2023-02-09
Payer: MEDICARE

## 2023-02-09 DIAGNOSIS — I10 ESSENTIAL (PRIMARY) HYPERTENSION: ICD-10-CM

## 2023-02-17 ENCOUNTER — LAB REQUISITION (OUTPATIENT)
Dept: LAB | Facility: CLINIC | Age: 88
End: 2023-02-17
Payer: MEDICARE

## 2023-02-17 DIAGNOSIS — I10 ESSENTIAL (PRIMARY) HYPERTENSION: ICD-10-CM

## 2023-02-20 LAB
ANION GAP SERPL CALCULATED.3IONS-SCNC: 13 MMOL/L (ref 7–15)
BUN SERPL-MCNC: 19.9 MG/DL (ref 8–23)
CALCIUM SERPL-MCNC: 9.4 MG/DL (ref 8.8–10.2)
CHLORIDE SERPL-SCNC: 107 MMOL/L (ref 98–107)
CREAT SERPL-MCNC: 0.85 MG/DL (ref 0.67–1.17)
DEPRECATED HCO3 PLAS-SCNC: 22 MMOL/L (ref 22–29)
GFR SERPL CREATININE-BSD FRML MDRD: 85 ML/MIN/1.73M2
GLUCOSE SERPL-MCNC: 134 MG/DL (ref 70–99)
POTASSIUM SERPL-SCNC: 4 MMOL/L (ref 3.4–5.3)
SODIUM SERPL-SCNC: 142 MMOL/L (ref 136–145)

## 2023-02-20 PROCEDURE — 80048 BASIC METABOLIC PNL TOTAL CA: CPT | Mod: ORL | Performed by: INTERNAL MEDICINE

## 2023-02-20 PROCEDURE — P9604 ONE-WAY ALLOW PRORATED TRIP: HCPCS | Mod: ORL | Performed by: INTERNAL MEDICINE

## 2023-02-20 PROCEDURE — 36415 COLL VENOUS BLD VENIPUNCTURE: CPT | Mod: ORL | Performed by: INTERNAL MEDICINE

## 2023-03-25 ENCOUNTER — LAB REQUISITION (OUTPATIENT)
Dept: LAB | Facility: CLINIC | Age: 88
End: 2023-03-25
Payer: MEDICARE

## 2023-03-25 DIAGNOSIS — R30.0 DYSURIA: ICD-10-CM

## 2023-03-25 LAB
ALBUMIN UR-MCNC: NEGATIVE MG/DL
APPEARANCE UR: CLEAR
BILIRUB UR QL STRIP: NEGATIVE
COLOR UR AUTO: ABNORMAL
GLUCOSE UR STRIP-MCNC: NEGATIVE MG/DL
HGB UR QL STRIP: NEGATIVE
HYALINE CASTS: 1 /LPF
KETONES UR STRIP-MCNC: NEGATIVE MG/DL
LEUKOCYTE ESTERASE UR QL STRIP: NEGATIVE
MUCOUS THREADS #/AREA URNS LPF: PRESENT /LPF
NITRATE UR QL: NEGATIVE
PH UR STRIP: 5 [PH] (ref 5–7)
RBC URINE: <1 /HPF
SP GR UR STRIP: 1.02 (ref 1–1.03)
UROBILINOGEN UR STRIP-MCNC: NORMAL MG/DL
WBC URINE: 1 /HPF

## 2023-03-25 PROCEDURE — 87086 URINE CULTURE/COLONY COUNT: CPT | Mod: ORL | Performed by: INTERNAL MEDICINE

## 2023-03-25 PROCEDURE — 81001 URINALYSIS AUTO W/SCOPE: CPT | Mod: ORL | Performed by: INTERNAL MEDICINE

## 2023-03-26 LAB — BACTERIA UR CULT: NO GROWTH

## 2023-06-02 ENCOUNTER — LAB REQUISITION (OUTPATIENT)
Dept: LAB | Facility: CLINIC | Age: 88
End: 2023-06-02
Payer: MEDICARE

## 2023-06-02 DIAGNOSIS — F03.90 UNSPECIFIED DEMENTIA, UNSPECIFIED SEVERITY, WITHOUT BEHAVIORAL DISTURBANCE, PSYCHOTIC DISTURBANCE, MOOD DISTURBANCE, AND ANXIETY (H): ICD-10-CM

## 2023-06-02 PROCEDURE — 81001 URINALYSIS AUTO W/SCOPE: CPT | Mod: ORL

## 2023-06-02 PROCEDURE — 87086 URINE CULTURE/COLONY COUNT: CPT | Mod: ORL

## 2023-06-05 LAB
ALBUMIN SERPL BCG-MCNC: 3.9 G/DL (ref 3.5–5.2)
ALP SERPL-CCNC: 107 U/L (ref 40–129)
ALT SERPL W P-5'-P-CCNC: 11 U/L (ref 10–50)
ANION GAP SERPL CALCULATED.3IONS-SCNC: 13 MMOL/L (ref 7–15)
AST SERPL W P-5'-P-CCNC: 19 U/L (ref 10–50)
BILIRUB SERPL-MCNC: 0.2 MG/DL
BUN SERPL-MCNC: 20.9 MG/DL (ref 8–23)
CALCIUM SERPL-MCNC: 9.3 MG/DL (ref 8.8–10.2)
CHLORIDE SERPL-SCNC: 110 MMOL/L (ref 98–107)
CREAT SERPL-MCNC: 0.9 MG/DL (ref 0.67–1.17)
DEPRECATED HCO3 PLAS-SCNC: 22 MMOL/L (ref 22–29)
ERYTHROCYTE [DISTWIDTH] IN BLOOD BY AUTOMATED COUNT: 12.6 % (ref 10–15)
GFR SERPL CREATININE-BSD FRML MDRD: 83 ML/MIN/1.73M2
GLUCOSE SERPL-MCNC: 74 MG/DL (ref 70–99)
HCT VFR BLD AUTO: 38.1 % (ref 40–53)
HGB BLD-MCNC: 11.9 G/DL (ref 13.3–17.7)
MCH RBC QN AUTO: 32 PG (ref 26.5–33)
MCHC RBC AUTO-ENTMCNC: 31.2 G/DL (ref 31.5–36.5)
MCV RBC AUTO: 102 FL (ref 78–100)
PLATELET # BLD AUTO: 334 10E3/UL (ref 150–450)
POTASSIUM SERPL-SCNC: 3.9 MMOL/L (ref 3.4–5.3)
PROT SERPL-MCNC: 6.5 G/DL (ref 6.4–8.3)
RBC # BLD AUTO: 3.72 10E6/UL (ref 4.4–5.9)
SODIUM SERPL-SCNC: 145 MMOL/L (ref 136–145)
TSH SERPL DL<=0.005 MIU/L-ACNC: 0.92 UIU/ML (ref 0.3–4.2)
VIT B12 SERPL-MCNC: 256 PG/ML (ref 232–1245)
WBC # BLD AUTO: 9.9 10E3/UL (ref 4–11)

## 2023-06-05 PROCEDURE — 82607 VITAMIN B-12: CPT | Mod: ORL | Performed by: INTERNAL MEDICINE

## 2023-06-05 PROCEDURE — 84443 ASSAY THYROID STIM HORMONE: CPT | Mod: ORL | Performed by: INTERNAL MEDICINE

## 2023-06-05 PROCEDURE — 85027 COMPLETE CBC AUTOMATED: CPT | Mod: ORL | Performed by: INTERNAL MEDICINE

## 2023-06-05 PROCEDURE — 36415 COLL VENOUS BLD VENIPUNCTURE: CPT | Mod: ORL | Performed by: INTERNAL MEDICINE

## 2023-06-05 PROCEDURE — 80053 COMPREHEN METABOLIC PANEL: CPT | Mod: ORL | Performed by: INTERNAL MEDICINE

## 2023-06-05 PROCEDURE — P9604 ONE-WAY ALLOW PRORATED TRIP: HCPCS | Mod: ORL | Performed by: INTERNAL MEDICINE

## 2023-12-02 ENCOUNTER — LAB REQUISITION (OUTPATIENT)
Dept: LAB | Facility: CLINIC | Age: 88
End: 2023-12-02
Payer: MEDICARE

## 2023-12-02 DIAGNOSIS — F03.90 UNSPECIFIED DEMENTIA, UNSPECIFIED SEVERITY, WITHOUT BEHAVIORAL DISTURBANCE, PSYCHOTIC DISTURBANCE, MOOD DISTURBANCE, AND ANXIETY (H): ICD-10-CM

## 2023-12-02 DIAGNOSIS — E78.9 DISORDER OF LIPOPROTEIN METABOLISM, UNSPECIFIED: ICD-10-CM

## 2023-12-02 DIAGNOSIS — E55.9 VITAMIN D DEFICIENCY, UNSPECIFIED: ICD-10-CM

## 2023-12-04 LAB
ALBUMIN SERPL BCG-MCNC: 3.8 G/DL (ref 3.5–5.2)
ALP SERPL-CCNC: 110 U/L (ref 40–150)
ALT SERPL W P-5'-P-CCNC: 15 U/L (ref 0–70)
ANION GAP SERPL CALCULATED.3IONS-SCNC: 10 MMOL/L (ref 7–15)
AST SERPL W P-5'-P-CCNC: 22 U/L (ref 0–45)
BILIRUB SERPL-MCNC: 0.3 MG/DL
BUN SERPL-MCNC: 19.3 MG/DL (ref 8–23)
CALCIUM SERPL-MCNC: 9 MG/DL (ref 8.8–10.2)
CHLORIDE SERPL-SCNC: 109 MMOL/L (ref 98–107)
CHOLEST SERPL-MCNC: 115 MG/DL
CREAT SERPL-MCNC: 0.91 MG/DL (ref 0.67–1.17)
DEPRECATED HCO3 PLAS-SCNC: 23 MMOL/L (ref 22–29)
EGFRCR SERPLBLD CKD-EPI 2021: 82 ML/MIN/1.73M2
ERYTHROCYTE [DISTWIDTH] IN BLOOD BY AUTOMATED COUNT: 12.3 % (ref 10–15)
GLUCOSE SERPL-MCNC: 92 MG/DL (ref 70–99)
HBA1C MFR BLD: 6.1 %
HCT VFR BLD AUTO: 38.8 % (ref 40–53)
HDLC SERPL-MCNC: 48 MG/DL
HGB BLD-MCNC: 12.5 G/DL (ref 13.3–17.7)
LDLC SERPL CALC-MCNC: 43 MG/DL
MCH RBC QN AUTO: 32 PG (ref 26.5–33)
MCHC RBC AUTO-ENTMCNC: 32.2 G/DL (ref 31.5–36.5)
MCV RBC AUTO: 99 FL (ref 78–100)
NONHDLC SERPL-MCNC: 67 MG/DL
PLATELET # BLD AUTO: 338 10E3/UL (ref 150–450)
POTASSIUM SERPL-SCNC: 4.7 MMOL/L (ref 3.4–5.3)
PROT SERPL-MCNC: 6.4 G/DL (ref 6.4–8.3)
RBC # BLD AUTO: 3.91 10E6/UL (ref 4.4–5.9)
SODIUM SERPL-SCNC: 142 MMOL/L (ref 135–145)
TRIGL SERPL-MCNC: 122 MG/DL
TSH SERPL DL<=0.005 MIU/L-ACNC: 0.89 UIU/ML (ref 0.3–4.2)
VIT B12 SERPL-MCNC: 311 PG/ML (ref 232–1245)
VIT D+METAB SERPL-MCNC: 46 NG/ML (ref 20–50)
WBC # BLD AUTO: 10.6 10E3/UL (ref 4–11)

## 2023-12-04 PROCEDURE — 80061 LIPID PANEL: CPT | Mod: ORL | Performed by: INTERNAL MEDICINE

## 2023-12-04 PROCEDURE — P9604 ONE-WAY ALLOW PRORATED TRIP: HCPCS | Mod: ORL | Performed by: INTERNAL MEDICINE

## 2023-12-04 PROCEDURE — 36415 COLL VENOUS BLD VENIPUNCTURE: CPT | Mod: ORL | Performed by: INTERNAL MEDICINE

## 2023-12-04 PROCEDURE — 84443 ASSAY THYROID STIM HORMONE: CPT | Mod: ORL | Performed by: INTERNAL MEDICINE

## 2023-12-04 PROCEDURE — 82607 VITAMIN B-12: CPT | Mod: ORL | Performed by: INTERNAL MEDICINE

## 2023-12-04 PROCEDURE — 82306 VITAMIN D 25 HYDROXY: CPT | Mod: ORL | Performed by: INTERNAL MEDICINE

## 2023-12-04 PROCEDURE — 80053 COMPREHEN METABOLIC PANEL: CPT | Mod: ORL | Performed by: INTERNAL MEDICINE

## 2023-12-04 PROCEDURE — 85027 COMPLETE CBC AUTOMATED: CPT | Mod: ORL | Performed by: INTERNAL MEDICINE

## 2023-12-04 PROCEDURE — 83036 HEMOGLOBIN GLYCOSYLATED A1C: CPT | Mod: ORL | Performed by: INTERNAL MEDICINE

## 2023-12-06 ENCOUNTER — APPOINTMENT (OUTPATIENT)
Dept: CT IMAGING | Facility: HOSPITAL | Age: 88
End: 2023-12-06
Attending: EMERGENCY MEDICINE
Payer: MEDICARE

## 2023-12-06 ENCOUNTER — HOSPITAL ENCOUNTER (EMERGENCY)
Facility: HOSPITAL | Age: 88
Discharge: HOME | End: 2023-12-06
Attending: EMERGENCY MEDICINE | Admitting: EMERGENCY MEDICINE
Payer: MEDICARE

## 2023-12-06 ENCOUNTER — APPOINTMENT (OUTPATIENT)
Dept: RADIOLOGY | Facility: HOSPITAL | Age: 88
End: 2023-12-06
Attending: EMERGENCY MEDICINE
Payer: MEDICARE

## 2023-12-06 VITALS
RESPIRATION RATE: 23 BRPM | OXYGEN SATURATION: 98 % | TEMPERATURE: 97.8 F | SYSTOLIC BLOOD PRESSURE: 155 MMHG | DIASTOLIC BLOOD PRESSURE: 79 MMHG | HEART RATE: 85 BPM

## 2023-12-06 DIAGNOSIS — M54.50 ACUTE BILATERAL LOW BACK PAIN WITHOUT SCIATICA: ICD-10-CM
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
## 2023-12-06 LAB
ALBUMIN SERPL BCG-MCNC: 3.9 G/DL (ref 3.5–5.2)
ALP SERPL-CCNC: 128 U/L (ref 40–150)
ALT SERPL W P-5'-P-CCNC: 18 U/L (ref 0–70)
AMMONIA PLAS-SCNC: 22 UMOL/L (ref 16–60)
ANION GAP SERPL CALCULATED.3IONS-SCNC: 7 MMOL/L (ref 7–15)
AST SERPL W P-5'-P-CCNC: 19 U/L (ref 0–45)
BASOPHILS # BLD AUTO: 0.1 10E3/UL (ref 0–0.2)
BASOPHILS NFR BLD AUTO: 1 %
BILIRUB SERPL-MCNC: 0.2 MG/DL
BUN SERPL-MCNC: 20.4 MG/DL (ref 8–23)
CALCIUM SERPL-MCNC: 9.2 MG/DL (ref 8.8–10.2)
CHLORIDE SERPL-SCNC: 107 MMOL/L (ref 98–107)
CREAT SERPL-MCNC: 1.01 MG/DL (ref 0.67–1.17)
DEPRECATED HCO3 PLAS-SCNC: 26 MMOL/L (ref 22–29)
EGFRCR SERPLBLD CKD-EPI 2021: 72 ML/MIN/1.73M2
EOSINOPHIL # BLD AUTO: 0.2 10E3/UL (ref 0–0.7)
EOSINOPHIL NFR BLD AUTO: 3 %
ERYTHROCYTE [DISTWIDTH] IN BLOOD BY AUTOMATED COUNT: 12.2 % (ref 10–15)
GLUCOSE SERPL-MCNC: 125 MG/DL (ref 70–99)
HCT VFR BLD AUTO: 38 % (ref 40–53)
HGB BLD-MCNC: 12.3 G/DL (ref 13.3–17.7)
HOLD SPECIMEN: NORMAL
HOLD SPECIMEN: NORMAL
IMM GRANULOCYTES # BLD: 0 10E3/UL
IMM GRANULOCYTES NFR BLD: 1 %
LACTATE SERPL-SCNC: 1.4 MMOL/L (ref 0.7–2)
LYMPHOCYTES # BLD AUTO: 0.7 10E3/UL (ref 0.8–5.3)
LYMPHOCYTES NFR BLD AUTO: 11 %
MAGNESIUM SERPL-MCNC: 1.8 MG/DL (ref 1.7–2.3)
MCH RBC QN AUTO: 31.9 PG (ref 26.5–33)
MCHC RBC AUTO-ENTMCNC: 32.4 G/DL (ref 31.5–36.5)
MCV RBC AUTO: 99 FL (ref 78–100)
MONOCYTES # BLD AUTO: 0.8 10E3/UL (ref 0–1.3)
MONOCYTES NFR BLD AUTO: 13 %
NEUTROPHILS # BLD AUTO: 4.4 10E3/UL (ref 1.6–8.3)
NEUTROPHILS NFR BLD AUTO: 71 %
NRBC # BLD AUTO: 0 10E3/UL
NRBC BLD AUTO-RTO: 0 /100
PLATELET # BLD AUTO: 303 10E3/UL (ref 150–450)
POTASSIUM SERPL-SCNC: 4.1 MMOL/L (ref 3.4–5.3)
PROT SERPL-MCNC: 6.4 G/DL (ref 6.4–8.3)
RBC # BLD AUTO: 3.85 10E6/UL (ref 4.4–5.9)
SODIUM SERPL-SCNC: 140 MMOL/L (ref 135–145)
TROPONIN T SERPL HS-MCNC: 19 NG/L
WBC # BLD AUTO: 6.2 10E3/UL (ref 4–11)

## 2023-12-06 PROCEDURE — 83605 ASSAY OF LACTIC ACID: CPT | Performed by: EMERGENCY MEDICINE

## 2023-12-06 PROCEDURE — 85025 COMPLETE CBC W/AUTO DIFF WBC: CPT | Performed by: EMERGENCY MEDICINE

## 2023-12-06 PROCEDURE — 99285 EMERGENCY DEPT VISIT HI MDM: CPT | Mod: 25

## 2023-12-06 PROCEDURE — 72125 CT NECK SPINE W/O DYE: CPT | Mod: MA

## 2023-12-06 PROCEDURE — 93005 ELECTROCARDIOGRAM TRACING: CPT | Performed by: EMERGENCY MEDICINE

## 2023-12-06 PROCEDURE — 80053 COMPREHEN METABOLIC PANEL: CPT | Performed by: EMERGENCY MEDICINE

## 2023-12-06 PROCEDURE — 74176 CT ABD & PELVIS W/O CONTRAST: CPT | Mod: MA

## 2023-12-06 PROCEDURE — 70450 CT HEAD/BRAIN W/O DYE: CPT | Mod: MA

## 2023-12-06 PROCEDURE — 84484 ASSAY OF TROPONIN QUANT: CPT | Performed by: EMERGENCY MEDICINE

## 2023-12-06 PROCEDURE — 73610 X-RAY EXAM OF ANKLE: CPT | Mod: LT

## 2023-12-06 PROCEDURE — 36415 COLL VENOUS BLD VENIPUNCTURE: CPT | Performed by: EMERGENCY MEDICINE

## 2023-12-06 PROCEDURE — 82140 ASSAY OF AMMONIA: CPT | Performed by: EMERGENCY MEDICINE

## 2023-12-06 PROCEDURE — 83735 ASSAY OF MAGNESIUM: CPT | Performed by: EMERGENCY MEDICINE

## 2023-12-06 PROCEDURE — 999N000104 CT LUMBAR SPINE RECONSTRUCTED

## 2023-12-06 ASSESSMENT — ACTIVITIES OF DAILY LIVING (ADL)
ADLS_ACUITY_SCORE: 35

## 2023-12-06 ASSESSMENT — ENCOUNTER SYMPTOMS
BACK PAIN: 1
ARTHRALGIAS: 1
ABDOMINAL PAIN: 0
NECK PAIN: 0

## 2023-12-06 NOTE — ED PROVIDER NOTES
NAME: George R Milligan  AGE: 87 year old male  YOB: 1935  MRN: [id number]  EVALUATION DATE & TIME: 2023  4:59 AM    PCP: Blu Chen    ED PROVIDER: Demarcus Morrow M.D.      Chief Complaint   Patient presents with    Fall    Tailbone Pain     FINAL IMPRESSION:  1. Fall, initial encounter    2. Acute bilateral low back pain without sciatica      MEDICAL DECISION MAKIN:03 AM Patient was clinically assessed and consented to treatment. After assessment, medical decision making and workup were discussed with the patient. The patient was agreeable to plan for testing, workup, and treatment.  Pertinent Labs & Imaging studies reviewed. (See chart for details)       Medical Decision Making    History:  Supplemental history from: Documented in chart, if applicable and EMS  External Record(s) reviewed: Documented in chart, if applicable.    Work Up:  Chart documentation includes differential considered and any EKGs or imaging independently interpreted by provider, where specified.  In additional to work up documented, I considered the following work up: Documented in chart, if applicable.    External consultation:  Discussion of management with another provider: Documented in chart, if applicable    Complicating factors:  Care impacted by chronic illness: Hypertension  Care affected by social determinants of health: Access to Medical Care    Disposition considerations: Discharge. No recommendations on prescription strength medication(s). See documentation for any additional details.    George R Milligan is a 87 year old male who presents with fall.   Differential diagnosis includes but not limited to intracranial hemorrhage, skull fracture, cervical spine fracture, lumbar compression fracture, pelvic fracture.  Patient presenting for fall backwards onto his low back and possibly back of head.  Patient does not endorse any pain in the head however given age with CT scan of the head and  neck will be obtained.  Patient mainly complains about low back pain.  Patient was given small dose of pain medication will plan for imaging.  Additionally patient was found down and does believe he fell but unable to recall exactly what happened and was found on the carpet by his staff at assisted living.  EKG was done which did not show any acute signs of ischemia, labs were obtained and showed no anemia, no acute metabolic changes.  Patient also complained of left ankle tightness or pain.  Imaging of the ankle was also obtained which was negative for fracture.  CT scan of the pelvis was negative for fracture as well as lumbar reconstructions were negative for fracture.  Patient given pain medication and reassured.  Patient would like to call his nieces who help care for him to pick him up or possibly get a ride home.  He does not wish to stay in the hospital after the fall and I do feel patient would be safe to be discharged back to his assisted living where he reports patient has staff and family numbers that help.  Patient will be plan for discharge home.    0 minutes of critical care time    MEDICATIONS GIVEN IN THE EMERGENCY:  Medications - No data to display    NEW PRESCRIPTIONS STARTED AT TODAY'S ER VISIT:  Discharge Medication List as of 12/6/2023  8:46 AM             =================================================================    HPI    Patient information was obtained from: The patient    Use of : N/A         George R Milligan is a 87 year old male with a past medical history of HTN, recurrent falls, generalized muscle weakness, who presents to the ED for fall.    Per EMS, the patient was found on the carpet floor by assisted living staff and was estimated to be on the ground for an hour. He did hit the back of his head from the fall, but is complaining of lower back discomfort. Not on blood thinners.     Per patient, the patient fell as he was trying to get into bed tonight. He denies  having any history of back problems. He also endorses tightness to both ankles. No complaints of abdominal pain, hip pain, neck pain, shoulder pain, and any other medical concerns at this time.      REVIEW OF SYSTEMS   Review of Systems   Gastrointestinal:  Negative for abdominal pain.   Musculoskeletal:  Positive for arthralgias (tightness to both ankles) and back pain (lower back). Negative for neck pain.        Negative for hip pain and shoulder pain     All other systems reviewed and are negative.       PAST MEDICAL HISTORY:  History reviewed. No pertinent past medical history.    PAST SURGICAL HISTORY:  History reviewed. No pertinent surgical history.    CURRENT MEDICATIONS:    No current facility-administered medications for this encounter.    Current Outpatient Medications:     aspirin 81 mg chewable tablet, [ASPIRIN 81 MG CHEWABLE TABLET] Chew 81 mg daily., Disp: , Rfl:     cholecalciferol, vitamin D3, 1,000 unit tablet, [CHOLECALCIFEROL, VITAMIN D3, 1,000 UNIT TABLET] Take 1,000 Units by mouth daily., Disp: , Rfl:     lisinopril (ZESTRIL) 20 MG tablet, Take 1 tablet (20 mg) by mouth 2 times daily, Disp: , Rfl:     rosuvastatin (CRESTOR) 5 MG tablet, Take 1 tablet (5 mg) by mouth daily, Disp: , Rfl:     ALLERGIES:  Allergies   Allergen Reactions    Atorvastatin Muscle Pain (Myalgia)    Lopid [Gemfibrozil] Muscle Pain (Myalgia)    Pravachol [Pravastatin] Unknown       FAMILY HISTORY:  No family history on file.    SOCIAL HISTORY:   Social History     Socioeconomic History    Marital status: Single       PHYSICAL EXAM:    Vitals: BP (!) 155/79   Pulse 85   Temp 97.8  F (36.6  C) (Oral)   Resp 23   SpO2 98%    Physical Exam  Vitals and nursing note reviewed.   Constitutional:       General: He is not in acute distress.     Appearance: Normal appearance. He is normal weight. He is not ill-appearing or toxic-appearing.   HENT:      Head: Atraumatic.   Eyes:      Extraocular Movements: Extraocular movements  intact.      Pupils: Pupils are equal, round, and reactive to light.   Cardiovascular:      Rate and Rhythm: Normal rate and regular rhythm.      Heart sounds: Normal heart sounds.   Pulmonary:      Effort: Pulmonary effort is normal. No respiratory distress.      Breath sounds: Normal breath sounds.   Chest:      Chest wall: No tenderness.   Abdominal:      Palpations: Abdomen is soft.      Tenderness: There is no abdominal tenderness.   Musculoskeletal:         General: Tenderness present. No swelling, deformity or signs of injury.      Cervical back: Normal range of motion and neck supple. No tenderness.      Lumbar back: Signs of trauma, tenderness and bony tenderness present. No swelling or deformity. Negative right straight leg raise test and negative left straight leg raise test.      Left ankle: No swelling or deformity. Tenderness present over the lateral malleolus and medial malleolus. Normal pulse.   Skin:     General: Skin is warm and dry.      Findings: No bruising or erythema.   Neurological:      General: No focal deficit present.      Mental Status: He is alert and oriented to person, place, and time.      Cranial Nerves: No cranial nerve deficit.      Motor: No weakness.      Coordination: Coordination normal.   Psychiatric:         Behavior: Behavior normal.           LAB:  All pertinent labs reviewed and interpreted.  Labs Ordered and Resulted from Time of ED Arrival to Time of ED Departure   COMPREHENSIVE METABOLIC PANEL - Abnormal       Result Value    Sodium 140      Potassium 4.1      Carbon Dioxide (CO2) 26      Anion Gap 7      Urea Nitrogen 20.4      Creatinine 1.01      GFR Estimate 72      Calcium 9.2      Chloride 107      Glucose 125 (*)     Alkaline Phosphatase 128      AST 19      ALT 18      Protein Total 6.4      Albumin 3.9      Bilirubin Total 0.2     CBC WITH PLATELETS AND DIFFERENTIAL - Abnormal    WBC Count 6.2      RBC Count 3.85 (*)     Hemoglobin 12.3 (*)     Hematocrit 38.0  (*)     MCV 99      MCH 31.9      MCHC 32.4      RDW 12.2      Platelet Count 303      % Neutrophils 71      % Lymphocytes 11      % Monocytes 13      % Eosinophils 3      % Basophils 1      % Immature Granulocytes 1      NRBCs per 100 WBC 0      Absolute Neutrophils 4.4      Absolute Lymphocytes 0.7 (*)     Absolute Monocytes 0.8      Absolute Eosinophils 0.2      Absolute Basophils 0.1      Absolute Immature Granulocytes 0.0      Absolute NRBCs 0.0     MAGNESIUM - Normal    Magnesium 1.8     TROPONIN T, HIGH SENSITIVITY - Normal    Troponin T, High Sensitivity 19     AMMONIA - Normal    Ammonia 22     LACTIC ACID WHOLE BLOOD - Normal    Lactic Acid 1.4         RADIOLOGY:  XR Ankle Left 3 Views   Final Result   IMPRESSION: Moderate demineralization. Prominent vascular calcifications. No acute fractures. No ankle mortise widening. Prominent calcaneal spurring . No evidence for dislocation. No significant soft tissue swelling.      CT Lumbar Spine Reconstructed   Final Result   IMPRESSION:   1.  No acute lumbar spine fracture.            CT Abdomen Pelvis w/o Contrast   Final Result   IMPRESSION:    No acute process in the abdomen or pelvis.         CT Cervical Spine w/o Contrast   Final Result   IMPRESSION:   HEAD CT:   1.  No acute intracranial abnormality or significant change compared to the prior study.      CERVICAL SPINE CT:   1.  No acute cervical spine fracture.            CT Head w/o Contrast   Final Result   IMPRESSION:   HEAD CT:   1.  No acute intracranial abnormality or significant change compared to the prior study.      CERVICAL SPINE CT:   1.  No acute cervical spine fracture.              EKG:   Performed at: 5:13 AM on December 6, 2023.  Impression: Sinus rhythm with first-degree AV block, nonspecific T wave flattening, similar to previous EKG without any acute ST elevation ischemia.  Rate: 98 bpm  Rhythm: Sinus rhythm with first-degree AV block  QRS Interval: 86 ms  QTc Interval: 454  ms  Comparison: Comparison prior EKG from August 13, 2022 with slight lengthening of MT interval however previous chart notes first-degree AV block and diagnoses.  I have independently reviewed and interpreted the EKG(s) documented above.     PROCEDURES:   Procedures       I, Darwin William, am serving as a scribe to document services personally performed by Dr. Demarcus Morrow  based on my observation and the provider's statements to me. I, Demarcus Morrow MD attest that Darwin William is acting in a scribe capacity, has observed my performance of the services and has documented them in accordance with my direction.      Demarcus Morrow M.D.  Emergency Medicine  [facility]       Demarcus Morrow MD  12/08/23 8148

## 2023-12-06 NOTE — ED TRIAGE NOTES
"Pt arrives via [facility] following an unwitnesed fall at the patient's assisted living. The patient confirms that he was getting into bed when he fell back and hit his head. No thinners. No neck pain. No LOC. Pt states that \"my back hurts when I move around\"     Triage Assessment (Adult)       Row Name 12/06/23 0501          Triage Assessment    Airway WDL WDL        Respiratory WDL    Respiratory WDL WDL        Cardiac WDL    Cardiac WDL WDL        Peripheral/Neurovascular WDL    Peripheral Neurovascular WDL WDL        Cognitive/Neuro/Behavioral WDL    Cognitive/Neuro/Behavioral WDL X     Level of Consciousness alert     Arousal Level opens eyes spontaneously        Pupils (CN II)    Pupil PERRLA yes     Pupil Size Left 3 mm     Pupil Size Right 3 mm        Rasta Coma Scale    Best Eye Response 4-->(E4) spontaneous     Best Motor Response 6-->(M6) obeys commands     Best Verbal Response 5-->(V5) oriented     Rasta Coma Scale Score 15                     "

## 2023-12-07 ENCOUNTER — LAB REQUISITION (OUTPATIENT)
Dept: LAB | Facility: CLINIC | Age: 88
End: 2023-12-07
Payer: MEDICARE

## 2023-12-07 DIAGNOSIS — R30.0 DYSURIA: ICD-10-CM

## 2023-12-07 LAB
ALBUMIN UR-MCNC: 30 MG/DL
APPEARANCE UR: CLEAR
BILIRUB UR QL STRIP: NEGATIVE
COLOR UR AUTO: YELLOW
GLUCOSE UR STRIP-MCNC: NEGATIVE MG/DL
HGB UR QL STRIP: NEGATIVE
KETONES UR STRIP-MCNC: NEGATIVE MG/DL
LEUKOCYTE ESTERASE UR QL STRIP: NEGATIVE
MUCOUS THREADS #/AREA URNS LPF: PRESENT /LPF
NITRATE UR QL: NEGATIVE
PH UR STRIP: 5.5 [PH] (ref 5–7)
RBC URINE: 1 /HPF
SP GR UR STRIP: 1.02 (ref 1–1.03)
SQUAMOUS EPITHELIAL: <1 /HPF
UROBILINOGEN UR STRIP-MCNC: NORMAL MG/DL
WBC URINE: 1 /HPF

## 2023-12-07 PROCEDURE — 87086 URINE CULTURE/COLONY COUNT: CPT | Mod: ORL | Performed by: INTERNAL MEDICINE

## 2023-12-07 PROCEDURE — 81001 URINALYSIS AUTO W/SCOPE: CPT | Mod: ORL | Performed by: INTERNAL MEDICINE

## 2023-12-08 LAB — BACTERIA UR CULT: NO GROWTH

## 2023-12-09 LAB
ATRIAL RATE - MUSE: 98 BPM
DIASTOLIC BLOOD PRESSURE - MUSE: 76 MMHG
INTERPRETATION ECG - MUSE: NORMAL
P AXIS - MUSE: 41 DEGREES
PR INTERVAL - MUSE: 246 MS
QRS DURATION - MUSE: 86 MS
QT - MUSE: 356 MS
QTC - MUSE: 454 MS
R AXIS - MUSE: 18 DEGREES
SYSTOLIC BLOOD PRESSURE - MUSE: 162 MMHG
T AXIS - MUSE: 39 DEGREES
VENTRICULAR RATE- MUSE: 98 BPM

## 2023-12-31 ENCOUNTER — HOSPITAL ENCOUNTER (EMERGENCY)
Facility: HOSPITAL | Age: 88
Discharge: LEFT BEFORE DISCHARGE | End: 2023-12-31
Attending: EMERGENCY MEDICINE | Admitting: EMERGENCY MEDICINE
Payer: MEDICARE

## 2023-12-31 ENCOUNTER — APPOINTMENT (OUTPATIENT)
Dept: CT IMAGING | Facility: HOSPITAL | Age: 88
End: 2023-12-31
Attending: EMERGENCY MEDICINE
Payer: MEDICARE

## 2023-12-31 VITALS
SYSTOLIC BLOOD PRESSURE: 185 MMHG | HEIGHT: 67 IN | WEIGHT: 160 LBS | BODY MASS INDEX: 25.11 KG/M2 | RESPIRATION RATE: 14 BRPM | OXYGEN SATURATION: 97 % | DIASTOLIC BLOOD PRESSURE: 93 MMHG | TEMPERATURE: 97.6 F | HEART RATE: 85 BPM

## 2023-12-31 DIAGNOSIS — Z86.59 HISTORY OF DEMENTIA: ICD-10-CM

## 2023-12-31 DIAGNOSIS — R79.89 ELEVATED LACTIC ACID LEVEL: ICD-10-CM

## 2023-12-31 DIAGNOSIS — S01.81XA FACIAL LACERATION, INITIAL ENCOUNTER: ICD-10-CM

## 2023-12-31 DIAGNOSIS — S09.90XA HEAD INJURY, INITIAL ENCOUNTER: ICD-10-CM

## 2023-12-31 DIAGNOSIS — D72.829 LEUKOCYTOSIS, UNSPECIFIED TYPE: ICD-10-CM
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
## 2023-12-31 LAB
ALBUMIN SERPL BCG-MCNC: 4 G/DL (ref 3.5–5.2)
ALBUMIN UR-MCNC: 20 MG/DL
ALP SERPL-CCNC: 131 U/L (ref 40–150)
ALT SERPL W P-5'-P-CCNC: 18 U/L (ref 0–70)
ANION GAP SERPL CALCULATED.3IONS-SCNC: 8 MMOL/L (ref 7–15)
APPEARANCE UR: CLEAR
AST SERPL W P-5'-P-CCNC: 21 U/L (ref 0–45)
BASOPHILS # BLD AUTO: 0.1 10E3/UL (ref 0–0.2)
BASOPHILS NFR BLD AUTO: 0 %
BILIRUB DIRECT SERPL-MCNC: <0.2 MG/DL (ref 0–0.3)
BILIRUB SERPL-MCNC: 0.4 MG/DL
BILIRUB UR QL STRIP: NEGATIVE
BUN SERPL-MCNC: 22.8 MG/DL (ref 8–23)
CALCIUM SERPL-MCNC: 9.2 MG/DL (ref 8.8–10.2)
CHLORIDE SERPL-SCNC: 109 MMOL/L (ref 98–107)
CK SERPL-CCNC: 59 U/L (ref 39–308)
COLOR UR AUTO: YELLOW
CREAT SERPL-MCNC: 0.9 MG/DL (ref 0.67–1.17)
DEPRECATED HCO3 PLAS-SCNC: 27 MMOL/L (ref 22–29)
EGFRCR SERPLBLD CKD-EPI 2021: 83 ML/MIN/1.73M2
EOSINOPHIL # BLD AUTO: 0.1 10E3/UL (ref 0–0.7)
EOSINOPHIL NFR BLD AUTO: 0 %
ERYTHROCYTE [DISTWIDTH] IN BLOOD BY AUTOMATED COUNT: 12.8 % (ref 10–15)
FLUAV RNA SPEC QL NAA+PROBE: NEGATIVE
FLUBV RNA RESP QL NAA+PROBE: NEGATIVE
GLUCOSE SERPL-MCNC: 150 MG/DL (ref 70–99)
GLUCOSE UR STRIP-MCNC: NEGATIVE MG/DL
HCT VFR BLD AUTO: 39.6 % (ref 40–53)
HEMOCCULT STL QL: NEGATIVE
HGB BLD-MCNC: 12.9 G/DL (ref 13.3–17.7)
HGB UR QL STRIP: ABNORMAL
HOLD SPECIMEN: NORMAL
HOLD SPECIMEN: NORMAL
IMM GRANULOCYTES # BLD: 0.1 10E3/UL
IMM GRANULOCYTES NFR BLD: 1 %
KETONES UR STRIP-MCNC: NEGATIVE MG/DL
LACTATE SERPL-SCNC: 2.2 MMOL/L (ref 0.7–2)
LACTATE SERPL-SCNC: 2.4 MMOL/L (ref 0.7–2)
LEUKOCYTE ESTERASE UR QL STRIP: NEGATIVE
LIPASE SERPL-CCNC: 94 U/L (ref 13–60)
LYMPHOCYTES # BLD AUTO: 0.9 10E3/UL (ref 0.8–5.3)
LYMPHOCYTES NFR BLD AUTO: 5 %
MCH RBC QN AUTO: 32.5 PG (ref 26.5–33)
MCHC RBC AUTO-ENTMCNC: 32.6 G/DL (ref 31.5–36.5)
MCV RBC AUTO: 100 FL (ref 78–100)
MONOCYTES # BLD AUTO: 1.2 10E3/UL (ref 0–1.3)
MONOCYTES NFR BLD AUTO: 7 %
MUCOUS THREADS #/AREA URNS LPF: PRESENT /LPF
NEUTROPHILS # BLD AUTO: 16.4 10E3/UL (ref 1.6–8.3)
NEUTROPHILS NFR BLD AUTO: 87 %
NITRATE UR QL: NEGATIVE
NRBC # BLD AUTO: 0 10E3/UL
NRBC BLD AUTO-RTO: 0 /100
PH UR STRIP: 6 [PH] (ref 5–7)
PLATELET # BLD AUTO: 413 10E3/UL (ref 150–450)
POTASSIUM SERPL-SCNC: 4.4 MMOL/L (ref 3.4–5.3)
PROT SERPL-MCNC: 6.8 G/DL (ref 6.4–8.3)
RBC # BLD AUTO: 3.97 10E6/UL (ref 4.4–5.9)
RBC URINE: 21 /HPF
RSV RNA SPEC NAA+PROBE: NEGATIVE
SARS-COV-2 RNA RESP QL NAA+PROBE: NEGATIVE
SODIUM SERPL-SCNC: 144 MMOL/L (ref 135–145)
SP GR UR STRIP: >1.05 (ref 1–1.03)
TROPONIN T SERPL HS-MCNC: 24 NG/L
UROBILINOGEN UR STRIP-MCNC: 6 MG/DL
WBC # BLD AUTO: 18.7 10E3/UL (ref 4–11)
WBC URINE: 1 /HPF

## 2023-12-31 PROCEDURE — 90715 TDAP VACCINE 7 YRS/> IM: CPT | Performed by: EMERGENCY MEDICINE

## 2023-12-31 PROCEDURE — 80048 BASIC METABOLIC PNL TOTAL CA: CPT | Performed by: EMERGENCY MEDICINE

## 2023-12-31 PROCEDURE — 96365 THER/PROPH/DIAG IV INF INIT: CPT | Mod: 59

## 2023-12-31 PROCEDURE — 87637 SARSCOV2&INF A&B&RSV AMP PRB: CPT | Mod: GZ | Performed by: EMERGENCY MEDICINE

## 2023-12-31 PROCEDURE — 87040 BLOOD CULTURE FOR BACTERIA: CPT | Performed by: EMERGENCY MEDICINE

## 2023-12-31 PROCEDURE — 83690 ASSAY OF LIPASE: CPT | Performed by: EMERGENCY MEDICINE

## 2023-12-31 PROCEDURE — 80053 COMPREHEN METABOLIC PANEL: CPT | Performed by: EMERGENCY MEDICINE

## 2023-12-31 PROCEDURE — 250N000011 HC RX IP 250 OP 636: Performed by: EMERGENCY MEDICINE

## 2023-12-31 PROCEDURE — 36415 COLL VENOUS BLD VENIPUNCTURE: CPT | Performed by: EMERGENCY MEDICINE

## 2023-12-31 PROCEDURE — 83605 ASSAY OF LACTIC ACID: CPT | Mod: 91 | Performed by: EMERGENCY MEDICINE

## 2023-12-31 PROCEDURE — 84484 ASSAY OF TROPONIN QUANT: CPT | Performed by: EMERGENCY MEDICINE

## 2023-12-31 PROCEDURE — 90471 IMMUNIZATION ADMIN: CPT | Performed by: EMERGENCY MEDICINE

## 2023-12-31 PROCEDURE — 82550 ASSAY OF CK (CPK): CPT | Performed by: EMERGENCY MEDICINE

## 2023-12-31 PROCEDURE — 258N000003 HC RX IP 258 OP 636: Performed by: EMERGENCY MEDICINE

## 2023-12-31 PROCEDURE — 82248 BILIRUBIN DIRECT: CPT | Performed by: EMERGENCY MEDICINE

## 2023-12-31 PROCEDURE — 72125 CT NECK SPINE W/O DYE: CPT | Mod: ME

## 2023-12-31 PROCEDURE — 82272 OCCULT BLD FECES 1-3 TESTS: CPT | Mod: GZ | Performed by: STUDENT IN AN ORGANIZED HEALTH CARE EDUCATION/TRAINING PROGRAM

## 2023-12-31 PROCEDURE — 96361 HYDRATE IV INFUSION ADD-ON: CPT

## 2023-12-31 PROCEDURE — 99285 EMERGENCY DEPT VISIT HI MDM: CPT | Mod: 25

## 2023-12-31 PROCEDURE — 93005 ELECTROCARDIOGRAM TRACING: CPT | Performed by: EMERGENCY MEDICINE

## 2023-12-31 PROCEDURE — 70450 CT HEAD/BRAIN W/O DYE: CPT | Mod: MG

## 2023-12-31 PROCEDURE — 74177 CT ABD & PELVIS W/CONTRAST: CPT | Mod: MG

## 2023-12-31 PROCEDURE — 12011 RPR F/E/E/N/L/M 2.5 CM/<: CPT

## 2023-12-31 PROCEDURE — 81001 URINALYSIS AUTO W/SCOPE: CPT | Performed by: STUDENT IN AN ORGANIZED HEALTH CARE EDUCATION/TRAINING PROGRAM

## 2023-12-31 PROCEDURE — 71275 CT ANGIOGRAPHY CHEST: CPT | Mod: MF

## 2023-12-31 PROCEDURE — 85025 COMPLETE CBC W/AUTO DIFF WBC: CPT | Performed by: EMERGENCY MEDICINE

## 2023-12-31 RX ORDER — DESONIDE 0.5 MG/G
CREAM TOPICAL 2 TIMES DAILY PRN
COMMUNITY

## 2023-12-31 RX ORDER — PIPERACILLIN SODIUM, TAZOBACTAM SODIUM 3; .375 G/15ML; G/15ML
3.38 INJECTION, POWDER, LYOPHILIZED, FOR SOLUTION INTRAVENOUS ONCE
Status: COMPLETED | OUTPATIENT
Start: 2023-12-31 | End: 2023-12-31

## 2023-12-31 RX ORDER — LOSARTAN POTASSIUM 25 MG/1
25 TABLET ORAL DAILY
COMMUNITY

## 2023-12-31 RX ORDER — ACETAMINOPHEN 500 MG
1000 TABLET ORAL 3 TIMES DAILY
COMMUNITY

## 2023-12-31 RX ORDER — GUAIFENESIN 600 MG/1
1200 TABLET, EXTENDED RELEASE ORAL 2 TIMES DAILY PRN
COMMUNITY

## 2023-12-31 RX ORDER — IOPAMIDOL 755 MG/ML
90 INJECTION, SOLUTION INTRAVASCULAR ONCE
Status: COMPLETED | OUTPATIENT
Start: 2023-12-31 | End: 2023-12-31

## 2023-12-31 RX ORDER — LOPERAMIDE HCL 2 MG
2 CAPSULE ORAL 3 TIMES DAILY PRN
COMMUNITY

## 2023-12-31 RX ORDER — POTASSIUM CHLORIDE 750 MG/1
10 TABLET, EXTENDED RELEASE ORAL DAILY
COMMUNITY

## 2023-12-31 RX ORDER — KETOCONAZOLE 20 MG/ML
SHAMPOO TOPICAL
COMMUNITY

## 2023-12-31 RX ADMIN — SODIUM CHLORIDE 500 ML: 9 INJECTION, SOLUTION INTRAVENOUS at 07:01

## 2023-12-31 RX ADMIN — IOPAMIDOL 90 ML: 755 INJECTION, SOLUTION INTRAVENOUS at 07:47

## 2023-12-31 RX ADMIN — PIPERACILLIN AND TAZOBACTAM 3.38 G: 3; .375 INJECTION, POWDER, FOR SOLUTION INTRAVENOUS at 10:13

## 2023-12-31 RX ADMIN — SODIUM CHLORIDE 500 ML: 9 INJECTION, SOLUTION INTRAVENOUS at 08:36

## 2023-12-31 RX ADMIN — CLOSTRIDIUM TETANI TOXOID ANTIGEN (FORMALDEHYDE INACTIVATED), CORYNEBACTERIUM DIPHTHERIAE TOXOID ANTIGEN (FORMALDEHYDE INACTIVATED), BORDETELLA PERTUSSIS TOXOID ANTIGEN (GLUTARALDEHYDE INACTIVATED), BORDETELLA PERTUSSIS FILAMENTOUS HEMAGGLUTININ ANTIGEN (FORMALDEHYDE INACTIVATED), BORDETELLA PERTUSSIS PERTACTIN ANTIGEN, AND BORDETELLA PERTUSSIS FIMBRIAE 2/3 ANTIGEN 0.5 ML: 5; 2; 2.5; 5; 3; 5 INJECTION, SUSPENSION INTRAMUSCULAR at 08:36

## 2023-12-31 ASSESSMENT — ACTIVITIES OF DAILY LIVING (ADL)
ADLS_ACUITY_SCORE: 35

## 2023-12-31 ASSESSMENT — ENCOUNTER SYMPTOMS
DIZZINESS: 1
BACK PAIN: 0
SHORTNESS OF BREATH: 0
ABDOMINAL PAIN: 0

## 2023-12-31 NOTE — ED NOTES
Elizabeth Villalba called for an update on the patient. Theresa had called her. Orly did not have a good handle on patients current medical situation.

## 2023-12-31 NOTE — ED NOTES
Bed: JNED-05  Expected date: 12/31/23  Expected time: 6:26 AM  Means of arrival: Ambulance  Comments:  [place] 88M fall, hit head

## 2023-12-31 NOTE — ED NOTES
Patients JOSE RAUL Henry called and updated on the plan of care. Elaine reported that she will be at bedside shortly

## 2023-12-31 NOTE — ED NOTES
Orly, patients niece has decided too take the patient back to his assisted living facility. Orly will transport him back. MD updated.

## 2023-12-31 NOTE — DISCHARGE INSTRUCTIONS
Read and follow the discharge instructions.    Continue current medications.  You may give Tylenol as needed for pain.    We discussed the abnormalities found in the evaluation of your family member which include an elevated white blood cell count and elevated lactic acid and concern for an infection.    Knowing these concerns you are choosing to take your family member back to his nursing home.    We recommend that patient be evaluated by his primary care doctor as soon as possible.    If your family member develops fever vomiting not acting as usual return to the emergency department.

## 2023-12-31 NOTE — PHARMACY-ADMISSION MEDICATION HISTORY
Pharmacist Admission Medication History    Admission medication history is complete. The information provided in this note is only as accurate as the sources available at the time of the update.    Information Source(s): Facility (U/NH/) medication list/MAR and CareEverywhere/SureScripts via phone    Pertinent Information: Obtained medication list/MAR from longterm: [facility] (Ph: [phone])    Changes made to PTA medication list:  Added: Losartan, ketoconazole, APAP, desonide, Kcl, mucinex  Deleted: Lisinopril  Changed: vitamin D    Allergies reviewed with patient and updates made in EHR: unable to assess    Medication History Completed By: Kate Leong [facility] 12/31/2023 10:27 AM    Prior to Admission medications    Medication Sig Last Dose Taking? Auth Provider Long Term End Date   acetaminophen (TYLENOL) 500 MG tablet Take 1,000 mg by mouth 3 times daily 12/30/2023 at PM Yes Unknown, Entered By History     aspirin 81 mg chewable tablet [ASPIRIN 81 MG CHEWABLE TABLET] Chew 81 mg daily. 12/30/2023 at AM Yes Provider, Historical     cholecalciferol 50 MCG (2000 UT) tablet Take 2,000 Units by mouth daily 12/30/2023 at AM Yes Provider, Historical     desonide (DESOWEN) 0.05 % external cream Apply topically 2 times daily as needed (Rash/dry skin around eyebrows and behind ears) Unknown at PRN Yes Unknown, Entered By History     guaiFENesin (MUCINEX) 600 MG 12 hr tablet Take 1,200 mg by mouth 2 times daily as needed for congestion Unknown at PRN Yes Unknown, Entered By History     ketoconazole (NIZORAL) 2 % external shampoo Apply topically twice a week On Mondays and Fridays 12/29/2023 at AM Yes Unknown, Entered By History     loperamide (IMODIUM) 2 MG capsule Take 2 mg by mouth 3 times daily as needed for diarrhea Unknown at PRN Yes Unknown, Entered By History     losartan (COZAAR) 25 MG tablet Take 25 mg by mouth daily 12/30/2023 at AM Yes Unknown, Entered By History Yes    potassium chloride ER  (KLOR-CON M) 10 MEQ CR tablet Take 10 mEq by mouth daily 12/30/2023 at AM Yes Unknown, Entered By History     rosuvastatin (CRESTOR) 5 MG tablet Take 1 tablet (5 mg) by mouth daily 12/30/2023 at PM Yes Adarsh Cade MD Yes

## 2023-12-31 NOTE — ED TRIAGE NOTES
[facility] from [facility] [place].  Pt. Was found in bathroom on floor by staff at approx. 0545 today.  Pt does not remember fall but knows that he was unable to get up on his own.  Unknown when pt. Fell.       Triage Assessment (Adult)       Row Name 12/31/23 0640          Triage Assessment    Airway WDL WDL        Respiratory WDL    Respiratory WDL WDL        Skin Circulation/Temperature WDL    Skin Circulation/Temperature WDL WDL        Cardiac WDL    Cardiac WDL X     Cardiac Rhythm ST        Peripheral/Neurovascular WDL    Peripheral Neurovascular WDL WDL        Cognitive/Neuro/Behavioral WDL    Cognitive/Neuro/Behavioral WDL X     Level of Consciousness confused     Arousal Level opens eyes spontaneously     Orientation person     Speech spontaneous     Mood/Behavior calm;cooperative        Pupils (CN II)    Pupil PERRLA yes     Pupil Size Left 3 mm     Pupil Size Right 3 mm

## 2023-12-31 NOTE — ED NOTES
Patient ambulated well with a walker. He was steady on his feet. He does use a wheel chair and a walker at home. He did not c/o any pain as he walked.

## 2023-12-31 NOTE — ED PROVIDER NOTES
EMERGENCY DEPARTMENT ENCOUNTER      NAME: George R Milligan  AGE: 88 year old male  YOB: 1935  MRN: [id number]  EVALUATION DATE & TIME: 12/31/2023  6:35 AM    PCP: Blu Chen    ED PROVIDER: Denisa Boyd M.D.      CHIEF COMPLAINT     Chief Complaint   Patient presents with    Fall     Fall from standing         FINAL IMPRESSION:     1. Fall at home, initial encounter    2. Head injury, initial encounter    3. Facial laceration, initial encounter    4. Leukocytosis, unspecified type    5. Elevated lactic acid level    6. History of dementia          MEDICAL DECISION MAKING:       Pertinent Labs & Imaging studies reviewed. (See chart for details)    88 year old male presents to the Emergency Department for evaluation of fall    History  Supplemental history from EMS  External Record(s) review as documented below    Exam  Nontoxic cooperative and pleasant looks like he does not feel well.  Left anterior scalp laceration is a flap and not actively bleeding.  Patient has dark dried blood on his nose but no evidence of nose trauma.  No evidence of oral trauma.  Digital rectal exam reveals no gross blood no melena.    Differential Diagnosis include but not limited to trauma, arrhythmia, sepsis, acute coronary syndrome, among others      Vital Signs: Hypertension.   EKG: Cysts sinus tachycardia poor anterior progression normal axis first-degree AV block  Imaging: I independently interpreted CT head no hemorrhage.Formal read by radiologist.  Home meds: Reviewed  ED meds: Tdap, Zosyn, Dermabond  Fluids: ns  Labs:  K 4.4  Cr 0.90  Wbc 18.7  Hgb 12.9  platelets 413      Clinical Impression and Decision Making    88-year-old male presents here from [facility] after he fall.  He states that he felt dizzy.  Per EMS not anticoagulated.    On exam he is nontoxic has signs of head trauma eyes oriented to self thinks he is at another hospital.  Does not know the year or the month.  Has a flap laceration in  the left occiput.  Some ecchymosis on the left forearm but otherwise no chest abdomen upper or lower back trauma and has full range of motion of bilateral upper and lower extremities.  He has dried blood on his nose but no signs of nose or oral trauma concern about hematemesis therefore a digital rectal exam was done that reveals no gross blood no melena.    Differential diagnosis included trauma sepsis arrhythmia among others.    Labs reveal normal renal function with a mildly elevated lipase at 94.  Lactic acid is 2.2 with an elevated white blood cell count and left shift.  Source of infection is not known we will panculture patient and start him on broad-spectrum antibiotics.  No liver function test.  Occult blood was negative.  No evidence of rhabdomyolysis.  Mild elevation of troponin at 24 EKG without ST segment elevation or significant depression and he currently denies chest pain.    Trauma evaluation included a CT head CT C-spine CT chest and abdomen.  No signs of trauma.  No signs of pneumonia or abscess.    given the elevated lactic acid and elevated white blood cell count Zosyn was started.    Reevaluation knees and JOSE RAUL Villalba came to evaluate patient.  I spoke with her at length regarding the abnormalities of the labs and no obvious source of infection but given his age there is lactic acid elevated white blood cell count with a left shift and recommendations for some admission.    Spoke with her on-call.  He and she does not want him to be admitted.  She understand and verbalizes understanding of my concern about possibility of infection.  She feels comfortable with him going back to his memory care and states we will have him reevaluated by the the physicians that round in the nursing home.    I chose not to sign patient AGAINST MEDICAL ADVICE given his history of dementia but given that the POA is competent.  Nurse was present.  She understood.  Prefer him to go back to his memory care and  follow-up this was done and she understood the risk.    Patient was sent back to his memory care via private transfer with his POA and family member Orly.        In addition to the work out documented, I considered the following work up admission.  POA declined.        Medical Decision Making    History:  Supplemental history from: EMS  External Record(s) reviewed: Documented in chart, if applicable.    Work Up:  Chart documentation includes differential considered and any EKGs or imaging independently interpreted by provider, where specified.  In additional to work up documented, I considered the following work up: Documented in chart, if applicable.    External consultation:  Discussion of management with another provider: Documented in chart, if applicable    Complicating factors:  Care impacted by chronic illness: Chronic Kidney Disease, Hyperlipidemia, Hypertension, and Other: anemia, dementia  Care affected by social determinants of health: N/A    Disposition considerations: Discharge. I recommended the patient continue their current prescription strength medication(s): Losartan. See documentation for any additional details.          Review of External Records  Hospital/Clinic: [facility]   Date: 12/7/2023  Dysuria     External Consultation  [facility], Dr. Birksted.       ED COURSE   7:30 AM I met with the patient, obtained history, performed an initial exam, and discussed options and plan for diagnostics and treatment here in the ED.   8:42 AM I rechecked on the patient and updated them.  8:53 AM I spoke with the patient's assisted living care facility and obtained history.    9:47 AM I spoke to [facility], Dr. Birksted, who is agreeable to get the patient Medical Telemetry Observation.  10:07 AM I spoke to the patient's niece, who is his POA. Niece will discuss with patient about plan.   10:26 AM Patient and patient's POA requests discharge. We discussed plans for discharge including  supportive cares, symptomatic treatment, outpatient follow up, and reasons to return to the emergency department.      At the conclusion of the encounter I discussed the results of all of the tests and the disposition. The questions were answered. The patient acknowledged understanding and was agreeable with the care plan.             MEDICATIONS GIVEN IN THE EMERGENCY:     Medications   sodium chloride 0.9% BOLUS 500 mL (0 mLs Intravenous Stopped 12/31/23 0833)   Tdap (tetanus-diphtheria-acell pertussis) (ADACEL) injection 0.5 mL (0.5 mLs Intramuscular $Given 12/31/23 0836)   iopamidol (ISOVUE-370) solution 90 mL (90 mLs Intravenous $Given 12/31/23 0747)   sodium chloride 0.9% BOLUS 500 mL (0 mLs Intravenous Stopped 12/31/23 0929)   piperacillin-tazobactam (ZOSYN) 3.375 g vial to attach to  mL bag (0 g Intravenous Stopped 12/31/23 1040)       NEW PRESCRIPTIONS STARTED AT TODAY'S ER VISIT     Discharge Medication List as of 12/31/2023 10:41 AM             =================================================================    HPI     Patient information was obtained from: patient, EMS    Use of : N/A         George R Milligan is a 88 year old male who presents by EMS for evaluation of fall.     Per EMS, patient reports from assisted living because he fell on the bathroom floor and could not up. He has no memory of the fall, and was reportedly on the bathroom floor for 30 minutes. Patient was dizzy after the fall and could not get up on his on own. He has a scab on his left forearm and laceration on his left eyebrow. Per EMS, patient is not on blood thinners. Per EMS, patient is oriented to event/person but not time/place.     Per patient's niece, the patient is checked on multiple times during the night, and when he was checked this morning after breakfast, he was found on the bathroom floor. Patient reports hx of a similar fall less than a month ago. She says the falls happen often when he gets up to  "go to the bathroom at night, since patient has difficulty ambulating.    Of note, patient's last Tetanus shot was in 2012.    Patient denies chest pain, shortness of breath, abdominal pain, back pain or any other complaints at this time.     REVIEW OF SYSTEMS   Review of Systems   HENT:          Positive for head laceration    Respiratory:  Negative for shortness of breath.    Cardiovascular:  Negative for chest pain.   Gastrointestinal:  Negative for abdominal pain.   Musculoskeletal:  Negative for back pain.   Neurological:  Positive for dizziness.      Positive for head laceration.     PAST MEDICAL HISTORY:   No past medical history on file.    PAST SURGICAL HISTORY:   No past surgical history on file.      CURRENT MEDICATIONS:   acetaminophen (TYLENOL) 500 MG tablet  aspirin 81 mg chewable tablet  cholecalciferol 50 MCG (2000 UT) tablet  desonide (DESOWEN) 0.05 % external cream  guaiFENesin (MUCINEX) 600 MG 12 hr tablet  ketoconazole (NIZORAL) 2 % external shampoo  loperamide (IMODIUM) 2 MG capsule  losartan (COZAAR) 25 MG tablet  potassium chloride ER (KLOR-CON M) 10 MEQ CR tablet  rosuvastatin (CRESTOR) 5 MG tablet         ALLERGIES:     Allergies   Allergen Reactions    Atorvastatin Muscle Pain (Myalgia)    Lopid [Gemfibrozil] Muscle Pain (Myalgia)    Pravachol [Pravastatin] Unknown       FAMILY HISTORY:   No family history on file.    SOCIAL HISTORY:     Social History     Socioeconomic History    Marital status: Single       VITALS:   BP (!) 185/93   Pulse 85   Temp 97.6  F (36.4  C) (Oral)   Resp 14   Ht 1.702 m (5' 7\")   Wt 72.6 kg (160 lb)   SpO2 97%   BMI 25.06 kg/m      PHYSICAL EXAM     Physical Exam  Vitals and nursing note reviewed. Exam conducted with a chaperone present.   HENT:      Head:        Comments: Laceration nongaping.  Musculoskeletal:        Arms:       Comments: Ecchymosis no bony tenderness palpation.         Physical Exam   Constitutional: well appearing, cooperative    Head: " Laceration to above left eyebrow.    Nose: Nose normal.     Mouth/Throat: Oropharynx is clear and moist.     Eyes: EOM are normal. Pupils are equal, round, and reactive to light.     Ears: Bilateral pearly white tympanic membranes.    Neck: Normal range of motion. Neck supple.     Cardiovascular: regular rhythm and normal heart sounds.  Tachycardic     Pulmonary/Chest: Normal effort and breath sounds normal. Coarse sounds bilaterally. Midline chest scar.    Abdominal: soft nontender. Surgical scars, non tender    Musculoskeletal: Normal range of motion.     Neurological: Moves upper and lower extremities equally.  At baseline per family member oriented to self recognizes his nice    Lymphatics: no edema    : Wearing adult diaper normal male anatomy NINO yellow stool    Skin: Skin is warm and dry. Pale, ecchymosis to left forearm. Laceration to left eyebrow.     Psychiatric: Normal mood and affect. Behavior is normal.       LAB:     All pertinent labs reviewed and interpreted.  Labs Ordered and Resulted from Time of ED Arrival to Time of ED Departure   BASIC METABOLIC PANEL - Abnormal       Result Value    Sodium 144      Potassium 4.4      Chloride 109 (*)     Carbon Dioxide (CO2) 27      Anion Gap 8      Urea Nitrogen 22.8      Creatinine 0.90      GFR Estimate 83      Calcium 9.2      Glucose 150 (*)    LIPASE - Abnormal    Lipase 94 (*)    TROPONIN T, HIGH SENSITIVITY - Abnormal    Troponin T, High Sensitivity 24 (*)    LACTIC ACID WHOLE BLOOD - Abnormal    Lactic Acid 2.2 (*)    CBC WITH PLATELETS AND DIFFERENTIAL - Abnormal    WBC Count 18.7 (*)     RBC Count 3.97 (*)     Hemoglobin 12.9 (*)     Hematocrit 39.6 (*)           MCH 32.5      MCHC 32.6      RDW 12.8      Platelet Count 413      % Neutrophils 87      % Lymphocytes 5      % Monocytes 7      % Eosinophils 0      % Basophils 0      % Immature Granulocytes 1      NRBCs per 100 WBC 0      Absolute Neutrophils 16.4 (*)     Absolute Lymphocytes 0.9       Absolute Monocytes 1.2      Absolute Eosinophils 0.1      Absolute Basophils 0.1      Absolute Immature Granulocytes 0.1      Absolute NRBCs 0.0     LACTIC ACID WHOLE BLOOD - Abnormal    Lactic Acid 2.4 (*)    ROUTINE UA WITH MICROSCOPIC REFLEX TO CULTURE - Abnormal    Color Urine Yellow      Appearance Urine Clear      Glucose Urine Negative      Bilirubin Urine Negative      Ketones Urine Negative      Specific Gravity Urine >1.050 (*)     Blood Urine 0.06 mg/dL (*)     pH Urine 6.0      Protein Albumin Urine 20 (*)     Urobilinogen Urine 6.0 (*)     Nitrite Urine Negative      Leukocyte Esterase Urine Negative      Mucus Urine Present (*)     RBC Urine 21 (*)     WBC Urine 1     HEPATIC FUNCTION PANEL - Normal    Protein Total 6.8      Albumin 4.0      Bilirubin Total 0.4      Alkaline Phosphatase 131      AST 21      ALT 18      Bilirubin Direct <0.20     INFLUENZA A/B, RSV, & SARS-COV2 PCR - Normal    Influenza A PCR Negative      Influenza B PCR Negative      RSV PCR Negative      SARS CoV2 PCR Negative     CK TOTAL - Normal    CK 59     OCCULT BLOOD STOOL - Normal    Occult Blood Negative     BLOOD CULTURE        RADIOLOGY:     Reviewed all pertinent imaging. Please see official radiology report.  CT Abdomen Pelvis w Contrast   Final Result   IMPRESSION:    1.  No pulmonary embolus or acute traumatic findings.   2.  Similar findings of pulmonary fibrosis.         CT Chest Pulmonary Embolism w Contrast   Final Result   IMPRESSION:    1.  No pulmonary embolus or acute traumatic findings.   2.  Similar findings of pulmonary fibrosis.         CT Cervical Spine w/o Contrast   Final Result   IMPRESSION:   HEAD CT:   1.  Interval development of acute sinusitis.   2.  No CT evidence for acute intracranial process.   3.  Brain atrophy and presumed chronic microvascular ischemic changes as above.   4.  Disproportionate mesial temporal volume loss. Correlate for dementia.       CERVICAL SPINE CT:   1.  No CT  evidence for acute fracture or post traumatic subluxation.   2.  No high-grade spinal canal or neural foraminal stenosis.      Head CT w/o contrast   Final Result   IMPRESSION:   HEAD CT:   1.  Interval development of acute sinusitis.   2.  No CT evidence for acute intracranial process.   3.  Brain atrophy and presumed chronic microvascular ischemic changes as above.   4.  Disproportionate mesial temporal volume loss. Correlate for dementia.       CERVICAL SPINE CT:   1.  No CT evidence for acute fracture or post traumatic subluxation.   2.  No high-grade spinal canal or neural foraminal stenosis.           EKG:     EKG #1  Sinus tachycardia poor anterior progression normal axis  Time:[id number]    Ventricular rate 109 bmp  Axis normal  TX interval 210 ms  QRS duration 86 ms  QT//433 ms    Compared to previous EKG on December 6, 2020 23rd normal anterior progression in V3 then.  Heart rate was 98.  I have independently reviewed and interpreted the EKG(s) documented above.      PROCEDURES:     Procedures   PROCEDURE: Laceration Repair   INDICATIONS: Laceration   PROCEDURE PROVIDER: Dr Denisa Boyd   SITE: Above left eyebrow    TYPE/SIZE: simple, clean, and no foreign body visualized  2 cm (total length)   FUNCTIONAL ASSESSMENT: Distal sensation, circulation, and motor intact   MEDICATION: dermabond   PREPARATION: soaking with Normal saline   DEBRIDEMENT: no debridement   CLOSURE:  Superficial layer closed with Dermabond (medical glue)    Total number of sutures/staples placed: 0          I, Aleta Morse, am serving as a scribe to document services personally performed by Dr. Boyd based on my observation and the provider's statements to me. I, Denisa Boyd MD attest that Aleta Morse is acting in a scribe capacity, has observed my performance of the services and has documented them in accordance with my direction.    Denisa Boyd M.D.  Emergency Medicine  [facility]  EMERGENCY DEPARTMENT  [address] [place] [id number]  [phone]  Dept: [phone]       Denisa Boyd MD  12/31/23 [id number]

## 2024-01-05 LAB — BACTERIA BLD CULT: NO GROWTH

## 2024-01-06 ENCOUNTER — LAB REQUISITION (OUTPATIENT)
Dept: LAB | Facility: CLINIC | Age: 89
End: 2024-01-06
Payer: MEDICARE

## 2024-01-06 DIAGNOSIS — F03.90 UNSPECIFIED DEMENTIA, UNSPECIFIED SEVERITY, WITHOUT BEHAVIORAL DISTURBANCE, PSYCHOTIC DISTURBANCE, MOOD DISTURBANCE, AND ANXIETY (H): ICD-10-CM

## 2024-01-06 DIAGNOSIS — E87.20 ACIDOSIS, UNSPECIFIED: ICD-10-CM

## 2024-01-08 LAB
ALBUMIN SERPL BCG-MCNC: 3.5 G/DL (ref 3.5–5.2)
ALP SERPL-CCNC: 95 U/L (ref 40–150)
ALT SERPL W P-5'-P-CCNC: 12 U/L (ref 0–70)
ANION GAP SERPL CALCULATED.3IONS-SCNC: 9 MMOL/L (ref 7–15)
AST SERPL W P-5'-P-CCNC: 21 U/L (ref 0–45)
ATRIAL RATE - MUSE: 109 BPM
BILIRUB SERPL-MCNC: 0.2 MG/DL
BUN SERPL-MCNC: 21.4 MG/DL (ref 8–23)
CALCIUM SERPL-MCNC: 8.9 MG/DL (ref 8.8–10.2)
CHLORIDE SERPL-SCNC: 107 MMOL/L (ref 98–107)
CREAT SERPL-MCNC: 0.95 MG/DL (ref 0.67–1.17)
DEPRECATED HCO3 PLAS-SCNC: 24 MMOL/L (ref 22–29)
DIASTOLIC BLOOD PRESSURE - MUSE: 93 MMHG
EGFRCR SERPLBLD CKD-EPI 2021: 77 ML/MIN/1.73M2
ERYTHROCYTE [DISTWIDTH] IN BLOOD BY AUTOMATED COUNT: 13.4 % (ref 10–15)
GLUCOSE SERPL-MCNC: 142 MG/DL (ref 70–99)
HCT VFR BLD AUTO: 34.7 % (ref 40–53)
HGB BLD-MCNC: 11.1 G/DL (ref 13.3–17.7)
INTERPRETATION ECG - MUSE: NORMAL
MCH RBC QN AUTO: 32.7 PG (ref 26.5–33)
MCHC RBC AUTO-ENTMCNC: 32 G/DL (ref 31.5–36.5)
MCV RBC AUTO: 102 FL (ref 78–100)
P AXIS - MUSE: 69 DEGREES
PLATELET # BLD AUTO: 367 10E3/UL (ref 150–450)
POTASSIUM SERPL-SCNC: 4 MMOL/L (ref 3.4–5.3)
PR INTERVAL - MUSE: 210 MS
PROT SERPL-MCNC: 6 G/DL (ref 6.4–8.3)
QRS DURATION - MUSE: 86 MS
QT - MUSE: 322 MS
QTC - MUSE: 433 MS
R AXIS - MUSE: 21 DEGREES
RBC # BLD AUTO: 3.39 10E6/UL (ref 4.4–5.9)
SODIUM SERPL-SCNC: 140 MMOL/L (ref 135–145)
SYSTOLIC BLOOD PRESSURE - MUSE: 128 MMHG
T AXIS - MUSE: 226 DEGREES
VENTRICULAR RATE- MUSE: 109 BPM
WBC # BLD AUTO: 12.5 10E3/UL (ref 4–11)

## 2024-01-08 PROCEDURE — 85027 COMPLETE CBC AUTOMATED: CPT | Mod: ORL | Performed by: INTERNAL MEDICINE

## 2024-01-08 PROCEDURE — 36415 COLL VENOUS BLD VENIPUNCTURE: CPT | Mod: ORL | Performed by: INTERNAL MEDICINE

## 2024-01-08 PROCEDURE — P9604 ONE-WAY ALLOW PRORATED TRIP: HCPCS | Mod: ORL | Performed by: INTERNAL MEDICINE

## 2024-01-08 PROCEDURE — 80053 COMPREHEN METABOLIC PANEL: CPT | Mod: ORL | Performed by: INTERNAL MEDICINE

## 2024-01-09 ENCOUNTER — LAB REQUISITION (OUTPATIENT)
Dept: LAB | Facility: CLINIC | Age: 89
End: 2024-01-09
Payer: MEDICARE

## 2024-01-09 DIAGNOSIS — R30.0 DYSURIA: ICD-10-CM

## 2024-01-09 LAB
ALBUMIN UR-MCNC: 10 MG/DL
APPEARANCE UR: CLEAR
BILIRUB UR QL STRIP: NEGATIVE
COLOR UR AUTO: ABNORMAL
GLUCOSE UR STRIP-MCNC: NEGATIVE MG/DL
HGB UR QL STRIP: NEGATIVE
KETONES UR STRIP-MCNC: NEGATIVE MG/DL
LEUKOCYTE ESTERASE UR QL STRIP: NEGATIVE
MUCOUS THREADS #/AREA URNS LPF: PRESENT /LPF
NITRATE UR QL: NEGATIVE
PH UR STRIP: 5 [PH] (ref 5–7)
RBC URINE: 1 /HPF
SP GR UR STRIP: 1.02 (ref 1–1.03)
UROBILINOGEN UR STRIP-MCNC: NORMAL MG/DL
WBC URINE: 1 /HPF

## 2024-01-09 PROCEDURE — 87086 URINE CULTURE/COLONY COUNT: CPT | Mod: ORL

## 2024-01-09 PROCEDURE — 81001 URINALYSIS AUTO W/SCOPE: CPT | Mod: ORL

## 2024-01-10 LAB — BACTERIA UR CULT: NO GROWTH

## 2024-01-12 ENCOUNTER — LAB REQUISITION (OUTPATIENT)
Dept: LAB | Facility: CLINIC | Age: 89
End: 2024-01-12
Payer: MEDICARE

## 2024-01-12 DIAGNOSIS — I10 ESSENTIAL (PRIMARY) HYPERTENSION: ICD-10-CM

## 2024-01-12 DIAGNOSIS — D64.9 ANEMIA, UNSPECIFIED: ICD-10-CM

## 2024-01-12 DIAGNOSIS — E03.9 HYPOTHYROIDISM, UNSPECIFIED: ICD-10-CM

## 2024-01-12 DIAGNOSIS — D72.829 ELEVATED WHITE BLOOD CELL COUNT, UNSPECIFIED: ICD-10-CM

## 2024-01-12 DIAGNOSIS — D72.824 BASOPHILIA: ICD-10-CM

## 2024-01-15 LAB
ALBUMIN SERPL BCG-MCNC: 3.7 G/DL (ref 3.5–5.2)
ALP SERPL-CCNC: 102 U/L (ref 40–150)
ALT SERPL W P-5'-P-CCNC: 17 U/L (ref 0–70)
ANION GAP SERPL CALCULATED.3IONS-SCNC: 10 MMOL/L (ref 7–15)
AST SERPL W P-5'-P-CCNC: 24 U/L (ref 0–45)
BASOPHILS # BLD AUTO: 0.1 10E3/UL (ref 0–0.2)
BASOPHILS NFR BLD AUTO: 1 %
BILIRUB SERPL-MCNC: 0.2 MG/DL
BUN SERPL-MCNC: 17.3 MG/DL (ref 8–23)
CALCIUM SERPL-MCNC: 9.1 MG/DL (ref 8.8–10.2)
CHLORIDE SERPL-SCNC: 107 MMOL/L (ref 98–107)
CREAT SERPL-MCNC: 0.95 MG/DL (ref 0.67–1.17)
CRP SERPL HS-MCNC: <0.15 MG/L
DEPRECATED HCO3 PLAS-SCNC: 24 MMOL/L (ref 22–29)
EGFRCR SERPLBLD CKD-EPI 2021: 77 ML/MIN/1.73M2
EOSINOPHIL # BLD AUTO: 0.3 10E3/UL (ref 0–0.7)
EOSINOPHIL NFR BLD AUTO: 3 %
ERYTHROCYTE [DISTWIDTH] IN BLOOD BY AUTOMATED COUNT: 13.4 % (ref 10–15)
ERYTHROCYTE [SEDIMENTATION RATE] IN BLOOD BY WESTERGREN METHOD: 36 MM/HR (ref 0–20)
FOLATE SERPL-MCNC: 5.8 NG/ML (ref 4.6–34.8)
GLUCOSE SERPL-MCNC: 131 MG/DL (ref 70–99)
HCT VFR BLD AUTO: 36.4 % (ref 40–53)
HGB BLD-MCNC: 11.7 G/DL (ref 13.3–17.7)
IMM GRANULOCYTES # BLD: 0.1 10E3/UL
IMM GRANULOCYTES NFR BLD: 1 %
LYMPHOCYTES # BLD AUTO: 1.3 10E3/UL (ref 0.8–5.3)
LYMPHOCYTES NFR BLD AUTO: 12 %
MCH RBC QN AUTO: 32.5 PG (ref 26.5–33)
MCHC RBC AUTO-ENTMCNC: 32.1 G/DL (ref 31.5–36.5)
MCV RBC AUTO: 101 FL (ref 78–100)
MONOCYTES # BLD AUTO: 0.8 10E3/UL (ref 0–1.3)
MONOCYTES NFR BLD AUTO: 7 %
NEUTROPHILS # BLD AUTO: 8.4 10E3/UL (ref 1.6–8.3)
NEUTROPHILS NFR BLD AUTO: 76 %
NRBC # BLD AUTO: 0 10E3/UL
NRBC BLD AUTO-RTO: 0 /100
PLATELET # BLD AUTO: 358 10E3/UL (ref 150–450)
POTASSIUM SERPL-SCNC: 4.1 MMOL/L (ref 3.4–5.3)
PROT SERPL-MCNC: 6.3 G/DL (ref 6.4–8.3)
RBC # BLD AUTO: 3.6 10E6/UL (ref 4.4–5.9)
SODIUM SERPL-SCNC: 141 MMOL/L (ref 135–145)
T4 FREE SERPL-MCNC: 1.46 NG/DL (ref 0.9–1.7)
TSH SERPL DL<=0.005 MIU/L-ACNC: 0.89 UIU/ML (ref 0.3–4.2)
VIT B12 SERPL-MCNC: 395 PG/ML (ref 232–1245)
WBC # BLD AUTO: 11 10E3/UL (ref 4–11)

## 2024-01-15 PROCEDURE — 85652 RBC SED RATE AUTOMATED: CPT | Mod: ORL | Performed by: INTERNAL MEDICINE

## 2024-01-15 PROCEDURE — 85025 COMPLETE CBC W/AUTO DIFF WBC: CPT | Mod: ORL | Performed by: INTERNAL MEDICINE

## 2024-01-15 PROCEDURE — 82746 ASSAY OF FOLIC ACID SERUM: CPT | Mod: ORL | Performed by: INTERNAL MEDICINE

## 2024-01-15 PROCEDURE — 80053 COMPREHEN METABOLIC PANEL: CPT | Mod: ORL | Performed by: INTERNAL MEDICINE

## 2024-01-15 PROCEDURE — 84439 ASSAY OF FREE THYROXINE: CPT | Mod: ORL | Performed by: INTERNAL MEDICINE

## 2024-01-15 PROCEDURE — 36415 COLL VENOUS BLD VENIPUNCTURE: CPT | Mod: ORL | Performed by: INTERNAL MEDICINE

## 2024-01-15 PROCEDURE — 82607 VITAMIN B-12: CPT | Mod: ORL | Performed by: INTERNAL MEDICINE

## 2024-01-15 PROCEDURE — 84443 ASSAY THYROID STIM HORMONE: CPT | Mod: ORL | Performed by: INTERNAL MEDICINE

## 2024-01-15 PROCEDURE — P9603 ONE-WAY ALLOW PRORATED MILES: HCPCS | Mod: ORL | Performed by: INTERNAL MEDICINE

## 2024-01-15 PROCEDURE — 86141 C-REACTIVE PROTEIN HS: CPT | Mod: ORL | Performed by: INTERNAL MEDICINE

## 2024-01-22 ENCOUNTER — LAB REQUISITION (OUTPATIENT)
Dept: LAB | Facility: CLINIC | Age: 89
End: 2024-01-22
Payer: MEDICARE

## 2024-01-22 DIAGNOSIS — E03.9 HYPOTHYROIDISM, UNSPECIFIED: ICD-10-CM

## 2024-01-22 DIAGNOSIS — D64.9 ANEMIA, UNSPECIFIED: ICD-10-CM

## 2024-01-22 DIAGNOSIS — D72.829 ELEVATED WHITE BLOOD CELL COUNT, UNSPECIFIED: ICD-10-CM

## 2024-01-22 LAB
BASOPHILS # BLD AUTO: 0.1 10E3/UL (ref 0–0.2)
BASOPHILS NFR BLD AUTO: 1 %
EOSINOPHIL # BLD AUTO: 0.3 10E3/UL (ref 0–0.7)
EOSINOPHIL NFR BLD AUTO: 3 %
ERYTHROCYTE [DISTWIDTH] IN BLOOD BY AUTOMATED COUNT: 13.2 % (ref 10–15)
HCT VFR BLD AUTO: 37.1 % (ref 40–53)
HGB BLD-MCNC: 12 G/DL (ref 13.3–17.7)
IMM GRANULOCYTES # BLD: 0.1 10E3/UL
IMM GRANULOCYTES NFR BLD: 1 %
LYMPHOCYTES # BLD AUTO: 1.5 10E3/UL (ref 0.8–5.3)
LYMPHOCYTES NFR BLD AUTO: 16 %
MCH RBC QN AUTO: 33.3 PG (ref 26.5–33)
MCHC RBC AUTO-ENTMCNC: 32.3 G/DL (ref 31.5–36.5)
MCV RBC AUTO: 103 FL (ref 78–100)
MONOCYTES # BLD AUTO: 0.8 10E3/UL (ref 0–1.3)
MONOCYTES NFR BLD AUTO: 8 %
NEUTROPHILS # BLD AUTO: 6.9 10E3/UL (ref 1.6–8.3)
NEUTROPHILS NFR BLD AUTO: 71 %
NRBC # BLD AUTO: 0 10E3/UL
NRBC BLD AUTO-RTO: 0 /100
PLATELET # BLD AUTO: 359 10E3/UL (ref 150–450)
RBC # BLD AUTO: 3.6 10E6/UL (ref 4.4–5.9)
RETICS # AUTO: 0.07 10E6/UL (ref 0.03–0.1)
RETICS/RBC NFR AUTO: 1.9 % (ref 0.5–2)
WBC # BLD AUTO: 9.6 10E3/UL (ref 4–11)

## 2024-01-22 PROCEDURE — 85045 AUTOMATED RETICULOCYTE COUNT: CPT | Mod: ORL | Performed by: INTERNAL MEDICINE

## 2024-01-22 PROCEDURE — 36415 COLL VENOUS BLD VENIPUNCTURE: CPT | Mod: ORL | Performed by: INTERNAL MEDICINE

## 2024-01-22 PROCEDURE — P9604 ONE-WAY ALLOW PRORATED TRIP: HCPCS | Mod: ORL | Performed by: INTERNAL MEDICINE

## 2024-01-22 PROCEDURE — 85025 COMPLETE CBC W/AUTO DIFF WBC: CPT | Mod: ORL | Performed by: INTERNAL MEDICINE

## 2024-01-23 LAB
PATH REPORT.COMMENTS IMP SPEC: NORMAL
PATH REPORT.COMMENTS IMP SPEC: NORMAL
PATH REPORT.FINAL DX SPEC: NORMAL
PATH REPORT.RELEVANT HX SPEC: NORMAL

## 2024-01-23 PROCEDURE — 99207 BLOOD MORPHOLOGY PATHOLOGIST REVIEW: CPT | Performed by: PATHOLOGY

## 2024-02-02 ENCOUNTER — LAB REQUISITION (OUTPATIENT)
Dept: LAB | Facility: CLINIC | Age: 89
End: 2024-02-02
Payer: MEDICARE

## 2024-02-02 DIAGNOSIS — R30.0 DYSURIA: ICD-10-CM

## 2024-02-02 LAB
ALBUMIN UR-MCNC: 30 MG/DL
AMORPH CRY #/AREA URNS HPF: ABNORMAL /HPF
APPEARANCE UR: ABNORMAL
BILIRUB UR QL STRIP: NEGATIVE
COLOR UR AUTO: ABNORMAL
GLUCOSE UR STRIP-MCNC: NEGATIVE MG/DL
HGB UR QL STRIP: NEGATIVE
KETONES UR STRIP-MCNC: NEGATIVE MG/DL
LEUKOCYTE ESTERASE UR QL STRIP: ABNORMAL
MUCOUS THREADS #/AREA URNS LPF: PRESENT /LPF
NITRATE UR QL: NEGATIVE
PH UR STRIP: 5.5 [PH] (ref 5–7)
RBC URINE: 0 /HPF
SP GR UR STRIP: 1.03 (ref 1–1.03)
UROBILINOGEN UR STRIP-MCNC: NORMAL MG/DL
WBC URINE: 0 /HPF

## 2024-02-02 PROCEDURE — 81001 URINALYSIS AUTO W/SCOPE: CPT | Mod: ORL

## 2024-02-02 PROCEDURE — 87086 URINE CULTURE/COLONY COUNT: CPT | Mod: ORL

## 2024-02-02 NOTE — PLAN OF CARE
PRIMARY DIAGNOSIS: GENERALIZED WEAKNESS    OUTPATIENT/OBSERVATION GOALS TO BE MET BEFORE DISCHARGE  1. Orthostatic performed: Yes:          Lying Orthostatic BP: 138/63         Sitting Orthostatic BP: 135/62               2. Tolerating PO medications: Yes    3. Return to near baseline physical activity: No    4. Cleared for discharge by consultants (if involved): No    Discharge Planner Nurse   Safe discharge environment identified: Yes  Barriers to discharge: Yes       Entered by: Luis Brooke RN 08/12/2022 9:53 PM     Please review provider order for any additional goals.   Nurse to notify provider when observation goals have been met and patient is ready for discharge.Goal Outcome Evaluation:      Pt is A&O x2, disoriented to time and situation and confused. VSS, reports pain 8/10 mostly neck stiffness, but declined intervention. Pt declined diner stating he's not hungry despite several attempts. Pt is on tele with NS with PVC.                   no

## 2024-02-03 LAB — BACTERIA UR CULT: NO GROWTH

## 2024-03-26 ENCOUNTER — APPOINTMENT (OUTPATIENT)
Dept: CT IMAGING | Facility: HOSPITAL | Age: 89
End: 2024-03-26
Attending: EMERGENCY MEDICINE
Payer: MEDICARE

## 2024-03-26 ENCOUNTER — APPOINTMENT (OUTPATIENT)
Dept: RADIOLOGY | Facility: HOSPITAL | Age: 89
End: 2024-03-26
Attending: EMERGENCY MEDICINE
Payer: MEDICARE

## 2024-03-26 ENCOUNTER — HOSPITAL ENCOUNTER (EMERGENCY)
Facility: HOSPITAL | Age: 89
Discharge: HOME | End: 2024-03-26
Attending: EMERGENCY MEDICINE | Admitting: EMERGENCY MEDICINE
Payer: MEDICARE

## 2024-03-26 VITALS
WEIGHT: 183 LBS | BODY MASS INDEX: 28.72 KG/M2 | HEIGHT: 67 IN | HEART RATE: 67 BPM | DIASTOLIC BLOOD PRESSURE: 73 MMHG | RESPIRATION RATE: 16 BRPM | SYSTOLIC BLOOD PRESSURE: 161 MMHG | TEMPERATURE: 97.9 F | OXYGEN SATURATION: 97 %
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
## 2024-03-26 PROBLEM — R41.3 MEMORY DEFICIT: Status: ACTIVE | Noted: 2022-09-20

## 2024-03-26 LAB
ANION GAP SERPL CALCULATED.3IONS-SCNC: 10 MMOL/L (ref 7–15)
BUN SERPL-MCNC: 16.6 MG/DL (ref 8–23)
CALCIUM SERPL-MCNC: 9 MG/DL (ref 8.8–10.2)
CHLORIDE SERPL-SCNC: 108 MMOL/L (ref 98–107)
CREAT SERPL-MCNC: 0.89 MG/DL (ref 0.67–1.17)
DEPRECATED HCO3 PLAS-SCNC: 24 MMOL/L (ref 22–29)
EGFRCR SERPLBLD CKD-EPI 2021: 83 ML/MIN/1.73M2
ERYTHROCYTE [DISTWIDTH] IN BLOOD BY AUTOMATED COUNT: 12.1 % (ref 10–15)
GLUCOSE SERPL-MCNC: 126 MG/DL (ref 70–99)
HCT VFR BLD AUTO: 39.2 % (ref 40–53)
HGB BLD-MCNC: 12.6 G/DL (ref 13.3–17.7)
MAGNESIUM SERPL-MCNC: 2.2 MG/DL (ref 1.7–2.3)
MCH RBC QN AUTO: 31.8 PG (ref 26.5–33)
MCHC RBC AUTO-ENTMCNC: 32.1 G/DL (ref 31.5–36.5)
MCV RBC AUTO: 99 FL (ref 78–100)
PLATELET # BLD AUTO: 310 10E3/UL (ref 150–450)
POTASSIUM SERPL-SCNC: 4.3 MMOL/L (ref 3.4–5.3)
RBC # BLD AUTO: 3.96 10E6/UL (ref 4.4–5.9)
SODIUM SERPL-SCNC: 142 MMOL/L (ref 135–145)
TROPONIN T SERPL HS-MCNC: 16 NG/L
WBC # BLD AUTO: 8.9 10E3/UL (ref 4–11)

## 2024-03-26 PROCEDURE — 83735 ASSAY OF MAGNESIUM: CPT | Performed by: EMERGENCY MEDICINE

## 2024-03-26 PROCEDURE — 36415 COLL VENOUS BLD VENIPUNCTURE: CPT | Performed by: EMERGENCY MEDICINE

## 2024-03-26 PROCEDURE — 93005 ELECTROCARDIOGRAM TRACING: CPT | Performed by: EMERGENCY MEDICINE

## 2024-03-26 PROCEDURE — 85027 COMPLETE CBC AUTOMATED: CPT | Performed by: EMERGENCY MEDICINE

## 2024-03-26 PROCEDURE — 73610 X-RAY EXAM OF ANKLE: CPT | Mod: LT

## 2024-03-26 PROCEDURE — 72125 CT NECK SPINE W/O DYE: CPT | Mod: ME

## 2024-03-26 PROCEDURE — 99285 EMERGENCY DEPT VISIT HI MDM: CPT | Mod: 25

## 2024-03-26 PROCEDURE — 250N000013 HC RX MED GY IP 250 OP 250 PS 637: Performed by: EMERGENCY MEDICINE

## 2024-03-26 PROCEDURE — 70450 CT HEAD/BRAIN W/O DYE: CPT | Mod: MG

## 2024-03-26 PROCEDURE — 84484 ASSAY OF TROPONIN QUANT: CPT | Performed by: EMERGENCY MEDICINE

## 2024-03-26 PROCEDURE — 80048 BASIC METABOLIC PNL TOTAL CA: CPT | Performed by: EMERGENCY MEDICINE

## 2024-03-26 RX ORDER — ACETAMINOPHEN 325 MG/1
975 TABLET ORAL ONCE
Status: COMPLETED | OUTPATIENT
Start: 2024-03-26 | End: 2024-03-26

## 2024-03-26 RX ADMIN — ACETAMINOPHEN 975 MG: 325 TABLET ORAL at 08:27

## 2024-03-26 ASSESSMENT — ACTIVITIES OF DAILY LIVING (ADL)
ADLS_ACUITY_SCORE: 38
ADLS_ACUITY_SCORE: 38

## 2024-03-26 NOTE — ED NOTES
Bed: JNED-26  Expected date: 3/26/24  Expected time: 8:05 AM  Means of arrival:   Comments:  [place]/fall/ankle pain

## 2024-03-26 NOTE — ED NOTES
Pt required assistance to sit at the edge of the bed, was able to stand up and ambulated to the paez and back requiring a walker. Pt stated that he fills that it is more difficult to get around than he usually does. PT states he has a walker at his apt. Pt stated that the address listed is where he used to live. Calling Niece.

## 2024-03-26 NOTE — ED TRIAGE NOTES
BIBA from memory care.  PT found down in his room unsure of fall. C\O left ankle pain, and chronic back pain.     Triage Assessment (Adult)       Row Name 03/26/24 0815          Triage Assessment    Airway WDL WDL        Respiratory WDL    Respiratory WDL WDL        Skin Circulation/Temperature WDL    Skin Circulation/Temperature WDL WDL        Cardiac WDL    Cardiac WDL WDL        Peripheral/Neurovascular WDL    Peripheral Neurovascular WDL WDL        Cognitive/Neuro/Behavioral WDL    Cognitive/Neuro/Behavioral WDL WDL  baseline mentation

## 2024-03-26 NOTE — ED PROVIDER NOTES
EMERGENCY DEPARTMENT ENCOUNTER      NAME: George R Milligan  AGE: 88 year old male  YOB: 1935  MRN: [id number]  EVALUATION DATE & TIME: 3/26/2024  8:12 AM    PCP: Blu Chen    ED PROVIDER: Laura Lorenzana MD    Chief Complaint   Patient presents with    Ankle Pain    Fall         FINAL IMPRESSION:  1. Fall, initial encounter          ED COURSE & MEDICAL DECISION MAKING:    Pertinent Labs & Imaging studies reviewed. (See chart for details)  88 year old male with history of HTN, HLD, CAD status post CABG, dementia and memory care who presents to the Emergency Department for evaluation of unwitnessed fall, found down in his room at [facility].  Patient able to recall the fall this morning stating that he just remembers toppling over but does not recall why he fell.  Initially had complained of some left-sided ankle pain to EMS, but I am not able to reproduce any of this pain on exam and his ankle is atraumatic.  Unclear what caused him to fall so we will obtain EKG and labs to evaluate for arrhythmia, symptomatic anemia though I suspect that this is likely mechanical as he remembers toppling over and being unsteady and does have history of frequent falls.  Will obtain imaging of the head and cervical spine to rule out ICH or C-spine trauma as well as imaging of the left ankle despite reassuring exam to rule out fracture.    Patient placed on monitor, IV established and blood obtained.  Given Tylenol.  Twelve-lead EKG sinus rhythm with first-degree AV block unchanged.  CBC, BMP, magnesium, troponin notable for chronic anemia.  CT head, cervical spine unremarkable.  X-ray left ankle unremarkable.  Ambulate ambulate with walker and will be discharged back to [facility].       ED Course as of 03/26/24 0948   Tue Mar 26, 2024   0812 Met with and introduced myself to the patient. Discussed history and plan of care.   0835 Hemoglobin(!): 12.6  chronic   0903 XR Ankle Left G/E 3 Views  X-ray  independently interpreted by myself.  Atherosclerosis.  No evidence of acute fracture   0905 CT Head w/o Contrast  CT head independently interpreted by myself without visualized ICH       Medical Decision Making    History:  Supplemental history from: EMS  External Record(s) reviewed: 12/31/2023 CT head and previous EKG    Work Up:  Chart documentation includes differential considered and any EKGs or imaging independently interpreted by provider, see MDM  In additional to work up documented, I considered the following work up: see MDM    External consultation:  Discussion of management with another provider: N/A    Complicating factors:  Care impacted by chronic illness: Hypertension, HLD, CAD, dementia  Care affected by social determinants of health: Access to care referred to ED by care facility    Disposition considerations: Discharge. No recommendations on prescription strength medication(s). I considered admission, but ultimately discharged patient after reassuring ED workup and ability to ambulate safely.        At the conclusion of the encounter I discussed the results of all of the tests and the disposition. The questions were answered. The patient or family acknowledged understanding and was agreeable with the care plan.      MEDICATIONS GIVEN IN THE EMERGENCY:  Medications   acetaminophen (TYLENOL) tablet 975 mg (975 mg Oral $Given 3/26/24 0827)       NEW PRESCRIPTIONS STARTED AT TODAY'S ER VISIT  New Prescriptions    No medications on file          =================================================================    HPI    Patient information was obtained from: Patient, EMS    Use of Intrepreter: N/A      George R Milligan is a 88 year old male with pertinent medical history of hypertension, s/p CABG, atrioventricular block, and hypercholesterolemia who presents to the ED for evaluation of a fall.    Per EMS, the patient comes from [facility] where he was found on the ground this morning after having an  unwitnessed fall. It is unknown if he hit his head, but he is not on any blood thinning medication.    The patient reports that this morning he fell onto the floor, but is unsure why or how he fell. Endorses left ankle pain. Notes some chronic lower back pain, but this is unchanged as of today. He is not on any blood thinning medication.    Denies headache or any other complaints at this time.    PAST MEDICAL HISTORY:  History reviewed. No pertinent past medical history.    PAST SURGICAL HISTORY:  History reviewed. No pertinent surgical history.    CURRENT MEDICATIONS:    Prior to Admission Medications   Prescriptions Last Dose Informant Patient Reported? Taking?   acetaminophen (TYLENOL) 500 MG tablet   Yes No   Sig: Take 1,000 mg by mouth 3 times daily   aspirin 81 mg chewable tablet   Yes No   Sig: [ASPIRIN 81 MG CHEWABLE TABLET] Chew 81 mg daily.   cholecalciferol 50 MCG (2000 UT) tablet   Yes No   Sig: Take 2,000 Units by mouth daily   desonide (DESOWEN) 0.05 % external cream   Yes No   Sig: Apply topically 2 times daily as needed (Rash/dry skin around eyebrows and behind ears)   guaiFENesin (MUCINEX) 600 MG 12 hr tablet   Yes No   Sig: Take 1,200 mg by mouth 2 times daily as needed for congestion   ketoconazole (NIZORAL) 2 % external shampoo   Yes No   Sig: Apply topically twice a week On Mondays and Fridays   loperamide (IMODIUM) 2 MG capsule   Yes No   Sig: Take 2 mg by mouth 3 times daily as needed for diarrhea   losartan (COZAAR) 25 MG tablet   Yes No   Sig: Take 25 mg by mouth daily   potassium chloride ER (KLOR-CON M) 10 MEQ CR tablet   Yes No   Sig: Take 10 mEq by mouth daily   rosuvastatin (CRESTOR) 5 MG tablet   No No   Sig: Take 1 tablet (5 mg) by mouth daily      Facility-Administered Medications: None       ALLERGIES:  Allergies   Allergen Reactions    Atorvastatin Muscle Pain (Myalgia)    Fibrates Muscle Pain (Myalgia)     myalgias    Lopid [Gemfibrozil] Muscle Pain (Myalgia)    Pravachol  "[Pravastatin] Unknown    Statins Muscle Pain (Myalgia)       FAMILY HISTORY:  History reviewed. No pertinent family history.    SOCIAL HISTORY:        VITALS:  Patient Vitals for the past 24 hrs:   BP Temp Temp src Pulse Resp SpO2 Height Weight   03/26/24 0915 (!) 161/73 -- -- 67 16 97 % -- --   03/26/24 0900 (!) 173/80 -- -- 73 19 96 % -- --   03/26/24 0830 (!) 161/87 -- -- 91 17 97 % -- --   03/26/24 0815 (!) 184/82 97.9  F (36.6  C) Oral 75 10 97 % -- --   03/26/24 0813 -- -- -- -- -- -- 1.702 m (5' 7\") 83 kg (183 lb)       PHYSICAL EXAM    General Appearance: Well-appearing, well-nourished, no acute distress   Head:  Normocephalic, atraumatic  Eyes:  PERRL, conjunctiva/corneas clear  ENT:  membranes are moist without pallor, no tenderness palpation of the bony facial anatomy  Neck:  Supple, symmetrical, trachea midline, no adenopathy  Chest:  No tenderness or deformity, no crepitus. Previous sternotomy.   Cardio:  Regular rate and rhythm, no murmur/gallop/rub, 2+ pulses symmetric in all extremities. Hypertensive.  Pulm:  No respiratory distress, clear to auscultation bilaterally  Back:  No midline tenderness to palpation, no paraspinal tenderness  Abdomen:  Soft, non-tender, non distended,no rebound or guarding.  Extremities:  Extremities normal, no cyanosis or edema, full ROM and motor tone intact, bilateral pulses intact upper and lower. No reproducible left ankle pain. No obvious signs of trauma.   Skin:  Skin warm, dry, no rashes  Neuro:  Moving all extremities, no gross sensory defects. Alert and oriented to person and place, but he thinks that it is 2021.     RADIOLOGY/LABS:  Reviewed all pertinent imaging. Please see official radiology report. All pertinent labs reviewed and interpreted.    Results for orders placed or performed during the hospital encounter of 03/26/24   CT Head w/o Contrast    Impression    IMPRESSION:  HEAD CT:  1.  No acute intracranial abnormality.  2.  No calvarial or skull base " fracture.    CERVICAL SPINE CT:  1.  No CT evidence for acute fracture or post traumatic subluxation.   CT Cervical Spine w/o Contrast    Impression    IMPRESSION:  HEAD CT:  1.  No acute intracranial abnormality.  2.  No calvarial or skull base fracture.    CERVICAL SPINE CT:  1.  No CT evidence for acute fracture or post traumatic subluxation.   XR Ankle Left G/E 3 Views    Impression    IMPRESSION: Diffuse osseous demineralization. Normal joint spaces and alignment. No acute fracture. Plantar calcaneal enthesophyte.   Basic metabolic panel   Result Value Ref Range    Sodium 142 135 - 145 mmol/L    Potassium 4.3 3.4 - 5.3 mmol/L    Chloride 108 (H) 98 - 107 mmol/L    Carbon Dioxide (CO2) 24 22 - 29 mmol/L    Anion Gap 10 7 - 15 mmol/L    Urea Nitrogen 16.6 8.0 - 23.0 mg/dL    Creatinine 0.89 0.67 - 1.17 mg/dL    GFR Estimate 83 >60 mL/min/1.73m2    Calcium 9.0 8.8 - 10.2 mg/dL    Glucose 126 (H) 70 - 99 mg/dL   Result Value Ref Range    Magnesium 2.2 1.7 - 2.3 mg/dL   CBC with platelets   Result Value Ref Range    WBC Count 8.9 4.0 - 11.0 10e3/uL    RBC Count 3.96 (L) 4.40 - 5.90 10e6/uL    Hemoglobin 12.6 (L) 13.3 - 17.7 g/dL    Hematocrit 39.2 (L) 40.0 - 53.0 %    MCV 99 78 - 100 fL    MCH 31.8 26.5 - 33.0 pg    MCHC 32.1 31.5 - 36.5 g/dL    RDW 12.1 10.0 - 15.0 %    Platelet Count 310 150 - 450 10e3/uL   Result Value Ref Range    Troponin T, High Sensitivity 16 <=22 ng/L       EKG:  Performed at: 08:25, 26-Mar-2024    Impression: Sinus rhythm with 1st degree AV block. Nonspecific ST and T wave abnormality. Abnormal ECG.    Rate: 92 bpm  Rhythm: Sinus rhythm  Axis: 17  SD Interval: 248  QRS Interval: 98  QTc Interval: 442  ST Changes: NA  Comparison: When compared with ECG of 31-Dec-2023, PVC resolved.     I have independently reviewed and interpreted the EKG(s) documented above.      The creation of this record is based on the scribe s observations of the work being performed by Laura Lorenzana MD and the  provider s statements to them. It was created on her behalf by Vanessa Keys, a trained medical scribe. This document has been checked and approved by the attending provider.    Laura Lorenzana MD  Emergency Medicine  [facility] EMERGENCY DEPARTMENT  [address] [place] [id number] [phone]  Dept: [phone]     Laura Lorenzana MD  03/26/24 0927

## 2024-03-26 NOTE — DISCHARGE INSTRUCTIONS
CT of the head, cervical spine and x-ray of the left ankle unremarkable.  EKG and blood work unremarkable.

## 2024-03-31 LAB
ATRIAL RATE - MUSE: 92 BPM
DIASTOLIC BLOOD PRESSURE - MUSE: 82 MMHG
INTERPRETATION ECG - MUSE: NORMAL
P AXIS - MUSE: 56 DEGREES
PR INTERVAL - MUSE: 248 MS
QRS DURATION - MUSE: 98 MS
QT - MUSE: 358 MS
QTC - MUSE: 442 MS
R AXIS - MUSE: 17 DEGREES
SYSTOLIC BLOOD PRESSURE - MUSE: 184 MMHG
T AXIS - MUSE: -23 DEGREES
VENTRICULAR RATE- MUSE: 92 BPM

## 2024-04-17 ENCOUNTER — LAB REQUISITION (OUTPATIENT)
Dept: LAB | Facility: CLINIC | Age: 89
End: 2024-04-17
Payer: MEDICARE

## 2024-04-17 DIAGNOSIS — R30.0 DYSURIA: ICD-10-CM

## 2024-04-17 LAB
ALBUMIN UR-MCNC: 10 MG/DL
APPEARANCE UR: ABNORMAL
BILIRUB UR QL STRIP: NEGATIVE
COLOR UR AUTO: ABNORMAL
GLUCOSE UR STRIP-MCNC: NEGATIVE MG/DL
HGB UR QL STRIP: NEGATIVE
KETONES UR STRIP-MCNC: NEGATIVE MG/DL
LEUKOCYTE ESTERASE UR QL STRIP: ABNORMAL
MUCOUS THREADS #/AREA URNS LPF: PRESENT /LPF
NITRATE UR QL: NEGATIVE
PH UR STRIP: 5.5 [PH] (ref 5–7)
RBC URINE: 0 /HPF
SP GR UR STRIP: 1.03 (ref 1–1.03)
URATE CRY #/AREA URNS HPF: ABNORMAL /HPF
UROBILINOGEN UR STRIP-MCNC: NORMAL MG/DL
WBC URINE: 1 /HPF

## 2024-04-17 PROCEDURE — 87086 URINE CULTURE/COLONY COUNT: CPT | Mod: ORL | Performed by: GENERAL ACUTE CARE HOSPITAL

## 2024-04-17 PROCEDURE — 81001 URINALYSIS AUTO W/SCOPE: CPT | Mod: ORL | Performed by: GENERAL ACUTE CARE HOSPITAL

## 2024-04-19 ENCOUNTER — LAB REQUISITION (OUTPATIENT)
Dept: LAB | Facility: CLINIC | Age: 89
End: 2024-04-19
Payer: MEDICARE

## 2024-04-19 DIAGNOSIS — F03.90 UNSPECIFIED DEMENTIA, UNSPECIFIED SEVERITY, WITHOUT BEHAVIORAL DISTURBANCE, PSYCHOTIC DISTURBANCE, MOOD DISTURBANCE, AND ANXIETY (H): ICD-10-CM

## 2024-04-19 LAB — BACTERIA UR CULT: NORMAL

## 2024-04-22 LAB
ERYTHROCYTE [DISTWIDTH] IN BLOOD BY AUTOMATED COUNT: 12.6 % (ref 10–15)
HCT VFR BLD AUTO: 41.9 % (ref 40–53)
HGB BLD-MCNC: 13.6 G/DL (ref 13.3–17.7)
MCH RBC QN AUTO: 32.8 PG (ref 26.5–33)
MCHC RBC AUTO-ENTMCNC: 32.5 G/DL (ref 31.5–36.5)
MCV RBC AUTO: 101 FL (ref 78–100)
PLATELET # BLD AUTO: 377 10E3/UL (ref 150–450)
RBC # BLD AUTO: 4.15 10E6/UL (ref 4.4–5.9)
WBC # BLD AUTO: 11.6 10E3/UL (ref 4–11)

## 2024-04-22 PROCEDURE — P9604 ONE-WAY ALLOW PRORATED TRIP: HCPCS | Mod: ORL | Performed by: INTERNAL MEDICINE

## 2024-04-22 PROCEDURE — 36415 COLL VENOUS BLD VENIPUNCTURE: CPT | Mod: ORL | Performed by: INTERNAL MEDICINE

## 2024-04-22 PROCEDURE — 80053 COMPREHEN METABOLIC PANEL: CPT | Mod: ORL | Performed by: INTERNAL MEDICINE

## 2024-04-22 PROCEDURE — 85027 COMPLETE CBC AUTOMATED: CPT | Mod: ORL | Performed by: INTERNAL MEDICINE

## 2024-04-23 LAB
ALBUMIN SERPL BCG-MCNC: 4 G/DL (ref 3.5–5.2)
ALP SERPL-CCNC: 123 U/L (ref 40–150)
ALT SERPL W P-5'-P-CCNC: 17 U/L (ref 0–70)
ANION GAP SERPL CALCULATED.3IONS-SCNC: 14 MMOL/L (ref 7–15)
AST SERPL W P-5'-P-CCNC: 20 U/L (ref 0–45)
BILIRUB SERPL-MCNC: 0.2 MG/DL
BUN SERPL-MCNC: 18.7 MG/DL (ref 8–23)
CALCIUM SERPL-MCNC: 9.3 MG/DL (ref 8.8–10.2)
CHLORIDE SERPL-SCNC: 105 MMOL/L (ref 98–107)
CREAT SERPL-MCNC: 0.92 MG/DL (ref 0.67–1.17)
DEPRECATED HCO3 PLAS-SCNC: 22 MMOL/L (ref 22–29)
EGFRCR SERPLBLD CKD-EPI 2021: 81 ML/MIN/1.73M2
GLUCOSE SERPL-MCNC: 52 MG/DL (ref 70–99)
POTASSIUM SERPL-SCNC: 4 MMOL/L (ref 3.4–5.3)
PROT SERPL-MCNC: 6.8 G/DL (ref 6.4–8.3)
SODIUM SERPL-SCNC: 141 MMOL/L (ref 135–145)

## 2024-06-27 ENCOUNTER — LAB REQUISITION (OUTPATIENT)
Dept: LAB | Facility: CLINIC | Age: 89
End: 2024-06-27
Payer: MEDICARE

## 2024-06-27 DIAGNOSIS — R73.03 PREDIABETES: ICD-10-CM

## 2024-06-27 DIAGNOSIS — F03.90 UNSPECIFIED DEMENTIA, UNSPECIFIED SEVERITY, WITHOUT BEHAVIORAL DISTURBANCE, PSYCHOTIC DISTURBANCE, MOOD DISTURBANCE, AND ANXIETY (H): ICD-10-CM

## 2024-06-27 DIAGNOSIS — E78.5 HYPERLIPIDEMIA, UNSPECIFIED: ICD-10-CM

## 2024-07-01 LAB
ALBUMIN SERPL BCG-MCNC: 3.7 G/DL (ref 3.5–5.2)
ALP SERPL-CCNC: 115 U/L (ref 40–150)
ALT SERPL W P-5'-P-CCNC: 11 U/L (ref 0–70)
ANION GAP SERPL CALCULATED.3IONS-SCNC: 13 MMOL/L (ref 7–15)
AST SERPL W P-5'-P-CCNC: 18 U/L (ref 0–45)
BILIRUB SERPL-MCNC: 0.3 MG/DL
BUN SERPL-MCNC: 17.4 MG/DL (ref 8–23)
CALCIUM SERPL-MCNC: 9.2 MG/DL (ref 8.8–10.2)
CHLORIDE SERPL-SCNC: 105 MMOL/L (ref 98–107)
CHOLEST SERPL-MCNC: 129 MG/DL
CREAT SERPL-MCNC: 0.82 MG/DL (ref 0.67–1.17)
DEPRECATED HCO3 PLAS-SCNC: 23 MMOL/L (ref 22–29)
EGFRCR SERPLBLD CKD-EPI 2021: 85 ML/MIN/1.73M2
ERYTHROCYTE [DISTWIDTH] IN BLOOD BY AUTOMATED COUNT: 12.7 % (ref 10–15)
FASTING STATUS PATIENT QL REPORTED: NO
GLUCOSE SERPL-MCNC: 152 MG/DL (ref 70–99)
HBA1C MFR BLD: 6 %
HCT VFR BLD AUTO: 37 % (ref 40–53)
HDLC SERPL-MCNC: 44 MG/DL
HGB BLD-MCNC: 12.2 G/DL (ref 13.3–17.7)
LDLC SERPL CALC-MCNC: 53 MG/DL
MCH RBC QN AUTO: 32.7 PG (ref 26.5–33)
MCHC RBC AUTO-ENTMCNC: 33 G/DL (ref 31.5–36.5)
MCV RBC AUTO: 99 FL (ref 78–100)
NONHDLC SERPL-MCNC: 85 MG/DL
PLATELET # BLD AUTO: 356 10E3/UL (ref 150–450)
POTASSIUM SERPL-SCNC: 4 MMOL/L (ref 3.4–5.3)
PROT SERPL-MCNC: 6.4 G/DL (ref 6.4–8.3)
RBC # BLD AUTO: 3.73 10E6/UL (ref 4.4–5.9)
SODIUM SERPL-SCNC: 141 MMOL/L (ref 135–145)
TRIGL SERPL-MCNC: 158 MG/DL
TSH SERPL DL<=0.005 MIU/L-ACNC: 1.09 UIU/ML (ref 0.3–4.2)
VIT B12 SERPL-MCNC: 312 PG/ML (ref 232–1245)
WBC # BLD AUTO: 8.9 10E3/UL (ref 4–11)

## 2024-07-01 PROCEDURE — 36415 COLL VENOUS BLD VENIPUNCTURE: CPT | Mod: ORL | Performed by: INTERNAL MEDICINE

## 2024-07-01 PROCEDURE — 83036 HEMOGLOBIN GLYCOSYLATED A1C: CPT | Mod: ORL | Performed by: INTERNAL MEDICINE

## 2024-07-01 PROCEDURE — 84443 ASSAY THYROID STIM HORMONE: CPT | Mod: ORL | Performed by: INTERNAL MEDICINE

## 2024-07-01 PROCEDURE — 82607 VITAMIN B-12: CPT | Mod: ORL | Performed by: INTERNAL MEDICINE

## 2024-07-01 PROCEDURE — 80053 COMPREHEN METABOLIC PANEL: CPT | Mod: ORL | Performed by: INTERNAL MEDICINE

## 2024-07-01 PROCEDURE — P9604 ONE-WAY ALLOW PRORATED TRIP: HCPCS | Mod: ORL | Performed by: INTERNAL MEDICINE

## 2024-07-01 PROCEDURE — 85027 COMPLETE CBC AUTOMATED: CPT | Mod: ORL | Performed by: INTERNAL MEDICINE

## 2024-07-01 PROCEDURE — 80061 LIPID PANEL: CPT | Mod: ORL | Performed by: INTERNAL MEDICINE

## 2024-08-02 ENCOUNTER — LAB REQUISITION (OUTPATIENT)
Dept: LAB | Facility: CLINIC | Age: 89
End: 2024-08-02
Payer: MEDICARE

## 2024-08-02 DIAGNOSIS — R30.0 DYSURIA: ICD-10-CM

## 2024-08-02 LAB
ALBUMIN UR-MCNC: NEGATIVE MG/DL
APPEARANCE UR: CLEAR
BILIRUB UR QL STRIP: NEGATIVE
COLOR UR AUTO: NORMAL
GLUCOSE UR STRIP-MCNC: NEGATIVE MG/DL
HGB UR QL STRIP: NEGATIVE
KETONES UR STRIP-MCNC: NEGATIVE MG/DL
LEUKOCYTE ESTERASE UR QL STRIP: NEGATIVE
NITRATE UR QL: NEGATIVE
PH UR STRIP: 5 [PH] (ref 5–7)
RBC URINE: <1 /HPF
SP GR UR STRIP: 1.01 (ref 1–1.03)
UROBILINOGEN UR STRIP-MCNC: NORMAL MG/DL
WBC URINE: 0 /HPF

## 2024-08-02 PROCEDURE — 87086 URINE CULTURE/COLONY COUNT: CPT | Mod: ORL | Performed by: INTERNAL MEDICINE

## 2024-08-02 PROCEDURE — 81001 URINALYSIS AUTO W/SCOPE: CPT | Mod: ORL | Performed by: INTERNAL MEDICINE

## 2024-08-04 LAB — BACTERIA UR CULT: NORMAL

## 2025-02-18 ENCOUNTER — HOSPITAL ENCOUNTER (EMERGENCY)
Facility: HOSPITAL | Age: 89
Discharge: NURSING, REHAB OR LONG-TERM CARE | End: 2025-02-19
Attending: FAMILY MEDICINE
Payer: MEDICARE

## 2025-02-18 ENCOUNTER — APPOINTMENT (OUTPATIENT)
Dept: CT IMAGING | Facility: HOSPITAL | Age: 89
End: 2025-02-18
Attending: FAMILY MEDICINE
Payer: MEDICARE

## 2025-02-18 DIAGNOSIS — R40.4 SPELL OF ALTERED CONSCIOUSNESS: ICD-10-CM

## 2025-02-18 DIAGNOSIS — R31.29 MICROSCOPIC HEMATURIA: ICD-10-CM

## 2025-02-18 DIAGNOSIS — I10 ESSENTIAL HYPERTENSION: ICD-10-CM

## 2025-02-18 LAB
ALBUMIN UR-MCNC: NEGATIVE MG/DL
ANION GAP SERPL CALCULATED.3IONS-SCNC: 10 MMOL/L (ref 7–15)
APPEARANCE UR: CLEAR
BACTERIA #/AREA URNS HPF: ABNORMAL /HPF
BASE EXCESS BLDV CALC-SCNC: 4.2 MMOL/L (ref -3–3)
BASOPHILS # BLD AUTO: 0 10E3/UL (ref 0–0.2)
BASOPHILS NFR BLD AUTO: 0 %
BILIRUB UR QL STRIP: NEGATIVE
BUN SERPL-MCNC: 24.9 MG/DL (ref 8–23)
CALCIUM SERPL-MCNC: 9.3 MG/DL (ref 8.8–10.4)
CHLORIDE SERPL-SCNC: 103 MMOL/L (ref 98–107)
COLOR UR AUTO: COLORLESS
CREAT SERPL-MCNC: 1.05 MG/DL (ref 0.67–1.17)
EGFRCR SERPLBLD CKD-EPI 2021: 68 ML/MIN/1.73M2
EOSINOPHIL # BLD AUTO: 0.4 10E3/UL (ref 0–0.7)
EOSINOPHIL NFR BLD AUTO: 4 %
ERYTHROCYTE [DISTWIDTH] IN BLOOD BY AUTOMATED COUNT: 12.2 % (ref 10–15)
FLUAV RNA SPEC QL NAA+PROBE: NEGATIVE
FLUBV RNA RESP QL NAA+PROBE: NEGATIVE
GLUCOSE SERPL-MCNC: 117 MG/DL (ref 70–99)
GLUCOSE UR STRIP-MCNC: NEGATIVE MG/DL
HCO3 BLDV-SCNC: 30 MMOL/L (ref 21–28)
HCO3 SERPL-SCNC: 26 MMOL/L (ref 22–29)
HCT VFR BLD AUTO: 38 % (ref 40–53)
HGB BLD-MCNC: 12.2 G/DL (ref 13.3–17.7)
HGB UR QL STRIP: ABNORMAL
IMM GRANULOCYTES # BLD: 0.1 10E3/UL
IMM GRANULOCYTES NFR BLD: 1 %
KETONES UR STRIP-MCNC: NEGATIVE MG/DL
LEUKOCYTE ESTERASE UR QL STRIP: NEGATIVE
LYMPHOCYTES # BLD AUTO: 1.8 10E3/UL (ref 0.8–5.3)
LYMPHOCYTES NFR BLD AUTO: 18 %
MAGNESIUM SERPL-MCNC: 2.1 MG/DL (ref 1.7–2.3)
MCH RBC QN AUTO: 30.8 PG (ref 26.5–33)
MCHC RBC AUTO-ENTMCNC: 32.1 G/DL (ref 31.5–36.5)
MCV RBC AUTO: 96 FL (ref 78–100)
MONOCYTES # BLD AUTO: 1 10E3/UL (ref 0–1.3)
MONOCYTES NFR BLD AUTO: 10 %
NEUTROPHILS # BLD AUTO: 7 10E3/UL (ref 1.6–8.3)
NEUTROPHILS NFR BLD AUTO: 67 %
NITRATE UR QL: NEGATIVE
NRBC # BLD AUTO: 0 10E3/UL
NRBC BLD AUTO-RTO: 0 /100
NT-PROBNP SERPL-MCNC: 399 PG/ML (ref 0–1800)
O2/TOTAL GAS SETTING VFR VENT: 21 %
OXYHGB MFR BLDV: 64 % (ref 70–75)
PCO2 BLDV: 47 MM HG (ref 40–50)
PH BLDV: 7.41 [PH] (ref 7.32–7.43)
PH UR STRIP: 6.5 [PH] (ref 5–7)
PLATELET # BLD AUTO: 412 10E3/UL (ref 150–450)
PO2 BLDV: 34 MM HG (ref 25–47)
POTASSIUM SERPL-SCNC: 5.4 MMOL/L (ref 3.4–5.3)
RBC # BLD AUTO: 3.96 10E6/UL (ref 4.4–5.9)
RBC URINE: 6 /HPF
RSV RNA SPEC NAA+PROBE: NEGATIVE
SAO2 % BLDV: 64.9 % (ref 70–75)
SARS-COV-2 RNA RESP QL NAA+PROBE: NEGATIVE
SODIUM SERPL-SCNC: 139 MMOL/L (ref 135–145)
SP GR UR STRIP: 1.01 (ref 1–1.03)
TROPONIN T SERPL HS-MCNC: 17 NG/L
UROBILINOGEN UR STRIP-MCNC: <2 MG/DL
WBC # BLD AUTO: 10.4 10E3/UL (ref 4–11)
WBC URINE: 1 /HPF

## 2025-02-18 PROCEDURE — 81003 URINALYSIS AUTO W/O SCOPE: CPT | Performed by: FAMILY MEDICINE

## 2025-02-18 PROCEDURE — 85004 AUTOMATED DIFF WBC COUNT: CPT | Performed by: FAMILY MEDICINE

## 2025-02-18 PROCEDURE — 84484 ASSAY OF TROPONIN QUANT: CPT | Performed by: FAMILY MEDICINE

## 2025-02-18 PROCEDURE — 82565 ASSAY OF CREATININE: CPT | Performed by: FAMILY MEDICINE

## 2025-02-18 PROCEDURE — 82805 BLOOD GASES W/O2 SATURATION: CPT | Performed by: FAMILY MEDICINE

## 2025-02-18 PROCEDURE — 70450 CT HEAD/BRAIN W/O DYE: CPT

## 2025-02-18 PROCEDURE — 99285 EMERGENCY DEPT VISIT HI MDM: CPT | Mod: 25

## 2025-02-18 PROCEDURE — 87637 SARSCOV2&INF A&B&RSV AMP PRB: CPT | Performed by: FAMILY MEDICINE

## 2025-02-18 PROCEDURE — 80048 BASIC METABOLIC PNL TOTAL CA: CPT | Performed by: FAMILY MEDICINE

## 2025-02-18 PROCEDURE — 93005 ELECTROCARDIOGRAM TRACING: CPT | Performed by: FAMILY MEDICINE

## 2025-02-18 PROCEDURE — 85014 HEMATOCRIT: CPT | Performed by: FAMILY MEDICINE

## 2025-02-18 PROCEDURE — 83880 ASSAY OF NATRIURETIC PEPTIDE: CPT | Performed by: FAMILY MEDICINE

## 2025-02-18 PROCEDURE — 36415 COLL VENOUS BLD VENIPUNCTURE: CPT | Performed by: FAMILY MEDICINE

## 2025-02-18 PROCEDURE — 83735 ASSAY OF MAGNESIUM: CPT | Performed by: FAMILY MEDICINE

## 2025-02-18 RX ORDER — TRAZODONE HYDROCHLORIDE 50 MG/1
50 TABLET ORAL AT BEDTIME
COMMUNITY

## 2025-02-18 ASSESSMENT — ACTIVITIES OF DAILY LIVING (ADL)
ADLS_ACUITY_SCORE: 54

## 2025-02-18 ASSESSMENT — COLUMBIA-SUICIDE SEVERITY RATING SCALE - C-SSRS
1. IN THE PAST MONTH, HAVE YOU WISHED YOU WERE DEAD OR WISHED YOU COULD GO TO SLEEP AND NOT WAKE UP?: NO
6. HAVE YOU EVER DONE ANYTHING, STARTED TO DO ANYTHING, OR PREPARED TO DO ANYTHING TO END YOUR LIFE?: NO
2. HAVE YOU ACTUALLY HAD ANY THOUGHTS OF KILLING YOURSELF IN THE PAST MONTH?: NO

## 2025-02-19 VITALS
OXYGEN SATURATION: 95 % | BODY MASS INDEX: 29.03 KG/M2 | TEMPERATURE: 97.9 F | HEIGHT: 67 IN | RESPIRATION RATE: 21 BRPM | DIASTOLIC BLOOD PRESSURE: 66 MMHG | WEIGHT: 185 LBS | SYSTOLIC BLOOD PRESSURE: 159 MMHG | HEART RATE: 96 BPM

## 2025-02-19 LAB — TROPONIN T SERPL HS-MCNC: 20 NG/L

## 2025-02-19 PROCEDURE — 84484 ASSAY OF TROPONIN QUANT: CPT | Performed by: FAMILY MEDICINE

## 2025-02-19 PROCEDURE — 36415 COLL VENOUS BLD VENIPUNCTURE: CPT | Performed by: FAMILY MEDICINE

## 2025-02-19 ASSESSMENT — ACTIVITIES OF DAILY LIVING (ADL)
ADLS_ACUITY_SCORE: 54
ADLS_ACUITY_SCORE: 54

## 2025-02-19 NOTE — ED NOTES
EMERGENCY DEPARTMENT SIGN OUT NOTE        ED COURSE AND MEDICAL DECISION MAKING  Patient was signed out to me by Dr Luis A Alfaro at 10:00 PM    In brief, George R Milligan is a 89 year old male who initially presented for altered mental status. Evidence of UTI. See note from Dr. Alfaro for detailed HPI.    After dinner (ate normally) was staring off (normally awake alert chatty and disoriented). Then patient went back to normal after EMS arrival. Baseline Aox1. No focal deficits. CT negative. Pending trop and UA. If reassuring, can be discharged back to facility.    At time of sign out, disposition was pending troponin at 10:30 PM and UA.    UA shows microscopic hematuria, no evidence of infection.    Troponin not significantly changing, at 20. Will discharge at this time.    FINAL IMPRESSION    1. Spell of altered consciousness    2. Essential hypertension    3. Microscopic hematuria        ED MEDS  Medications - No data to display    LAB  Labs Ordered and Resulted from Time of ED Arrival to Time of ED Departure   BASIC METABOLIC PANEL - Abnormal       Result Value    Sodium 139      Potassium 5.4 (*)     Chloride 103      Carbon Dioxide (CO2) 26      Anion Gap 10      Urea Nitrogen 24.9 (*)     Creatinine 1.05      GFR Estimate 68      Calcium 9.3      Glucose 117 (*)    BLOOD GAS VENOUS - Abnormal    pH Venous 7.41      pCO2 Venous 47      pO2 Venous 34      Bicarbonate Venous 30 (*)     Base Excess/Deficit Venous 4.2 (*)     FIO2 21      Oxyhemoglobin Venous 64 (*)     O2 Sat, Venous 64.9 (*)    ROUTINE UA WITH MICROSCOPIC REFLEX TO CULTURE - Abnormal    Color Urine Colorless      Appearance Urine Clear      Glucose Urine Negative      Bilirubin Urine Negative      Ketones Urine Negative      Specific Gravity Urine 1.015      Blood Urine 0.03 mg/dL (*)     pH Urine 6.5      Protein Albumin Urine Negative      Urobilinogen Urine <2.0      Nitrite Urine Negative      Leukocyte Esterase Urine Negative       Bacteria Urine Few (*)     RBC Urine 6 (*)     WBC Urine 1     CBC WITH PLATELETS AND DIFFERENTIAL - Abnormal    WBC Count 10.4      RBC Count 3.96 (*)     Hemoglobin 12.2 (*)     Hematocrit 38.0 (*)     MCV 96      MCH 30.8      MCHC 32.1      RDW 12.2      Platelet Count 412      % Neutrophils 67      % Lymphocytes 18      % Monocytes 10      % Eosinophils 4      % Basophils 0      % Immature Granulocytes 1      NRBCs per 100 WBC 0      Absolute Neutrophils 7.0      Absolute Lymphocytes 1.8      Absolute Monocytes 1.0      Absolute Eosinophils 0.4      Absolute Basophils 0.0      Absolute Immature Granulocytes 0.1      Absolute NRBCs 0.0     TROPONIN T, HIGH SENSITIVITY - Normal    Troponin T, High Sensitivity 17     MAGNESIUM - Normal    Magnesium 2.1     NT PROBNP INPATIENT - Normal    N terminal Pro BNP Inpatient 399     INFLUENZA A/B, RSV AND SARS-COV2 PCR - Normal    Influenza A PCR Negative      Influenza B PCR Negative      RSV PCR Negative      SARS CoV2 PCR Negative     TROPONIN T, HIGH SENSITIVITY - Normal    Troponin T, High Sensitivity 20         EKG  None    RADIOLOGY    Head CT w/o contrast   Final Result   IMPRESSION:     1.  No evidence of acute intracranial hemorrhage or mass effect.   2.  Mild nonspecific white matter changes.   3.  Moderate brain parenchymal volume loss.          DISCHARGE MEDS  New Prescriptions    No medications on file     I, Nika Schwartz, am serving as a scribe to document services personally performed by Byron Reed MD, based on my observations and the provider's statements to me.  I, Byron Reed MD, attest that Nika Schwartz is acting in a scribe capacity, has observed my performance of the services and has documented them in accordance with my direction.    Byron Reed MD  [facility] EMERGENCY DEPARTMENT  [address] [place] [phone]  [phone]         Byron Reed MD  02/19/25 0042

## 2025-02-19 NOTE — PHARMACY-ADMISSION MEDICATION HISTORY
Pharmacist Admission Medication History    Admission medication history is complete. The information provided in this note is only as accurate as the sources available at the time of the update.    Information Source(s): Caregiver and Facility (TCU/NH/) medication list/MAR via phone    Pertinent Information: spoke with on-call RN for [facility] to obtain last doses.    Changes made to PTA medication list:  Added: trazodone  Deleted: desonide cream PRN, Mucinex PRN, ketoconazole shampoo  Changed: None    Allergies reviewed with patient and updates made in EHR: yes    Medication History Completed By: Nicollette McMann, [facility] 2/18/2025 10:46 PM    PTA Med List   Medication Sig Last Dose/Taking    acetaminophen (TYLENOL) 500 MG tablet Take 1,000 mg by mouth 3 times daily 2/18/2025 Evening    aspirin 81 mg chewable tablet [ASPIRIN 81 MG CHEWABLE TABLET] Chew 81 mg daily. 2/18/2025 Noon    cholecalciferol 50 MCG (2000 UT) tablet Take 2,000 Units by mouth daily 2/18/2025 Noon    loperamide (IMODIUM) 2 MG capsule Take 2 mg by mouth 3 times daily as needed for diarrhea 1/13/2025    losartan (COZAAR) 25 MG tablet Take 25 mg by mouth daily 2/18/2025 Noon    potassium chloride ER (KLOR-CON M) 10 MEQ CR tablet Take 10 mEq by mouth daily 2/18/2025 Noon    rosuvastatin (CRESTOR) 5 MG tablet Take 1 tablet (5 mg) by mouth daily 2/18/2025 Evening    traZODone (DESYREL) 50 MG tablet Take 50 mg by mouth at bedtime. 2/18/2025 Evening

## 2025-02-19 NOTE — ED PROVIDER NOTES
EMERGENCY DEPARTMENT ENCOUNTER      NAME: George R Milligan  AGE: 89 year old male  YOB: 1935  MRN: [id number]  EVALUATION DATE & TIME: 2/18/2025  7:26 PM    PCP: Blu Chen    ED PROVIDER: Luis A Alfaro M.D.    Chief Complaint   Patient presents with    Altered Mental Status       FINAL IMPRESSION:  No diagnosis found.    ED COURSE & MEDICAL DECISION MAKING:    Pertinent Labs & Imaging studies independently interpreted by me. (See chart for details)  Reviewed most recent emergency department visit from March 2024 when patient was seen for unwitnessed fall at [facility], no injuries noted, patient discharged    ED Course as of 02/18/25 2045 Tue Feb 18, 2025 1943 Patient seen and examined, presents today with an episode of altered mentation.  Per EMS, nursing home staff reports the patient was his usual self until after dinner when he had an episode where he was not responding although was awake and alert.  On EMS arrival, they say patient would look at them but not answer questions until they were on the way out the door when patient suddenly returned to his baseline mental status.  On exam here, patient is vitally stable, awake and alert, oriented to person, follows commands, no focal weakness.  Patient says he has bilateral leg pain but no other symptoms, does have bilateral lower extremity edema.  Labs are ordered with head CT, chest x-ray.  Patient is comfort care.   2029 EKG:    Independently reviewed and interpreted by me  Performed at: 8:21 PM  Impression: Nonspecific T wave changes  Rate: 82  Rhythm: Sinus  Axis: Normal  HI Interval: 216  QRS Interval: 86  QTc Interval: 448  ST Changes: No acute ischemic changes  Comparison: May 2024, no change         At the conclusion of the encounter I discussed the results of all of the tests and the disposition. The questions were answered. The patient or family acknowledged understanding and was agreeable with the care plan.     Medical  Decision Making  Obtained supplemental history:Supplemental history obtained?: Documented in chart  Reviewed external records: External records reviewed?: No  Care impacted by chronic illness:Documented in Chart  {Did you consider but not order tests?:516851}  {Did you interpret images independently?:133388}  Consultation discussion with other provider:{Did you involve another provider (consultant, , pharmacy, etc.)?:541316}  {ADMIT VS D/C:865912}    MIPS (CTPE, Dental pain, Joseph, Sinusitis, Asthma/COPD, Head Trauma): {ECC MIPS DOCUMENTATION:324311}    PROCEDURES:       MEDICATIONS GIVEN IN THE EMERGENCY:  Medications - No data to display    NEW PRESCRIPTIONS STARTED AT TODAY'S ER VISIT  New Prescriptions    No medications on file       =================================================================    HPI    Patient information was obtained from: EMS personnel and the patient    History and review of systems limited by dementia.       George R Milligan is a 89 year old male with a pertinent history of anemia, lumbar spondylosis, neuropathy, hypertension, hyperlipidemia, dementia, S/P CABG (2022), basal cell carcinoma who presents to this ED vias EMS for evaluation of altered mental status.     Per EMS personnel, the patient arrives from his memory care facility. He was taken to bed after eating dinner at 6:45 PM tonight (2/18/2025) when he suddenly became unresponsive although he was awake. The patient is talkative and interactive at baseline. Upon arrival of EMS, the patient was awake and looking out at them although he was still nonverbal. When they took him outside, he suddenly began to talk again. He is on strict comfort care treatment.     Per the patient, he endorses a cough, chest pain and new bilateral leg pain and swelling.     Per Chart Review, the patient was seen on 3/26/2024 at [facility] Emergency Department after taking a fall. The cause of the fall was unclear. He was given tylenol. EKG was with  sinus rhythm with first-degree AV block, unchanged from prior. CBC, BMP, magnesium and troponin were notable for chronic anemia. CT head and cervical spine was unremarkable. An x-ray of the left ankle was unremarkable. The patient was discharged home after ambulating.       REVIEW OF SYSTEMS   Review of Systems   All other systems reviewed and negative    PAST MEDICAL HISTORY:  No past medical history on file.    PAST SURGICAL HISTORY:  No past surgical history on file.    CURRENT MEDICATIONS:    No current facility-administered medications for this encounter.     Current Outpatient Medications   Medication Sig Dispense Refill    acetaminophen (TYLENOL) 500 MG tablet Take 1,000 mg by mouth 3 times daily      aspirin 81 mg chewable tablet [ASPIRIN 81 MG CHEWABLE TABLET] Chew 81 mg daily.      cholecalciferol 50 MCG (2000 UT) tablet Take 2,000 Units by mouth daily      desonide (DESOWEN) 0.05 % external cream Apply topically 2 times daily as needed (Rash/dry skin around eyebrows and behind ears)      guaiFENesin (MUCINEX) 600 MG 12 hr tablet Take 1,200 mg by mouth 2 times daily as needed for congestion      ketoconazole (NIZORAL) 2 % external shampoo Apply topically twice a week On Mondays and Fridays      loperamide (IMODIUM) 2 MG capsule Take 2 mg by mouth 3 times daily as needed for diarrhea      losartan (COZAAR) 25 MG tablet Take 25 mg by mouth daily      potassium chloride ER (KLOR-CON M) 10 MEQ CR tablet Take 10 mEq by mouth daily      rosuvastatin (CRESTOR) 5 MG tablet Take 1 tablet (5 mg) by mouth daily         ALLERGIES:  Allergies   Allergen Reactions    Atorvastatin Muscle Pain (Myalgia)    Fibrates Muscle Pain (Myalgia)     myalgias    Lopid [Gemfibrozil] Muscle Pain (Myalgia)    Pravachol [Pravastatin] Unknown    Statins Muscle Pain (Myalgia)       FAMILY HISTORY:  No family history on file.    SOCIAL HISTORY:   Social History     Socioeconomic History    Marital status: Single       VITALS:  BP (!)  "159/74   Pulse 90   Temp 97.9  F (36.6  C) (Oral)   Resp 19   Ht 1.702 m (5' 7\")   Wt 83.9 kg (185 lb)   SpO2 96%   BMI 28.98 kg/m      PHYSICAL EXAM:  Physical Exam  Vitals and nursing note reviewed.   Constitutional:       Appearance: Normal appearance.      Comments: Slow to answer questions.   HENT:      Head: Normocephalic and atraumatic.      Right Ear: External ear normal.      Left Ear: External ear normal.      Nose: Nose normal.      Mouth/Throat:      Mouth: Mucous membranes are moist.   Eyes:      Extraocular Movements: Extraocular movements intact.      Conjunctiva/sclera: Conjunctivae normal.      Pupils: Pupils are equal, round, and reactive to light.   Cardiovascular:      Rate and Rhythm: Normal rate and regular rhythm.   Pulmonary:      Effort: Pulmonary effort is normal.      Breath sounds: Normal breath sounds. No wheezing or rales.   Abdominal:      General: Abdomen is flat. There is no distension.      Palpations: Abdomen is soft.      Tenderness: There is no abdominal tenderness. There is no guarding.   Musculoskeletal:         General: Normal range of motion.      Cervical back: Normal range of motion and neck supple.      Comments: Bilateral pitting edema to the mid shin.   Lymphadenopathy:      Cervical: No cervical adenopathy.   Skin:     General: Skin is warm and dry.   Neurological:      General: No focal deficit present.      Mental Status: He is alert. Mental status is at baseline.      Comments: No gross focal neurologic deficits. Oriented to person.   Psychiatric:         Mood and Affect: Mood normal.         Behavior: Behavior normal.         Thought Content: Thought content normal.          LAB:  All pertinent labs reviewed and interpreted.  Results for orders placed or performed during the hospital encounter of 02/18/25   Blood gas venous   Result Value Ref Range    pH Venous 7.41 7.32 - 7.43    pCO2 Venous 47 40 - 50 mm Hg    pO2 Venous 34 25 - 47 mm Hg    Bicarbonate " Venous 30 (H) 21 - 28 mmol/L    Base Excess/Deficit Venous 4.2 (H) -3.0 - 3.0 mmol/L    FIO2 21     Oxyhemoglobin Venous 64 (L) 70 - 75 %    O2 Sat, Venous 64.9 (L) 70.0 - 75.0 %   CBC with platelets and differential   Result Value Ref Range    WBC Count 10.4 4.0 - 11.0 10e3/uL    RBC Count 3.96 (L) 4.40 - 5.90 10e6/uL    Hemoglobin 12.2 (L) 13.3 - 17.7 g/dL    Hematocrit 38.0 (L) 40.0 - 53.0 %    MCV 96 78 - 100 fL    MCH 30.8 26.5 - 33.0 pg    MCHC 32.1 31.5 - 36.5 g/dL    RDW 12.2 10.0 - 15.0 %    Platelet Count 412 150 - 450 10e3/uL    % Neutrophils 67 %    % Lymphocytes 18 %    % Monocytes 10 %    % Eosinophils 4 %    % Basophils 0 %    % Immature Granulocytes 1 %    NRBCs per 100 WBC 0 <1 /100    Absolute Neutrophils 7.0 1.6 - 8.3 10e3/uL    Absolute Lymphocytes 1.8 0.8 - 5.3 10e3/uL    Absolute Monocytes 1.0 0.0 - 1.3 10e3/uL    Absolute Eosinophils 0.4 0.0 - 0.7 10e3/uL    Absolute Basophils 0.0 0.0 - 0.2 10e3/uL    Absolute Immature Granulocytes 0.1 <=0.4 10e3/uL    Absolute NRBCs 0.0 10e3/uL       RADIOLOGY:  Reviewed all pertinent imaging. Please see official radiology report.  Head CT w/o contrast    (Results Pending)       I, Ebony Snell, am serving as a scribe to document services personally performed by Dr. Alfaro based on my observation and the provider's statements to me. I, Luis A Alfaro MD attest that Ebony Snell is acting in a scribe capacity, has observed my performance of the services and has documented them in accordance with my direction.    Luis A Alfaro M.D.  Emergency Medicine  [facility] EMERGENCY DEPARTMENT  [address] [place] [phone]  Dept: [phone]     Negative mg/dL    Specific Gravity Urine 1.015 1.001 - 1.030    Blood Urine 0.03 mg/dL (A) Negative    pH Urine 6.5 5.0 - 7.0    Protein Albumin Urine Negative Negative mg/dL    Urobilinogen Urine <2.0 <2.0 mg/dL    Nitrite Urine Negative Negative    Leukocyte Esterase Urine Negative Negative    Bacteria Urine Few (A) None Seen /HPF    RBC Urine 6 (H) <=2 /HPF    WBC Urine 1 <=5 /HPF   Influenza A/B, RSV and SARS-CoV2 PCR (COVID-19) Nasopharyngeal    Specimen: Nasopharyngeal; Swab   Result Value Ref Range    Influenza A PCR Negative Negative    Influenza B PCR Negative Negative    RSV PCR Negative Negative    SARS CoV2 PCR Negative Negative   CBC with platelets and differential   Result Value Ref Range    WBC Count 10.4 4.0 - 11.0 10e3/uL    RBC Count 3.96 (L) 4.40 - 5.90 10e6/uL    Hemoglobin 12.2 (L) 13.3 - 17.7 g/dL    Hematocrit 38.0 (L) 40.0 - 53.0 %    MCV 96 78 - 100 fL    MCH 30.8 26.5 - 33.0 pg    MCHC 32.1 31.5 - 36.5 g/dL    RDW 12.2 10.0 - 15.0 %    Platelet Count 412 150 - 450 10e3/uL    % Neutrophils 67 %    % Lymphocytes 18 %    % Monocytes 10 %    % Eosinophils 4 %    % Basophils 0 %    % Immature Granulocytes 1 %    NRBCs per 100 WBC 0 <1 /100    Absolute Neutrophils 7.0 1.6 - 8.3 10e3/uL    Absolute Lymphocytes 1.8 0.8 - 5.3 10e3/uL    Absolute Monocytes 1.0 0.0 - 1.3 10e3/uL    Absolute Eosinophils 0.4 0.0 - 0.7 10e3/uL    Absolute Basophils 0.0 0.0 - 0.2 10e3/uL    Absolute Immature Granulocytes 0.1 <=0.4 10e3/uL    Absolute NRBCs 0.0 10e3/uL   Result Value Ref Range    Troponin T, High Sensitivity 20 <=22 ng/L   ECG 12-LEAD WITH MUSE (LHE)   Result Value Ref Range    Systolic Blood Pressure 184 mmHg    Diastolic Blood Pressure 79 mmHg    Ventricular Rate 82 BPM    Atrial Rate 82 BPM    MS Interval 216 ms    QRS Duration 86 ms     ms    QTc 448 ms    P Axis 58 degrees    R AXIS -1 degrees    T Axis 5 degrees    Interpretation ECG       Sinus rhythm with sinus arrhythmia with 1st degree A-V  block  Minimal voltage criteria for LVH, may be normal variant  Borderline ECG  When compared with ECG of 26-Mar-2024 08:25,  Nonspecific T wave abnormality, improved in Inferior leads  Confirmed by SEE ED PROVIDER NOTE FOR, ECG INTERPRETATION ([id number]),  Marie Salazar ([id number]) on 2/20/2025 9:40:50 AM         RADIOLOGY:  Reviewed all pertinent imaging. Please see official radiology report.  Head CT w/o contrast   Final Result   IMPRESSION:     1.  No evidence of acute intracranial hemorrhage or mass effect.   2.  Mild nonspecific white matter changes.   3.  Moderate brain parenchymal volume loss.          I, Ebony Snell, am serving as a scribe to document services personally performed by Dr. Alfaro based on my observation and the provider's statements to me. I, Luis A Alfaro MD attest that Ebony Snell is acting in a scribe capacity, has observed my performance of the services and has documented them in accordance with my direction.    Luis A Alfaro M.D.  Emergency Medicine  [facility] EMERGENCY DEPARTMENT  [address] [place] [phone]  [phone]  Dept: [phone]       Luis A Alfaro MD  02/21/25 [id number]

## 2025-02-19 NOTE — ED NOTES
Bed: [facility]-  Expected date:   Expected time:   Means of arrival:   Comments:  Vertical 3/4/5

## 2025-02-19 NOTE — ED TRIAGE NOTES
Arrives from [facility] after episode of decreased responsiveness after dinner. On EMS arrival, patient was not responding verbally until taken outside into cold air and then became responsive. On arrival SHANNON to person only. Able to answer questions. Speech slurred at times but unsure if baseline.     Triage Assessment (Adult)       Row Name 02/18/25 1935          Triage Assessment    Airway WDL WDL        Respiratory WDL    Respiratory WDL WDL        Skin Circulation/Temperature WDL    Skin Circulation/Temperature WDL WDL        Cardiac WDL    Cardiac WDL WDL        Peripheral/Neurovascular WDL    Peripheral Neurovascular WDL WDL

## 2025-02-20 LAB
ATRIAL RATE - MUSE: 82 BPM
DIASTOLIC BLOOD PRESSURE - MUSE: 79 MMHG
INTERPRETATION ECG - MUSE: NORMAL
P AXIS - MUSE: 58 DEGREES
PR INTERVAL - MUSE: 216 MS
QRS DURATION - MUSE: 86 MS
QT - MUSE: 384 MS
QTC - MUSE: 448 MS
R AXIS - MUSE: -1 DEGREES
SYSTOLIC BLOOD PRESSURE - MUSE: 184 MMHG
T AXIS - MUSE: 5 DEGREES
VENTRICULAR RATE- MUSE: 82 BPM